# Patient Record
Sex: MALE | Race: WHITE | NOT HISPANIC OR LATINO | Employment: OTHER | URBAN - METROPOLITAN AREA
[De-identification: names, ages, dates, MRNs, and addresses within clinical notes are randomized per-mention and may not be internally consistent; named-entity substitution may affect disease eponyms.]

---

## 2023-06-04 ENCOUNTER — HOSPITAL ENCOUNTER (INPATIENT)
Facility: HOSPITAL | Age: 68
LOS: 4 days | Discharge: HOME | DRG: 208 | End: 2023-06-08
Attending: EMERGENCY MEDICINE | Admitting: INTERNAL MEDICINE
Payer: COMMERCIAL

## 2023-06-04 DIAGNOSIS — R06.02 SHORTNESS OF BREATH: ICD-10-CM

## 2023-06-04 DIAGNOSIS — R09.02 HYPOXIA: Primary | ICD-10-CM

## 2023-06-04 DIAGNOSIS — R07.9 CHEST PAIN: ICD-10-CM

## 2023-06-04 DIAGNOSIS — R06.00 DYSPNEA, UNSPECIFIED TYPE: ICD-10-CM

## 2023-06-04 DIAGNOSIS — J44.1 COPD EXACERBATION: ICD-10-CM

## 2023-06-04 DIAGNOSIS — R78.81 BACTEREMIA: ICD-10-CM

## 2023-06-04 PROBLEM — J44.9 COPD (CHRONIC OBSTRUCTIVE PULMONARY DISEASE): Status: ACTIVE | Noted: 2023-06-04

## 2023-06-04 PROBLEM — Z87.891 EX-SMOKER: Status: ACTIVE | Noted: 2023-06-04

## 2023-06-04 PROBLEM — C32.9 LARYNGEAL CANCER: Status: ACTIVE | Noted: 2023-06-04

## 2023-06-04 PROBLEM — J96.02 ACUTE HYPERCAPNIC RESPIRATORY FAILURE: Status: ACTIVE | Noted: 2023-06-04

## 2023-06-04 PROBLEM — K94.23 PEG TUBE MALFUNCTION: Status: ACTIVE | Noted: 2023-06-04

## 2023-06-04 PROBLEM — Z99.81 ON HOME OXYGEN THERAPY: Status: ACTIVE | Noted: 2023-06-04

## 2023-06-04 PROBLEM — J47.9 BRONCHIECTASIS: Status: ACTIVE | Noted: 2023-06-04

## 2023-06-04 LAB
ALBUMIN SERPL BCP-MCNC: 3.3 G/DL (ref 3.5–5.2)
ALLENS TEST: ABNORMAL
ALP SERPL-CCNC: 85 U/L (ref 55–135)
ALT SERPL W/O P-5'-P-CCNC: 46 U/L (ref 10–44)
ANION GAP SERPL CALC-SCNC: 11 MMOL/L (ref 8–16)
AST SERPL-CCNC: 25 U/L (ref 10–40)
BASOPHILS # BLD AUTO: 0.11 K/UL (ref 0–0.2)
BASOPHILS NFR BLD: 0.8 % (ref 0–1.9)
BILIRUB SERPL-MCNC: 0.4 MG/DL (ref 0.1–1)
BNP SERPL-MCNC: 52 PG/ML (ref 0–99)
BUN SERPL-MCNC: 24 MG/DL (ref 8–23)
CALCIUM SERPL-MCNC: 9.2 MG/DL (ref 8.7–10.5)
CHLORIDE SERPL-SCNC: 98 MMOL/L (ref 95–110)
CO2 SERPL-SCNC: 36 MMOL/L (ref 23–29)
CREAT SERPL-MCNC: 0.7 MG/DL (ref 0.5–1.4)
DELSYS: ABNORMAL
DIFFERENTIAL METHOD: ABNORMAL
EOSINOPHIL # BLD AUTO: 0.4 K/UL (ref 0–0.5)
EOSINOPHIL NFR BLD: 3 % (ref 0–8)
EP: 6
EP: 6
ERYTHROCYTE [DISTWIDTH] IN BLOOD BY AUTOMATED COUNT: 15.2 % (ref 11.5–14.5)
ERYTHROCYTE [SEDIMENTATION RATE] IN BLOOD BY WESTERGREN METHOD: 20 MM/H
ERYTHROCYTE [SEDIMENTATION RATE] IN BLOOD BY WESTERGREN METHOD: 26 MM/H
ERYTHROCYTE [SEDIMENTATION RATE] IN BLOOD BY WESTERGREN METHOD: 28 MM/H
EST. GFR  (NO RACE VARIABLE): >60 ML/MIN/1.73 M^2
FIO2: 35
FIO2: 40
FIO2: 40
FIO2: 50
FIO2: 50
GLUCOSE SERPL-MCNC: 132 MG/DL (ref 70–110)
GLUCOSE SERPL-MCNC: 137 MG/DL (ref 70–110)
GLUCOSE SERPL-MCNC: 153 MG/DL (ref 70–110)
GLUCOSE SERPL-MCNC: 162 MG/DL (ref 70–110)
GLUCOSE SERPL-MCNC: 221 MG/DL (ref 70–110)
GLUCOSE SERPL-MCNC: 261 MG/DL (ref 70–110)
HCO3 UR-SCNC: 30.7 MMOL/L (ref 24–28)
HCO3 UR-SCNC: 32.3 MMOL/L (ref 24–28)
HCO3 UR-SCNC: 33.6 MMOL/L (ref 24–28)
HCO3 UR-SCNC: 39.4 MMOL/L (ref 24–28)
HCO3 UR-SCNC: 39.8 MMOL/L (ref 24–28)
HCT VFR BLD AUTO: 45.9 % (ref 40–54)
HCT VFR BLD CALC: 35 %PCV (ref 36–54)
HCT VFR BLD CALC: 36 %PCV (ref 36–54)
HCT VFR BLD CALC: 38 %PCV (ref 36–54)
HCT VFR BLD CALC: 41 %PCV (ref 36–54)
HGB BLD-MCNC: 12.9 G/DL (ref 14–18)
IMM GRANULOCYTES # BLD AUTO: 0.07 K/UL (ref 0–0.04)
IMM GRANULOCYTES NFR BLD AUTO: 0.5 % (ref 0–0.5)
IP: 12
IP: 18
LACTATE SERPL-SCNC: 1.5 MMOL/L (ref 0.5–1.9)
LYMPHOCYTES # BLD AUTO: 0.5 K/UL (ref 1–4.8)
LYMPHOCYTES NFR BLD: 3.5 % (ref 18–48)
MAGNESIUM SERPL-MCNC: 2.4 MG/DL (ref 1.6–2.6)
MCH RBC QN AUTO: 26.8 PG (ref 27–31)
MCHC RBC AUTO-ENTMCNC: 28.1 G/DL (ref 32–36)
MCV RBC AUTO: 95 FL (ref 82–98)
MIN VOL: 1.5
MIN VOL: 10.8
MIN VOL: 7.64
MIN VOL: 8.6
MODE: ABNORMAL
MONOCYTES # BLD AUTO: 0.4 K/UL (ref 0.3–1)
MONOCYTES NFR BLD: 3.1 % (ref 4–15)
NEUTROPHILS # BLD AUTO: 12 K/UL (ref 1.8–7.7)
NEUTROPHILS NFR BLD: 89.1 % (ref 38–73)
NRBC BLD-RTO: 0 /100 WBC
PCO2 BLDA: 109.7 MMHG (ref 35–45)
PCO2 BLDA: 51.7 MMHG (ref 35–45)
PCO2 BLDA: 62.2 MMHG (ref 35–45)
PCO2 BLDA: 83.2 MMHG (ref 35–45)
PCO2 BLDA: 83.2 MMHG (ref 35–45)
PEEP: 5
PH SMN: 7.16 [PH] (ref 7.35–7.45)
PH SMN: 7.2 [PH] (ref 7.35–7.45)
PH SMN: 7.29 [PH] (ref 7.35–7.45)
PH SMN: 7.3 [PH] (ref 7.35–7.45)
PH SMN: 7.42 [PH] (ref 7.35–7.45)
PIP: 29
PIP: 35
PLATELET # BLD AUTO: 642 K/UL (ref 150–450)
PMV BLD AUTO: 10.5 FL (ref 9.2–12.9)
PO2 BLDA: 68 MMHG (ref 80–100)
PO2 BLDA: 84 MMHG (ref 80–100)
PO2 BLDA: 84 MMHG (ref 80–100)
PO2 BLDA: 95 MMHG (ref 80–100)
PO2 BLDA: 98 MMHG (ref 80–100)
POC BE: 11 MMOL/L
POC BE: 13 MMOL/L
POC BE: 4 MMOL/L
POC BE: 4 MMOL/L
POC BE: 9 MMOL/L
POC IONIZED CALCIUM: 1.15 MMOL/L (ref 1.06–1.42)
POC IONIZED CALCIUM: 1.17 MMOL/L (ref 1.06–1.42)
POC IONIZED CALCIUM: 1.17 MMOL/L (ref 1.06–1.42)
POC IONIZED CALCIUM: 1.26 MMOL/L (ref 1.06–1.42)
POC SATURATED O2: 90 % (ref 95–100)
POC SATURATED O2: 94 % (ref 95–100)
POC SATURATED O2: 95 % (ref 95–100)
POC SATURATED O2: 95 % (ref 95–100)
POC SATURATED O2: 96 % (ref 95–100)
POC TCO2: 33 MMOL/L (ref 23–27)
POC TCO2: 35 MMOL/L (ref 23–27)
POC TCO2: 35 MMOL/L (ref 23–27)
POC TCO2: 42 MMOL/L (ref 23–27)
POC TCO2: 43 MMOL/L (ref 23–27)
POTASSIUM BLD-SCNC: 4.4 MMOL/L (ref 3.5–5.1)
POTASSIUM BLD-SCNC: 4.5 MMOL/L (ref 3.5–5.1)
POTASSIUM BLD-SCNC: 4.7 MMOL/L (ref 3.5–5.1)
POTASSIUM BLD-SCNC: 5.3 MMOL/L (ref 3.5–5.1)
POTASSIUM SERPL-SCNC: 4.6 MMOL/L (ref 3.5–5.1)
PROT SERPL-MCNC: 8.2 G/DL (ref 6–8.4)
RBC # BLD AUTO: 4.81 M/UL (ref 4.6–6.2)
SAMPLE: ABNORMAL
SITE: ABNORMAL
SODIUM BLD-SCNC: 144 MMOL/L (ref 136–145)
SODIUM BLD-SCNC: 144 MMOL/L (ref 136–145)
SODIUM BLD-SCNC: 145 MMOL/L (ref 136–145)
SODIUM BLD-SCNC: 147 MMOL/L (ref 136–145)
SODIUM SERPL-SCNC: 145 MMOL/L (ref 136–145)
SP02: 100
SP02: 97
SP02: 98
SP02: 99
SPONT RATE: 28
SPONT RATE: 29
TROPONIN I SERPL HS-MCNC: 9.5 PG/ML (ref 0–14.9)
VT: 370
WBC # BLD AUTO: 13.49 K/UL (ref 3.9–12.7)

## 2023-06-04 PROCEDURE — 25000003 PHARM REV CODE 250: Performed by: INTERNAL MEDICINE

## 2023-06-04 PROCEDURE — 36620 INSERTION CATHETER ARTERY: CPT

## 2023-06-04 PROCEDURE — 25000242 PHARM REV CODE 250 ALT 637 W/ HCPCS: Performed by: EMERGENCY MEDICINE

## 2023-06-04 PROCEDURE — 99900026 HC AIRWAY MAINTENANCE (STAT)

## 2023-06-04 PROCEDURE — 87077 CULTURE AEROBIC IDENTIFY: CPT | Performed by: EMERGENCY MEDICINE

## 2023-06-04 PROCEDURE — 99291 CRITICAL CARE FIRST HOUR: CPT | Mod: ,,, | Performed by: INTERNAL MEDICINE

## 2023-06-04 PROCEDURE — 93005 ELECTROCARDIOGRAM TRACING: CPT | Performed by: INTERNAL MEDICINE

## 2023-06-04 PROCEDURE — 80053 COMPREHEN METABOLIC PANEL: CPT | Performed by: EMERGENCY MEDICINE

## 2023-06-04 PROCEDURE — 25000003 PHARM REV CODE 250: Performed by: EMERGENCY MEDICINE

## 2023-06-04 PROCEDURE — 25000242 PHARM REV CODE 250 ALT 637 W/ HCPCS: Performed by: INTERNAL MEDICINE

## 2023-06-04 PROCEDURE — 87186 SC STD MICRODIL/AGAR DIL: CPT | Performed by: EMERGENCY MEDICINE

## 2023-06-04 PROCEDURE — 51702 INSERT TEMP BLADDER CATH: CPT

## 2023-06-04 PROCEDURE — 99900031 HC PATIENT EDUCATION (STAT)

## 2023-06-04 PROCEDURE — 87070 CULTURE OTHR SPECIMN AEROBIC: CPT | Performed by: EMERGENCY MEDICINE

## 2023-06-04 PROCEDURE — 82330 ASSAY OF CALCIUM: CPT

## 2023-06-04 PROCEDURE — 83735 ASSAY OF MAGNESIUM: CPT | Performed by: EMERGENCY MEDICINE

## 2023-06-04 PROCEDURE — 63600175 PHARM REV CODE 636 W HCPCS: Performed by: EMERGENCY MEDICINE

## 2023-06-04 PROCEDURE — 87040 BLOOD CULTURE FOR BACTERIA: CPT | Mod: 59 | Performed by: EMERGENCY MEDICINE

## 2023-06-04 PROCEDURE — 94002 VENT MGMT INPAT INIT DAY: CPT

## 2023-06-04 PROCEDURE — 63600175 PHARM REV CODE 636 W HCPCS: Performed by: INTERNAL MEDICINE

## 2023-06-04 PROCEDURE — 82803 BLOOD GASES ANY COMBINATION: CPT

## 2023-06-04 PROCEDURE — 94660 CPAP INITIATION&MGMT: CPT

## 2023-06-04 PROCEDURE — 84484 ASSAY OF TROPONIN QUANT: CPT | Performed by: EMERGENCY MEDICINE

## 2023-06-04 PROCEDURE — 85014 HEMATOCRIT: CPT

## 2023-06-04 PROCEDURE — 84132 ASSAY OF SERUM POTASSIUM: CPT

## 2023-06-04 PROCEDURE — 83605 ASSAY OF LACTIC ACID: CPT | Performed by: EMERGENCY MEDICINE

## 2023-06-04 PROCEDURE — 99900035 HC TECH TIME PER 15 MIN (STAT)

## 2023-06-04 PROCEDURE — 37799 UNLISTED PX VASCULAR SURGERY: CPT

## 2023-06-04 PROCEDURE — 83880 ASSAY OF NATRIURETIC PEPTIDE: CPT | Performed by: EMERGENCY MEDICINE

## 2023-06-04 PROCEDURE — 25500020 PHARM REV CODE 255: Performed by: EMERGENCY MEDICINE

## 2023-06-04 PROCEDURE — 94761 N-INVAS EAR/PLS OXIMETRY MLT: CPT

## 2023-06-04 PROCEDURE — 99291 CRITICAL CARE FIRST HOUR: CPT

## 2023-06-04 PROCEDURE — 93010 EKG 12-LEAD: ICD-10-PCS | Mod: ,,, | Performed by: INTERNAL MEDICINE

## 2023-06-04 PROCEDURE — 94640 AIRWAY INHALATION TREATMENT: CPT

## 2023-06-04 PROCEDURE — 94799 UNLISTED PULMONARY SVC/PX: CPT

## 2023-06-04 PROCEDURE — 93010 ELECTROCARDIOGRAM REPORT: CPT | Mod: ,,, | Performed by: INTERNAL MEDICINE

## 2023-06-04 PROCEDURE — 87154 CUL TYP ID BLD PTHGN 6+ TRGT: CPT | Performed by: EMERGENCY MEDICINE

## 2023-06-04 PROCEDURE — 20000000 HC ICU ROOM

## 2023-06-04 PROCEDURE — 36600 WITHDRAWAL OF ARTERIAL BLOOD: CPT

## 2023-06-04 PROCEDURE — 84295 ASSAY OF SERUM SODIUM: CPT

## 2023-06-04 PROCEDURE — 87205 SMEAR GRAM STAIN: CPT | Performed by: EMERGENCY MEDICINE

## 2023-06-04 PROCEDURE — 82962 GLUCOSE BLOOD TEST: CPT

## 2023-06-04 PROCEDURE — 99291 PR CRITICAL CARE, E/M 30-74 MINUTES: ICD-10-PCS | Mod: ,,, | Performed by: INTERNAL MEDICINE

## 2023-06-04 PROCEDURE — 85025 COMPLETE CBC W/AUTO DIFF WBC: CPT | Performed by: EMERGENCY MEDICINE

## 2023-06-04 PROCEDURE — 63600175 PHARM REV CODE 636 W HCPCS

## 2023-06-04 PROCEDURE — 27000221 HC OXYGEN, UP TO 24 HOURS

## 2023-06-04 PROCEDURE — 87147 CULTURE TYPE IMMUNOLOGIC: CPT | Mod: 59 | Performed by: EMERGENCY MEDICINE

## 2023-06-04 RX ORDER — IPRATROPIUM BROMIDE AND ALBUTEROL SULFATE 2.5; .5 MG/3ML; MG/3ML
3 SOLUTION RESPIRATORY (INHALATION) ONCE
Status: COMPLETED | OUTPATIENT
Start: 2023-06-04 | End: 2023-06-04

## 2023-06-04 RX ORDER — FAMOTIDINE 20 MG/50ML
20 INJECTION, SOLUTION INTRAVENOUS 2 TIMES DAILY
Status: DISCONTINUED | OUTPATIENT
Start: 2023-06-04 | End: 2023-06-04

## 2023-06-04 RX ORDER — LIDOCAINE HYDROCHLORIDE 10 MG/ML
5 INJECTION, SOLUTION EPIDURAL; INFILTRATION; INTRACAUDAL; PERINEURAL
Status: COMPLETED | OUTPATIENT
Start: 2023-06-04 | End: 2023-06-04

## 2023-06-04 RX ORDER — ENOXAPARIN SODIUM 100 MG/ML
40 INJECTION SUBCUTANEOUS EVERY 24 HOURS
Status: DISCONTINUED | OUTPATIENT
Start: 2023-06-04 | End: 2023-06-08 | Stop reason: HOSPADM

## 2023-06-04 RX ORDER — FAMOTIDINE 10 MG/ML
20 INJECTION INTRAVENOUS 2 TIMES DAILY
Status: DISCONTINUED | OUTPATIENT
Start: 2023-06-04 | End: 2023-06-05

## 2023-06-04 RX ORDER — AMLODIPINE BESYLATE 5 MG/1
5 TABLET ORAL DAILY
COMMUNITY
Start: 2023-05-26

## 2023-06-04 RX ORDER — GLUCAGON 1 MG
1 KIT INJECTION
Status: DISCONTINUED | OUTPATIENT
Start: 2023-06-04 | End: 2023-06-08 | Stop reason: HOSPADM

## 2023-06-04 RX ORDER — ALBUTEROL SULFATE 0.83 MG/ML
2.5 SOLUTION RESPIRATORY (INHALATION) 2 TIMES DAILY
COMMUNITY
Start: 2023-05-19

## 2023-06-04 RX ORDER — HYDROMORPHONE HYDROCHLORIDE 1 MG/ML
1 INJECTION, SOLUTION INTRAMUSCULAR; INTRAVENOUS; SUBCUTANEOUS
Status: COMPLETED | OUTPATIENT
Start: 2023-06-04 | End: 2023-06-04

## 2023-06-04 RX ORDER — ROFLUMILAST 500 UG/1
500 TABLET ORAL DAILY
COMMUNITY
Start: 2023-05-17

## 2023-06-04 RX ORDER — IBUPROFEN 200 MG
16 TABLET ORAL
Status: DISCONTINUED | OUTPATIENT
Start: 2023-06-04 | End: 2023-06-08 | Stop reason: HOSPADM

## 2023-06-04 RX ORDER — BUDESONIDE, GLYCOPYRROLATE, AND FORMOTEROL FUMARATE 160; 9; 4.8 UG/1; UG/1; UG/1
2 AEROSOL, METERED RESPIRATORY (INHALATION) 2 TIMES DAILY
COMMUNITY
Start: 2023-05-21

## 2023-06-04 RX ORDER — FAMOTIDINE 20 MG/1
20 TABLET, FILM COATED ORAL 2 TIMES DAILY
COMMUNITY
Start: 2023-03-26

## 2023-06-04 RX ORDER — FENTANYL CITRATE-0.9 % NACL/PF 10 MCG/ML
0-200 PLASTIC BAG, INJECTION (ML) INTRAVENOUS CONTINUOUS
Status: DISCONTINUED | OUTPATIENT
Start: 2023-06-04 | End: 2023-06-05

## 2023-06-04 RX ORDER — ETOMIDATE 2 MG/ML
INJECTION INTRAVENOUS CODE/TRAUMA/SEDATION MEDICATION
Status: DISCONTINUED | OUTPATIENT
Start: 2023-06-04 | End: 2023-06-08 | Stop reason: HOSPADM

## 2023-06-04 RX ORDER — LEVOTHYROXINE SODIUM 125 UG/1
125 TABLET ORAL DAILY
COMMUNITY
Start: 2023-05-12

## 2023-06-04 RX ORDER — AMITRIPTYLINE HYDROCHLORIDE 50 MG/1
50 TABLET, FILM COATED ORAL NIGHTLY
COMMUNITY
Start: 2023-05-12

## 2023-06-04 RX ORDER — FLUTICASONE PROPIONATE 50 MCG
1 SPRAY, SUSPENSION (ML) NASAL DAILY
COMMUNITY
Start: 2023-04-13

## 2023-06-04 RX ORDER — SODIUM CHLORIDE 0.9 % (FLUSH) 0.9 %
10 SYRINGE (ML) INJECTION EVERY 12 HOURS PRN
Status: DISCONTINUED | OUTPATIENT
Start: 2023-06-04 | End: 2023-06-08 | Stop reason: HOSPADM

## 2023-06-04 RX ORDER — SUCCINYLCHOLINE CHLORIDE 20 MG/ML
120 INJECTION INTRAMUSCULAR; INTRAVENOUS
Status: COMPLETED | OUTPATIENT
Start: 2023-06-04 | End: 2023-06-04

## 2023-06-04 RX ORDER — TOBRAMYCIN INHALATION 300 MG/4ML
300 SOLUTION RESPIRATORY (INHALATION) 2 TIMES DAILY
COMMUNITY
Start: 2023-01-10

## 2023-06-04 RX ORDER — MIDAZOLAM HYDROCHLORIDE 1 MG/ML
INJECTION INTRAMUSCULAR; INTRAVENOUS
Status: COMPLETED
Start: 2023-06-04 | End: 2023-06-04

## 2023-06-04 RX ORDER — PROPOFOL 10 MG/ML
0-50 INJECTION, EMULSION INTRAVENOUS CONTINUOUS
Status: DISCONTINUED | OUTPATIENT
Start: 2023-06-04 | End: 2023-06-05

## 2023-06-04 RX ORDER — IPRATROPIUM BROMIDE AND ALBUTEROL SULFATE 2.5; .5 MG/3ML; MG/3ML
SOLUTION RESPIRATORY (INHALATION)
Status: DISPENSED
Start: 2023-06-04 | End: 2023-06-04

## 2023-06-04 RX ORDER — IPRATROPIUM BROMIDE AND ALBUTEROL SULFATE 2.5; .5 MG/3ML; MG/3ML
3 SOLUTION RESPIRATORY (INHALATION) EVERY 4 HOURS
Status: DISCONTINUED | OUTPATIENT
Start: 2023-06-04 | End: 2023-06-08 | Stop reason: HOSPADM

## 2023-06-04 RX ORDER — SODIUM CHLORIDE FOR INHALATION 3 %
4 VIAL, NEBULIZER (ML) INHALATION 2 TIMES DAILY
COMMUNITY
Start: 2022-11-08

## 2023-06-04 RX ORDER — FERROUS SULFATE TAB 325 MG (65 MG ELEMENTAL FE) 325 (65 FE) MG
325 TAB ORAL EVERY OTHER DAY
COMMUNITY
Start: 2023-05-17

## 2023-06-04 RX ORDER — NOREPINEPHRINE BITARTRATE/D5W 4MG/250ML
PLASTIC BAG, INJECTION (ML) INTRAVENOUS
Status: DISPENSED
Start: 2023-06-04 | End: 2023-06-04

## 2023-06-04 RX ORDER — IBUPROFEN 200 MG
24 TABLET ORAL
Status: DISCONTINUED | OUTPATIENT
Start: 2023-06-04 | End: 2023-06-08 | Stop reason: HOSPADM

## 2023-06-04 RX ORDER — CHLORHEXIDINE GLUCONATE ORAL RINSE 1.2 MG/ML
15 SOLUTION DENTAL 2 TIMES DAILY
Status: DISCONTINUED | OUTPATIENT
Start: 2023-06-04 | End: 2023-06-08 | Stop reason: HOSPADM

## 2023-06-04 RX ORDER — OXYCODONE AND ACETAMINOPHEN 10; 325 MG/1; MG/1
1 TABLET ORAL EVERY 8 HOURS PRN
COMMUNITY
Start: 2023-05-02

## 2023-06-04 RX ORDER — ALBUTEROL SULFATE 90 UG/1
1-2 AEROSOL, METERED RESPIRATORY (INHALATION) EVERY 6 HOURS PRN
COMMUNITY
Start: 2022-11-08

## 2023-06-04 RX ORDER — NOREPINEPHRINE BITARTRATE/D5W 4MG/250ML
0-3 PLASTIC BAG, INJECTION (ML) INTRAVENOUS CONTINUOUS
Status: DISCONTINUED | OUTPATIENT
Start: 2023-06-04 | End: 2023-06-05

## 2023-06-04 RX ORDER — PREDNISONE 20 MG/1
20 TABLET ORAL DAILY
Status: ON HOLD | COMMUNITY
Start: 2023-04-01 | End: 2023-06-08 | Stop reason: HOSPADM

## 2023-06-04 RX ORDER — NALOXONE HCL 0.4 MG/ML
0.02 VIAL (ML) INJECTION
Status: DISCONTINUED | OUTPATIENT
Start: 2023-06-04 | End: 2023-06-08 | Stop reason: HOSPADM

## 2023-06-04 RX ORDER — MIDAZOLAM HYDROCHLORIDE 1 MG/ML
5 INJECTION INTRAMUSCULAR; INTRAVENOUS
Status: COMPLETED | OUTPATIENT
Start: 2023-06-04 | End: 2023-06-04

## 2023-06-04 RX ADMIN — MIDAZOLAM HYDROCHLORIDE 5 MG: 1 INJECTION, SOLUTION INTRAMUSCULAR; INTRAVENOUS at 09:06

## 2023-06-04 RX ADMIN — PROPOFOL 5 MCG/KG/MIN: 10 INJECTION, EMULSION INTRAVENOUS at 09:06

## 2023-06-04 RX ADMIN — IPRATROPIUM BROMIDE AND ALBUTEROL SULFATE 3 ML: .5; 3 SOLUTION RESPIRATORY (INHALATION) at 09:06

## 2023-06-04 RX ADMIN — AZITHROMYCIN MONOHYDRATE 500 MG: 500 INJECTION, POWDER, LYOPHILIZED, FOR SOLUTION INTRAVENOUS at 07:06

## 2023-06-04 RX ADMIN — PROPOFOL 50 MCG/KG/MIN: 10 INJECTION, EMULSION INTRAVENOUS at 02:06

## 2023-06-04 RX ADMIN — NOREPINEPHRINE BITARTRATE 0.05 MCG/KG/MIN: 4 INJECTION, SOLUTION INTRAVENOUS at 09:06

## 2023-06-04 RX ADMIN — IPRATROPIUM BROMIDE AND ALBUTEROL SULFATE 3 ML: 2.5; .5 SOLUTION RESPIRATORY (INHALATION) at 07:06

## 2023-06-04 RX ADMIN — METHYLPREDNISOLONE SODIUM SUCCINATE 80 MG: 40 INJECTION, POWDER, FOR SOLUTION INTRAMUSCULAR; INTRAVENOUS at 09:06

## 2023-06-04 RX ADMIN — SODIUM CHLORIDE 1000 ML: 0.9 INJECTION, SOLUTION INTRAVENOUS at 07:06

## 2023-06-04 RX ADMIN — FAMOTIDINE 20 MG: 10 INJECTION, SOLUTION INTRAVENOUS at 09:06

## 2023-06-04 RX ADMIN — ENOXAPARIN SODIUM 40 MG: 100 INJECTION SUBCUTANEOUS at 05:06

## 2023-06-04 RX ADMIN — LIDOCAINE HYDROCHLORIDE 50 MG: 10 SOLUTION INTRAVENOUS at 09:06

## 2023-06-04 RX ADMIN — METHYLPREDNISOLONE SODIUM SUCCINATE 80 MG: 40 INJECTION, POWDER, FOR SOLUTION INTRAMUSCULAR; INTRAVENOUS at 02:06

## 2023-06-04 RX ADMIN — ETOMIDATE 10 MG: 2 INJECTION, SOLUTION INTRAVENOUS at 08:06

## 2023-06-04 RX ADMIN — Medication 25 MCG/HR: at 11:06

## 2023-06-04 RX ADMIN — IOHEXOL 100 ML: 350 INJECTION, SOLUTION INTRAVENOUS at 04:06

## 2023-06-04 RX ADMIN — PROPOFOL 50 MCG/KG/MIN: 10 INJECTION, EMULSION INTRAVENOUS at 08:06

## 2023-06-04 RX ADMIN — METHYLPREDNISOLONE SODIUM SUCCINATE 80 MG: 40 INJECTION, POWDER, FOR SOLUTION INTRAMUSCULAR; INTRAVENOUS at 05:06

## 2023-06-04 RX ADMIN — MIDAZOLAM HYDROCHLORIDE 5 MG: 1 INJECTION INTRAMUSCULAR; INTRAVENOUS at 09:06

## 2023-06-04 RX ADMIN — HYDROMORPHONE HYDROCHLORIDE 1 MG: 0.5 INJECTION, SOLUTION INTRAMUSCULAR; INTRAVENOUS; SUBCUTANEOUS at 05:06

## 2023-06-04 RX ADMIN — IPRATROPIUM BROMIDE AND ALBUTEROL SULFATE 3 ML: 2.5; .5 SOLUTION RESPIRATORY (INHALATION) at 11:06

## 2023-06-04 RX ADMIN — Medication 120 MG: at 08:06

## 2023-06-04 RX ADMIN — SODIUM CHLORIDE 1000 ML: 0.9 INJECTION, SOLUTION INTRAVENOUS at 05:06

## 2023-06-04 RX ADMIN — CHLORHEXIDINE GLUCONATE 15 ML: 1.2 RINSE ORAL at 09:06

## 2023-06-04 NOTE — PLAN OF CARE
[facility]  Initial Discharge Assessment       Primary Care Provider: [facility]    Admission Diagnosis: Shortness of breath [R06.02]  Acute respiratory failure [J96.00]    Admission Date: 6/4/2023  Expected Discharge Date:     Transition of Care Barriers: None    Payor: HEALTHY BLUE MEDICARE ADVANTAGE / Plan: HEALTHY BLUE DUAL ADVANTAGE / Product Type: Medicare Advantage /     Extended Emergency Contact Information  Primary Emergency Contact: Linh Moy  Address: [id number] [place] [address]  Home Phone: [phone]  Mobile Phone: [phone]  Relation: Spouse   needed? No    Discharge Plan A: Home with family  Discharge Plan B: Home with family      [facility] [id number] - [facility] [address]  [address] & [address] [facility]  [address] [address]  Phone: [phone] Fax: [phone]      Initial Assessment (most recent)       Adult Discharge Assessment - 06/04/23 1240          Discharge Assessment    Assessment Type Discharge Planning Assessment     Confirmed/corrected address, phone number and insurance Yes     Confirmed Demographics Correct on Facesheet     Source of Information family;health record     Reason For Admission SOB     People in Home significant other     Facility Arrived From: home     Do you expect to return to your current living situation? Yes     Do you have help at home or someone to help you manage your care at home? Yes     Who are your caregiver(s) and their phone number(s)? Linh Moy (Spouse)   [phone] (Mobile)     Prior to hospitilization cognitive status: Unable to Assess     Current cognitive status: Unable to Assess     Equipment Currently Used at Home BIPAP;oxygen;feeding device;nutrition supplies     Readmission within 30 days? No     Patient currently being followed by outpatient case management? No     Do you take prescription medications? Yes     Do  you have prescription coverage? Yes     Coverage HEALTHY BLUE MEDICARE ADVANTAGE - HEALTHY BLUE DUAL ADVANTAGE     Do you have any problems affording any of your prescribed medications? No     Is the patient taking medications as prescribed? yes     Who is going to help you get home at discharge? Linh Moy (Spouse)   [phone] (Mobile)     How do you get to doctors appointments? family or friend will provide     Are you on dialysis? No     Do you take coumadin? No     Discharge Plan A Home with family     Discharge Plan B Home with family     DME Needed Upon Discharge  none     Discharge Plan discussed with: Spouse/sig other     Name(s) and Number(s) Linh Moy (Spouse)   [phone] (Mobile)     Transition of Care Barriers None

## 2023-06-04 NOTE — SUBJECTIVE & OBJECTIVE
No past medical history on file.    No past surgical history on file.    Review of patient's allergies indicates:  No Known Allergies    No current facility-administered medications on file prior to encounter.     No current outpatient medications on file prior to encounter.     Family History    None       Tobacco Use    Smoking status: Former     Types: Cigarettes    Smokeless tobacco: Not on file   Substance and Sexual Activity    Alcohol use: Not Currently    Drug use: Not Currently    Sexual activity: Not Currently     Review of Systems   Constitutional:  Negative for activity change, appetite change and diaphoresis.   HENT:  Negative for congestion, facial swelling and sinus pressure.    Respiratory:  Positive for shortness of breath. Negative for wheezing.    Cardiovascular:  Negative for chest pain and palpitations.   Gastrointestinal:  Negative for abdominal distention and abdominal pain.   Genitourinary:  Negative for dysuria.   Skin:  Negative for color change and pallor.   Neurological:  Negative for dizziness, facial asymmetry, speech difficulty and headaches.   Psychiatric/Behavioral:  Negative for agitation and confusion.    Objective:     Vital Signs (Most Recent):  Temp: 99.6 °F (37.6 °C) (06/04/23 0228)  Pulse: 103 (06/04/23 0400)  Resp: (!) 28 (06/04/23 0400)  BP: (!) 96/52 (06/04/23 0400)  SpO2: 97 % (06/04/23 0432) Vital Signs (24h Range):  Temp:  [99.6 °F (37.6 °C)] 99.6 °F (37.6 °C)  Pulse:  [103-129] 103  Resp:  [22-31] 28  SpO2:  [68 %-100 %] 97 %  BP: ()/(52-90) 96/52     Weight: 59 kg (130 lb)  There is no height or weight on file to calculate BMI.     Physical Exam  Constitutional:       General: He is not in acute distress.     Appearance: He is well-developed.   HENT:      Head: Normocephalic.      Nose: Nose normal.   Eyes:      Pupils: Pupils are equal, round, and reactive to light.   Cardiovascular:      Rate and Rhythm: Normal rate and regular rhythm.   Pulmonary:      Effort:  Pulmonary effort is normal. No respiratory distress.      Comments: Decreased BS  Abdominal:      General: Abdomen is flat. There is no distension.      Tenderness: There is no abdominal tenderness.   Musculoskeletal:         General: Normal range of motion.      Cervical back: Neck supple.   Skin:     Findings: No rash.   Neurological:      Mental Status: He is alert and oriented to person, place, and time.   Psychiatric:         Mood and Affect: Mood normal.            CRANIAL NERVES     CN III, IV, VI   Pupils are equal, round, and reactive to light.     Significant Labs: All pertinent labs within the past 24 hours have been reviewed.  CBC:   Recent Labs   Lab 06/04/23 0226 06/04/23 0250   WBC 13.49*  --    HGB 12.9*  --    HCT 45.9 41   *  --      CMP:   Recent Labs   Lab 06/04/23 0226      K 4.6   CL 98   CO2 36*   *   BUN 24*   CREATININE 0.7   CALCIUM 9.2   PROT 8.2   ALBUMIN 3.3*   BILITOT 0.4   ALKPHOS 85   AST 25   ALT 46*   ANIONGAP 11     Cardiac Markers:   Recent Labs   Lab 06/04/23 0226   BNP 52       Significant Imaging: I have reviewed all pertinent imaging results/findings within the past 24 hours.

## 2023-06-04 NOTE — H&P
[facility] - Emergency Dept  Hospital Medicine  History & Physical    Patient Name: Reji Romano  MRN: [id number]  Patient Class: IP- Inpatient  Admission Date: 6/4/2023  Attending Physician: Constantine Alvarez MD   Primary Care Provider: [facility]         Patient information was obtained from patient and ER records.     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  He was diagnosed with tonsillar cancer 4 years ago and completed the treatment and following up with the Oncology and currently in remission.  He is ex-smoker about 4 years and stopped after he got diagnosis of cancer.  This evening he started having shortness of breath and wheezing and brought to the emergency room.  ABG was done in the emergency room with hypercapnia and started BiPAP.  Later CT chest was done no pulmonary embolism and no infiltrate and chronic changes.  There is no  record in this hospital and patient always follow-up with [facility].  On examination patient is on   BiPAP and history mostly from wife.  Patient needed PEG tube feeding.  Lung exam with limited air entry.  ABG with a respiratory acidosis and will continue BiPAP.      No past medical history on file.    No past surgical history on file.    Review of patient's allergies indicates:  No Known Allergies    No current facility-administered medications on file prior to encounter.     No current outpatient medications on file prior to encounter.     Family History    None       Tobacco Use    Smoking status: Former     Types: Cigarettes    Smokeless tobacco: Not on file   Substance and Sexual Activity    Alcohol use:  Not Currently    Drug use: Not Currently    Sexual activity: Not Currently     Review of Systems   Constitutional:  Negative for activity change, appetite change and diaphoresis.   HENT:  Negative for congestion, facial swelling and sinus pressure.    Respiratory:  Positive for shortness of breath. Negative for wheezing.    Cardiovascular:  Negative for chest pain and palpitations.   Gastrointestinal:  Negative for abdominal distention and abdominal pain.   Genitourinary:  Negative for dysuria.   Skin:  Negative for color change and pallor.   Neurological:  Negative for dizziness, facial asymmetry, speech difficulty and headaches.   Psychiatric/Behavioral:  Negative for agitation and confusion.    Objective:     Vital Signs (Most Recent):  Temp: 99.6 °F (37.6 °C) (06/04/23 0228)  Pulse: 103 (06/04/23 0400)  Resp: (!) 28 (06/04/23 0400)  BP: (!) 96/52 (06/04/23 0400)  SpO2: 97 % (06/04/23 0432) Vital Signs (24h Range):  Temp:  [99.6 °F (37.6 °C)] 99.6 °F (37.6 °C)  Pulse:  [103-129] 103  Resp:  [22-31] 28  SpO2:  [68 %-100 %] 97 %  BP: ()/(52-90) 96/52     Weight: 59 kg (130 lb)  There is no height or weight on file to calculate BMI.     Physical Exam  Constitutional:       General: He is not in acute distress.     Appearance: He is well-developed.   HENT:      Head: Normocephalic.      Nose: Nose normal.   Eyes:      Pupils: Pupils are equal, round, and reactive to light.   Cardiovascular:      Rate and Rhythm: Normal rate and regular rhythm.   Pulmonary:      Effort: Pulmonary effort is normal. No respiratory distress.      Comments: Decreased BS  Abdominal:      General: Abdomen is flat. There is no distension.      Tenderness: There is no abdominal tenderness.   Musculoskeletal:         General: Normal range of motion.      Cervical back: Neck supple.   Skin:     Findings: No rash.   Neurological:      Mental Status: He is alert and oriented to person, place, and time.   Psychiatric:         Mood and  Affect: Mood normal.            CRANIAL NERVES     CN III, IV, VI   Pupils are equal, round, and reactive to light.     Significant Labs: All pertinent labs within the past 24 hours have been reviewed.  CBC:   Recent Labs   Lab 06/04/23 0226 06/04/23 0250   WBC 13.49*  --    HGB 12.9*  --    HCT 45.9 41   *  --      CMP:   Recent Labs   Lab 06/04/23 0226      K 4.6   CL 98   CO2 36*   *   BUN 24*   CREATININE 0.7   CALCIUM 9.2   PROT 8.2   ALBUMIN 3.3*   BILITOT 0.4   ALKPHOS 85   AST 25   ALT 46*   ANIONGAP 11     Cardiac Markers:   Recent Labs   Lab 06/04/23 0226   BNP 52       Significant Imaging: I have reviewed all pertinent imaging results/findings within the past 24 hours.    CTA Chest Non-Coronary (PE Studies)  Status: Final result     [employer] Results Release    [employer] Status: Pending  Results Release     PACS Images for [employer] [place] Viewer     Show images for CTA Chest Non-Coronary (PE Studies)  CTA Chest Non-Coronary (PE Studies)  Order: [id number]   Status: Final result      Visible to patient: No (inaccessible in Patient Portal)      Next appt: None      0 Result Notes  Details    Reading Physician Reading Date Result Priority   Azucena Hernández MD  [phone] 6/4/2023      Narrative & Impression  EXAM DESCRIPTION:  CTA CHEST NON CORONARY (PE STUDIES) 6/4/2023 4:22 AM CDT  [employer]: CT CHEST ANGIOGRAPHY WITH IV CONTRAST     CLINICAL HISTORY:  68 years Male, Pulmonary embolism (PE) suspected, unknown D-dimer     COMPARISON:  None     TECHNIQUE: Contiguous axial images through the chest during IV contrast administration using pulmonary artery embolism protocol. Multiplanar postprocessing 3D MIP reformatted images were obtained.     The protocol utilizes one or more of the following dose reduction techniques:  automated exposure control, adjustment of mA and/or kV according to patient size, and/or use of iterative reconstruction technique.     FINDINGS: Quality of examination is  degraded secondary to patient respiratory motion artifact.     PULMONARY ARTERIES: There is no evidence of pulmonary embolism.     LUNGS: Emphysematous changes are noted. No mass or consolidation is identified in the lungs. Nonspecific peribronchial wall thickening is appreciated along with suggestion of multiple small scattered areas of distal mucous plugging in bilateral lung bases.     PLEURA: There is no pleural effusion or pneumothorax.     CARDIOVASCULAR: There is no pericardial effusion. Scattered coronary artery calcifications are seen.     NODES: No significantly enlarged mediastinal or hilar lymph nodes are identified.     BONES: No evidence of acute osseous abnormality. Degenerative changes of the thoracic spine are visualized.     IMPRESSION:  1. NO EVIDENCE OF PULMONARY EMBOLISM WITHIN THE LIMITATIONS OF RESPIRATORY MOTION ARTIFACT.  2. NO EVIDENCE OF ACUTE INFILTRATE.  3. PLEASE REFER TO BODY OF DETAILED REPORT ABOVE FOR ADDITIONAL FINDINGS.           Assessment/Plan:     Active Hospital Problems    Diagnosis    Laryngeal cancer s/p surgery and chemo    COPD exacerbation    PEG tube     Acute hypercapnic respiratory failure    On home oxygen therapy    Ex-smoker    Bronchiectasis       PLAN   Continue BIPAP   Progressive care admit  Repeat ABG in 2 hrs   Azithromycin    duoneb q4  Solu medrol 80 mg q8  Consult nutrition for PEG tube   Consult pulmonary if needed   Blood culture taken   Med reconciliation is not done yet. pls do when ready       VTE Risk Mitigation (From admission, onward)         Ordered     IP VTE HIGH RISK PATIENT  Once         06/04/23 0525     Place sequential compression device  Until discontinued         06/04/23 0525                           Constantine Alvarez MD  Department of Hospital Medicine  Former[facility] - Emergency Dept

## 2023-06-04 NOTE — CARE UPDATE
06/04/23 0700   Patient Assessment/Suction   Level of Consciousness (AVPU) alert   Respiratory Effort Unlabored   Expansion/Accessory Muscles/Retractions abdominal muscle use   All Lung Fields Breath Sounds coarse;crackles   Rhythm/Pattern, Respiratory assisted mechanically   PRE-TX-O2   Device (Oxygen Therapy) BIPAP   $ Is the patient on Low Flow Oxygen? Yes   Oxygen Concentration (%) 50   SpO2 99 %   Pulse Oximetry Type Continuous   Pulse 93   Resp (!) 24   Aerosol Therapy   $ Aerosol Therapy Charges Aerosol Treatment   Daily Review of Necessity (SVN) completed   Respiratory Treatment Status (SVN) given   Treatment Route (SVN) in-line   Patient Position (SVN) HOB elevated   Post Treatment Assessment (SVN) breath sounds unchanged;vital signs unchanged   Signs of Intolerance (SVN) none   Preset CPAP/BiPAP Settings   Mode Of Delivery BiPAP S/T   $ CPAP/BiPAP Daily Charge BiPAP/CPAP Daily   $ Initial CPAP/BiPAP Setup? No   $ Is patient using? Yes   Size of Mask Small/Medium   Sized Appropriately? Yes   Equipment Type V60   Airway Device Type medium full face mask   Ipap 18  (increased from 12 for low vt 150s)   EPAP (cm H2O) 6   Pressure Support (cm H2O) 12   Set Rate (Breaths/Min) 26   ITime (sec) 0.85  (changed from 1 to help with vt)   Rise Time (sec) 3   Patient CPAP/BiPAP Settings   RR Total (Breaths/Min) 28   Tidal Volume (mL) 310   VE Minute Ventilation (L/min) 7.8 L/min   Peak Inspiratory Pressure (cm H2O) 19   TiTOT (%) 33   Total Leak (L/Min) 29   Patient Trigger - ST Mode Only (%) 26   CPAP/BiPAP Alarms   High Pressure (cm H2O) 40   Low Pressure (cm H2O) 10   Minute Ventilation (L/Min) 3   High RR (breaths/min) 40   Low RR (breaths/min) 10   Apnea (Sec) 20   Education   $ Education Bronchodilator;15 min;BiPAP   Respiratory Evaluation   $ Care Plan Tech Time 15 min   $ Eval/Re-eval Charges Re-evaluation

## 2023-06-04 NOTE — PHARMACY MED REC
"        Admission Medication History     The home medication history was taken by Niko Dan.    You may go to "Admission" then "Reconcile Home Medications" tabs to review and/or act upon these items.     The home medication list has been updated by the Pharmacy department.   Please read ALL comments highlighted in yellow.   Please address this information as you see fit.    Feel free to contact us if you have any questions or require assistance.        Medications listed below were obtained from: Patient/family, Analytic software- [employer], and Medical records  No current facility-administered medications on file prior to encounter.     Current Outpatient Medications on File Prior to Encounter   Medication Sig Dispense Refill    albuterol (PROVENTIL) 2.5 mg /3 mL (0.083 %) nebulizer solution Take 2.5 mg by nebulization 2 (two) times a day.      albuterol (PROVENTIL/VENTOLIN HFA) 90 mcg/actuation inhaler Inhale 1-2 puffs into the lungs every 6 (six) hours as needed.      amitriptyline (ELAVIL) 50 MG tablet Take 50 mg by mouth every evening.      amLODIPine (NORVASC) 5 MG tablet Take 5 mg by mouth once daily.      BREZTRI AEROSPHERE 160-9-4.8 mcg/actuation HFAA Inhale 2 puffs into the lungs 2 (two) times daily.      famotidine (PEPCID) 20 MG tablet Take 20 mg by mouth 2 (two) times daily.      FEROSUL 325 mg (65 mg iron) Tab tablet Take 325 mg by mouth every other day.      fluticasone propionate (FLONASE) 50 mcg/actuation nasal spray 1 spray by Each Nostril route once daily.      levothyroxine (SYNTHROID) 125 MCG tablet Take 125 mcg by mouth once daily.      oxyCODONE-acetaminophen (PERCOCET)  mg per tablet Take 1 tablet by mouth every 8 (eight) hours as needed for Pain.      roflumilast (DALIRESP) 500 mcg Tab Take 500 mcg by mouth once daily.      sodium chloride 3% 3 % nebulizer solution Take 4 mLs by nebulization 2 (two) times a day.      tobramycin (BETHKIS) 300 mg/4 mL Nebu Inhale 300 mg " into the lungs 2 (two) times a day. 1 month on and 1 month off      predniSONE (DELTASONE) 20 MG tablet 20 mg once daily.         Potential issues to be addressed PRIOR TO DISCHARGE  Patient reported not taking the following medications: (Prednisone 20 mg- Therapy complete). These medications remain on the home medication list. Please address accordingly.     Niko Dan  [id number]          .

## 2023-06-04 NOTE — CONSULTS
"[facility] - Emergency Dept  Adult Nutrition   Consult Note (Nutrition Support Management)    SUMMARY     Recommendations  1. Recommend Pulmocare @ 55 ml/hr. (1980 kcal, 83 g protein, 1036 ml H20) Start feeding at 20 ml and increase by 10 ml q 6 hrs as tolerated till goal rate reached. Recommend 50 ml FWF q 4 hrs.   2. RD to monitor TF rate/tolerance, weight and labs.    Goals: Tolerate TF at goal rate to meet EEN/EPN. Avoid weight loss.  Nutrition Goal Status: new    Dietitian Rounds Brief  PT admitted for SOB. Pt is currently NPO and has a PEG tube for nutrition support. Pt had been on Glucerna 1.5 in the past however with pt history of COPD I recommend pulmocare.    Diet order:   Current Diet Order: NPO                 Evaluation of Received Nutrient/Fluid Intake  Energy Calories Required: not meeting needs  Protein Required: not meeting needs  Fluid Required: not meeting needs     % Intake of Estimated Energy Needs: 0 - 25 %  % Meal Intake: 0 - 25 %    No intake or output data in the 24 hours ending 06/04/23 0803     Anthropometrics  Temp: 99.6 °F (37.6 °C)  Height Method: Stated  Height: 5' 5" (165.1 cm)  Height (inches): 65 in  Weight Method: Estimated  Weight: 59 kg (130 lb)  Weight (lb): 130 lb  Ideal Body Weight (IBW), Male: 136 lb  BMI Grade: 18.5-24.9 - normal       Estimated/Assessed Needs  Weight Used For Calorie Calculations: 59 kg (130 lb 1.1 oz)  Energy Calorie Requirements (kcal): 8423-7322 kcal (25-30 kcal/ kg bw)     Protein Requirements: 89-118g (1.5-2.0g/ kg bw)  Weight Used For Protein Calculations: 59 kg (130 lb 1.1 oz)     Estimated Fluid Requirement Method: RDA Method  RDA Method (mL): 1475       Reason for Assessment  Reason For Assessment: consult  Relevant Medical History: Pt admitted with SOB. 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  Interdisciplinary Rounds: did not attend    Nutrition/Diet History  Typical Food/Fluid Intake: NPO  Food Allergies: NKFA  Factors Affecting Nutritional Intake: None identified at this time  Nutrition Support Formula Prior to Admit: Glucerna 1.5  Nutrtion Support Frequency Prior to Admit: Glucerna 1.5 bolus feeds 5 cans per day with 60 ml FWF with each bolus.    Nutrition Risk Screen                Weight History:  Wt Readings from Last 10 Encounters:   06/04/23 59 kg (130 lb)        Lab/Procedures/Meds: Pertinent Labs/Meds Reviewed    Medications:Pertinent Medications Reviewed  Scheduled Meds:   albuterol-ipratropium  3 mL Nebulization Q4H    azithromycin  500 mg Intravenous Q24H    methylPREDNISolone sodium succinate  80 mg Intravenous Q8H    sodium chloride 0.9%  1,000 mL Intravenous Once     Continuous Infusions:  PRN Meds:.dextrose 50%, dextrose 50%, glucagon (human recombinant), glucose, glucose, naloxone, sodium chloride 0.9%    Labs: Pertinent Labs Reviewed  Clinical Chemistry:  Recent Labs   Lab 06/04/23 0226      K 4.6   CL 98   CO2 36*   *   BUN 24*   CREATININE 0.7   CALCIUM 9.2   PROT 8.2   ALBUMIN 3.3*   BILITOT 0.4   ALKPHOS 85   AST 25   ALT 46*   ANIONGAP 11   MG 2.4     CBC:   Recent Labs   Lab 06/04/23 0226 06/04/23  0250   WBC 13.49*  --    RBC 4.81  --    HGB 12.9*  --    HCT 45.9 41   *  --    MCV 95  --    MCH 26.8*  --    MCHC 28.1*  --      Lipid Panel:  No results for input(s): CHOL, HDL, LDLCALC, TRIG, CHOLHDL in the last 168 hours.  Cardiac Profile:  Recent Labs   Lab 06/04/23 0226   BNP 52     Inflammatory Labs:  No results for input(s): CRP in the last 168 hours.  Diabetes:  No results for input(s): HGBA1C, POCTGLUCOSE in the last 168 hours.  Thyroid & Parathyroid:  No results for input(s): TSH, FREET4, T5HISJU, D8PMEPA, THYROIDAB in the last 168 hours.    Monitor and Evaluation  Food and Nutrient  Intake: enteral nutrition intake  Food and Nutrient Adminstration: enteral and parenteral nutrition administration     Nutrition Risk  Level of Risk/Frequency of Follow-up: high     Nutrition Follow-Up  RD Follow-up?: Yes      Stacy Argueta RD 06/04/2023 8:03 AM

## 2023-06-04 NOTE — PLAN OF CARE
Problem: Feeding Intolerance (Enteral Nutrition)  Goal: Feeding Tolerance  Intervention: Prevent and Manage Feeding Intolerance  Flowsheets (Taken 6/4/2023 0802)  Nutrition Support Management: tube feeding initiated

## 2023-06-04 NOTE — HPI
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  He was diagnosed with tonsillar cancer 4 years ago and completed the treatment and following up with the Oncology and currently in remission.  He is ex-smoker about 4 years and stopped after he got diagnosis of cancer.  This evening he started having shortness of breath and wheezing and brought to the emergency room.  ABG was done in the emergency room with hypercapnia and started BiPAP.  Later CT chest was done no pulmonary embolism and no infiltrate and chronic changes.  There is no  record in this hospital and patient always follow-up with [facility].  On examination patient is on   BiPAP and history mostly from wife.  Patient needed PEG tube feeding.  Lung exam with limited air entry.  ABG with a respiratory acidosis and will continue BiPAP.

## 2023-06-04 NOTE — CONSULTS
"  06/04/2023      Admit Date: 6/4/2023  Reji Romano  New Patient Consult    Chief Complaint   Patient presents with    Shortness of Breath       History of Present Illness:  Pt is a 67 yo male with COPD, chronic bronchiectasis, chronic resp failure on home O2, laryngeal ca s/p treatment and reconstruction (6yr ago), dysphagia s/p PEG who presented to the ED due to confusion and respiratory failure. He required bipap in the ED then failed bipap and was intubated. His wife provides hx. She he is patient was more lethargic 2 days and then episode of urinary incontinence. Last night he was excessively confused and disoriented so she called EMS.  She states he does have chronic shortness breath and chronic mucopurulent cough. He uses percussive vest, nebulizer, home O2 2L, daliresp, azithromycin, bactrim and Esequiel nebs off and on. He follows at [facility] for pulmonary care. RT suctioned copious tan colored material out of his airway. Pt has chronic pain of neck and legs, takes oxycodone. Wife states his dose was just doubled about 1 month ago. He gets tube feeds via peg and nothing by mouth.      PFSH:  No past medical history on file.  No past surgical history on file.  Social History     Tobacco Use    Smoking status: Former     Types: Cigarettes   Substance Use Topics    Alcohol use: Not Currently    Drug use: Not Currently     No family history on file.  Review of patient's allergies indicates:   Allergen Reactions    Propoxyphene Anaphylaxis       Performance Status:Performance Status:The patient's activity level is was not done.    Review of Systems:  due to neurologic status/impairments a Review of Systems could not be obtained       Exam:Comprehensive exam done. /72   Pulse 85   Temp 99.6 °F (37.6 °C) (Oral)   Resp (!) 28   Ht 5' 5" (1.651 m)   Wt 59 kg (130 lb)   SpO2 100%   BMI 21.63 kg/m²   Exam included Vitals as listed, and patient's appearance and affect and alertness and mood, oral exam for yeast " and hygiene and pharynx lesions and Mallapatti (M) score, neck with inspection for jvd and masses and thyroid abnormalities and lymph nodes (supraclavicular and infraclavicular nodes also examined and noted if abn), chest exam included symmetry and effort and fremitus and percussion and auscultation, cardiac exam included rhythm and gallops and murmur and rubs and jvd and edema, abdominal exam for mass and hepatosplenomegaly and tenderness and hernias and bowel sounds, Musculoskeletal exam with muscle tone and posture and mobility/gait and  strenght, and skin for rashes and cyanosis and pallor and turgor, extremity for clubbing.  Findings were normal except as listed below:  Intubated, sedated  Pupils constricted and equal  Healed skin flap submental area  Bilateral diminished breath sounds  Abd soft, PEG in place, BS+  Scars over legs, no edema  Tremors of extremities - intermittent      Radiographs reviewed: view by direct vision   CTA chest 6/4/23- no PE; mild diffuse bronchiectasis, emphysema, lung fields clear      Labs     Sputum cx 3/29/230   ture, Respiratory with Smear  Moderate Growth Normal Oropharyngeal Anat    Culture, Respiratory with Smear  Moderate Growth Candida albicans Abnormal     Gram Stain  3+ Borderline (>25 WBC,>25 SQ EPI PER LPF)    Gram Stain  Normal Oropharyngeal Anat      sputum cx 1/31/23-   Component 4 mo ago   Culture, Respiratory with Smear  Heavy Growth Pseudomonas aeruginosa Abnormal     Culture, Respiratory with Smear  Heavy Growth Streptococcus agalactiae Abnormal     Culture, Respiratory with Smear  Light Growth Normal Oropharyngeal Anat    Gram Stain  3+ Borderline (>25 WBC,>25 SQ EPI PER LPF)    Gram Stain  Normal Oropharyngeal Anat      Recent Labs   Lab 06/04/23 0226 06/04/23  0250 06/04/23  1416   WBC 13.49*  --   --    HGB 12.9*  --   --    HCT 45.9   < > 35*   *  --   --     < > = values in this interval not displayed.     Recent Labs   Lab 06/04/23 0226  06/04/23  0610     --    K 4.6  --    CL 98  --    CO2 36*  --    BUN 24*  --    CREATININE 0.7  --    *  --    CALCIUM 9.2  --    MG 2.4  --    AST 25  --    ALT 46*  --    ALKPHOS 85  --    BILITOT 0.4  --    PROT 8.2  --    ALBUMIN 3.3*  --    LACTATE  --  1.5   BNP 52  --      Recent Labs   Lab 06/04/23  1416   PH 7.421   PCO2 51.7*   PO2 84   HCO3 33.6*     Microbiology Results (last 7 days)       Procedure Component Value Units Date/Time    Blood Culture #1 **CANNOT BE ORDERED STAT** [[id number]] Collected: 06/04/23 0410    Order Status: Completed Specimen: Blood from Peripheral, Forearm, Left Updated: 06/04/23 1317     Blood Culture, Routine No Growth to date    Blood Culture #2 **CANNOT BE ORDERED STAT** [[id number]] Collected: 06/04/23 0611    Order Status: Completed Specimen: Blood from Peripheral, Forearm, Left Updated: 06/04/23 1317     Blood Culture, Routine No Growth to date    Culture, Respiratory with Gram Stain [[id number]] Collected: 06/04/23 0923    Order Status: Sent Specimen: Respiratory from Endotracheal Aspirate Updated: 06/04/23 0943            Impression:  Active Hospital Problems    Diagnosis  POA    Laryngeal cancer s/p surgery and chemo [C32.9]  Unknown    COPD exacerbation [J44.1]  Unknown    PEG tube  [K94.23]  Unknown    Acute hypercapnic respiratory failure [J96.02]  Unknown    On home oxygen therapy [Z99.81]  Not Applicable    Ex-smoker [Z87.891]  Not Applicable    Bronchiectasis [J47.9]  Unknown      Resolved Hospital Problems   No resolved problems to display.               Plan:   Acute on chronic hypercapneic resp failure  COPD w/ acute exacerbation  Bronchiectasis w/ recurrent infections  Hx laryngeal cancer in remission  Chronic pain- narcotic pain meds increased 1 month prior     - continue vent support  - ABGs reviewed and sedation adjusted- gas exchange improved  - SAT/SBT when improved  - sedation w/ propofol, fentanyl  - Culture sputum  - continue steroids   -  continue azithromycin  - continue inhaled bronchodilators  - tube feeds via PEG  - prophylactic dose lovenox  - pepcid      The following were evaluated and adjusted as needed: mechanical ventilator settings and weaning status, intravenous fluids and nutritional status, sedation and neurologic status, antibiotics, support tubes and access lines and invasive monitoring, acid base balance and oxygenation needs, and CODE STATUS/OUTLOOK DISCUSSED WITH AVAILABLE NEXT OF  KIN       Critical Care  - THE PATIENT HAS A HIGH PROBABILITY OF IMMINENT OR LIFE THREATENING DETERIORATION.  Over 50%time of encounter was in direct care at bedside.  Time was 30 to 74 minutes required for patient care.  Time needed for all of the above totaled 40 minutes.    Claudette Thompson MD  Pulmonary & Critical Care Medicine

## 2023-06-04 NOTE — ED PROVIDER NOTES
Encounter Date: 6/4/2023       History     Chief Complaint   Patient presents with    Shortness of Breath     [age] male with past medical history of lung cancer presents secondary to shortness of breath.  Patient was brought in by EMS after developing shortness of breath while at home.  He denies any chest pain, nausea, vomiting or fevers.  Patient is otherwise stable and has no other complaints.    Review of patient's allergies indicates:  No Known Allergies  No past medical history on file.  No past surgical history on file.  No family history on file.  Social History     Tobacco Use    Smoking status: Former     Types: Cigarettes   Substance Use Topics    Alcohol use: Not Currently    Drug use: Not Currently     Review of Systems   Unable to perform ROS: Severe respiratory distress   Respiratory:  Positive for shortness of breath.      Physical Exam     Initial Vitals   BP Pulse Resp Temp SpO2   06/04/23 0222 06/04/23 0222 06/04/23 0222 06/04/23 0228 06/04/23 0222   (!) 133/90 (!) 129 (!) 22 99.6 °F (37.6 °C) 100 %      MAP       --                Physical Exam    Nursing note and vitals reviewed.  Constitutional: He appears well-developed and well-nourished. He is not diaphoretic. He appears distressed.   HENT:   Head: Normocephalic and atraumatic.   Mouth/Throat: Oropharynx is clear and moist.   Eyes: Conjunctivae and EOM are normal. Pupils are equal, round, and reactive to light.   Neck: Neck supple. No thyromegaly present. No JVD present.   Normal range of motion.  Cardiovascular:  Normal rate, regular rhythm and normal heart sounds.     Exam reveals no gallop and no friction rub.       No murmur heard.  Pulmonary/Chest: Breath sounds normal. Accessory muscle usage present. Tachypnea noted. No respiratory distress. He has no wheezes. He exhibits no tenderness.   Abdominal: Abdomen is soft. Bowel sounds are normal. He exhibits no distension. There is no abdominal tenderness. There is no rebound and  no guarding.   Musculoskeletal:         General: No tenderness or edema. Normal range of motion.      Cervical back: Normal range of motion and neck supple.     Neurological: He is alert and oriented to person, place, and time. He has normal strength. No cranial nerve deficit or sensory deficit.   Skin: Skin is warm and dry. Capillary refill takes less than 2 seconds. No rash noted. No erythema.   Psychiatric: He has a normal mood and affect.       ED Course   Critical Care    Date/Time: 6/4/2023 5:57 AM  Performed by: Deangelo Orozco MD  Authorized by: Deangelo Orozco MD   Direct patient critical care time: 10 minutes  Additional history critical care time: 5 minutes  Ordering / reviewing critical care time: 10 minutes  Documentation critical care time: 10 minutes  Consulting other physicians critical care time: 5 minutes  Total critical care time (exclusive of procedural time) : 40 minutes  Critical care was necessary to treat or prevent imminent or life-threatening deterioration of the following conditions: respiratory failure.  Critical care was time spent personally by me on the following activities: development of treatment plan with patient or surrogate, discussions with consultants, evaluation of patient's response to treatment, examination of patient, ordering and performing treatments and interventions, ordering and review of laboratory studies, ordering and review of radiographic studies, re-evaluation of patient's condition and review of old charts.      Labs Reviewed   CBC W/ AUTO DIFFERENTIAL - Abnormal; Notable for the following components:       Result Value    WBC 13.49 (*)     Hemoglobin 12.9 (*)     MCH 26.8 (*)     MCHC 28.1 (*)     RDW 15.2 (*)     Platelets 642 (*)     Gran # (ANC) 12.0 (*)     Immature Grans (Abs) 0.07 (*)     Lymph # 0.5 (*)     Gran % 89.1 (*)     Lymph % 3.5 (*)     Mono % 3.1 (*)     All other components within normal limits   COMPREHENSIVE METABOLIC PANEL - Abnormal;  Notable for the following components:    CO2 36 (*)     Glucose 137 (*)     BUN 24 (*)     Albumin 3.3 (*)     ALT 46 (*)     All other components within normal limits   POCT GLUCOSE - Abnormal; Notable for the following components:    POC Glucose 132 (*)     All other components within normal limits   ISTAT PROCEDURE - Abnormal; Notable for the following components:    POC PH 7.288 (*)     POC PCO2 83.2 (*)     POC PO2 68 (*)     POC HCO3 39.8 (*)     POC SATURATED O2 90 (*)     POC Glucose 153 (*)     POC TCO2 42 (*)     All other components within normal limits   CULTURE, BLOOD   CULTURE, BLOOD   TROPONIN I HIGH SENSITIVITY   B-TYPE NATRIURETIC PEPTIDE   MAGNESIUM   LACTIC ACID, PLASMA        ECG Results              EKG 12-lead (In process)  Result time 06/04/23 05:19:47      In process by Interface, Lab In [facility] (06/04/23 05:19:47)                   Narrative:    Test Reason : R06.02,    Vent. Rate : 124 BPM     Atrial Rate : 124 BPM     P-R Int : 164 ms          QRS Dur : 068 ms      QT Int : 298 ms       P-R-T Axes : -28 078 -49 degrees     QTc Int : 428 ms    Sinus tachycardia  Nonspecific T wave abnormality  Abnormal ECG  No previous ECGs available    Referred By: [email or web address]   SELF           Confirmed By:                                   Imaging Results              CTA Chest Non-Coronary (PE Studies) (Final result)  Result time 06/04/23 04:53:13      Final result by Azucena Hernández MD (06/04/23 04:53:13)                   Narrative:    EXAM DESCRIPTION:  CTA CHEST NON CORONARY (PE STUDIES) 6/4/2023 4:22 AM CDT  [facility]: CT CHEST ANGIOGRAPHY WITH IV CONTRAST    CLINICAL HISTORY:  68 years Male, Pulmonary embolism (PE) suspected, unknown D-dimer    COMPARISON:  None    TECHNIQUE: Contiguous axial images through the chest during IV contrast administration using pulmonary artery embolism protocol. Multiplanar postprocessing 3D MIP reformatted images were obtained.    The protocol utilizes one or more of the  following dose reduction techniques:  automated exposure control, adjustment of mA and/or kV according to patient size, and/or use of iterative reconstruction technique.    FINDINGS: Quality of examination is degraded secondary to patient respiratory motion artifact.    PULMONARY ARTERIES: There is no evidence of pulmonary embolism.    LUNGS: Emphysematous changes are noted. No mass or consolidation is identified in the lungs. Nonspecific peribronchial wall thickening is appreciated along with suggestion of multiple small scattered areas of distal mucous plugging in bilateral lung bases.    PLEURA: There is no pleural effusion or pneumothorax.    CARDIOVASCULAR: There is no pericardial effusion. Scattered coronary artery calcifications are seen.    NODES: No significantly enlarged mediastinal or hilar lymph nodes are identified.    BONES: No evidence of acute osseous abnormality. Degenerative changes of the thoracic spine are visualized.    IMPRESSION:  1. NO EVIDENCE OF PULMONARY EMBOLISM WITHIN THE LIMITATIONS OF RESPIRATORY MOTION ARTIFACT.  2. NO EVIDENCE OF ACUTE INFILTRATE.  3. PLEASE REFER TO BODY OF DETAILED REPORT ABOVE FOR ADDITIONAL FINDINGS.    Electronically signed by:  Azucena Hernández MD  6/4/2023 4:53 AM CDT Workstation: [id number]                                     X-Ray Chest AP Portable (Final result)  Result time 06/04/23 04:54:36      Final result by Azucena Hernández MD (06/04/23 04:54:36)                   Narrative:    EXAM DESCRIPTION:  XR CHEST AP PORTABLE 6/4/2023 2:20 AM CDT  [facility]: XR CHEST 1 VIEW    CLINICAL HISTORY:  68 years Male, CHF    COMPARISON:  None    TECHNIQUE: Single AP radiographic view of the chest    FINDINGS:  No evidence of focal consolidation. Chronic interstitial lung changes are appreciated. No evidence of significant pleural effusion or pneumothorax. Cardiomediastinal silhouette is within normal limits. Osseous structures appear demineralized.    IMPRESSION: No  radiographic evidence of acute infiltrate    Electronically signed by:  Azucena Hernández MD  6/4/2023 4:54 AM CDT Workstation: [id number]                                     Medications   sodium chloride 0.9% bolus 1,000 mL 1,000 mL (1,000 mLs Intravenous New Bag 6/4/23 0556)   sodium chloride 0.9% flush 10 mL (has no administration in time range)   naloxone 0.4 mg/mL injection 0.02 mg (has no administration in time range)   glucose chewable tablet 16 g (has no administration in time range)   glucose chewable tablet 24 g (has no administration in time range)   dextrose 50% injection 12.5 g (has no administration in time range)   dextrose 50% injection 25 g (has no administration in time range)   glucagon (human recombinant) injection 1 mg (has no administration in time range)   sodium chloride 0.9% bolus 1,000 mL 1,000 mL (has no administration in time range)   albuterol-ipratropium 2.5 mg-0.5 mg/3 mL nebulizer solution 3 mL (has no administration in time range)   methylPREDNISolone sodium succinate injection 80 mg (80 mg Intravenous Given 6/4/23 0557)   azithromycin 500 mg in dextrose 5 % 250 mL IVPB (ready to mix system) (has no administration in time range)   iohexoL (OMNIPAQUE 350) injection 100 mL (100 mLs Intravenous Given 6/4/23 0424)   HYDROmorphone injection 1 mg (1 mg Intravenous Given 6/4/23 0556)     Medical Decision Making:   Initial Assessment:   [age] initial assessment in moderate distress secondary to shortness of breath.  Patient is alert and oriented x3, neurologically and neurovascular intact with no focal deficits.  He is tachypneic and hypoxic but otherwise nontoxic appearing vitals stable at this time.  Differential Diagnosis:   COPD versus CHF exacerbation, pneumonia, PE  Independently Interpreted Test(s):   I have ordered and independently interpreted X-rays - see prior notes.  I have ordered and independently interpreted EKG Reading(s) - see prior notes  Clinical Tests:   Lab  Tests: Ordered and Reviewed  The following lab test(s) were unremarkable: CBC, CMP, Lactate, Troponin, BNP and Urinalysis  Radiological Study: Ordered and Reviewed  Medical Tests: Ordered and Reviewed  ED Management:  Patient has been reassessed noted to have no acute changes condition.  Patient with mild leukocytosis and continued BiPAP use will be admitted to hospitalist secondary to COPD exacerbation.  He is remained stable while in the ED and Mr. Romano is aware of the plan and in agreement with admission.    Laboratory results and radiology imaging results reviewed.  Based on the patient's history, physical, and workup here in the emergency department I believe the patient requires admission for a diagnosis of hypoxia and COPD.  I discussed the patient's case with the on-call hospitalist who has agreed to evaluate the patient for admission.  In addition, I discussed the results with the patient and they have verbalized understanding of the results, plan, and need for admission.    ________________________  LISA Orozco MD   Emergency Medicine                          Clinical Impression:   Final diagnoses:  [R06.02] Shortness of breath  [R09.02] Hypoxia (Primary)  [R06.00] Dyspnea, unspecified type  [J44.1] COPD exacerbation        ED Disposition Condition    Admit                 Deangelo Orozco MD  06/04/23 0602

## 2023-06-05 PROBLEM — R78.81 BACTEREMIA: Status: ACTIVE | Noted: 2023-06-05

## 2023-06-05 PROBLEM — R57.9 SHOCK, UNSPECIFIED: Status: ACTIVE | Noted: 2023-06-05

## 2023-06-05 LAB
ACINETOBACTER CALCOACETICUS/BAUMANNII COMPLEX: NOT DETECTED
ALLENS TEST: ABNORMAL
ANION GAP SERPL CALC-SCNC: 6 MMOL/L (ref 8–16)
BACTEROIDES FRAGILIS: NOT DETECTED
BASOPHILS # BLD AUTO: 0.01 K/UL (ref 0–0.2)
BASOPHILS NFR BLD: 0.1 % (ref 0–1.9)
BUN SERPL-MCNC: 19 MG/DL (ref 8–23)
CALCIUM SERPL-MCNC: 8.1 MG/DL (ref 8.7–10.5)
CANDIDA ALBICANS: NOT DETECTED
CANDIDA AURIS: NOT DETECTED
CANDIDA GLABRATA: NOT DETECTED
CANDIDA KRUSEI: NOT DETECTED
CANDIDA PARAPSILOSIS: NOT DETECTED
CANDIDA TROPICALIS: NOT DETECTED
CHLORIDE SERPL-SCNC: 106 MMOL/L (ref 95–110)
CO2 SERPL-SCNC: 32 MMOL/L (ref 23–29)
CREAT SERPL-MCNC: 0.6 MG/DL (ref 0.5–1.4)
CRYPTOCOCCUS NEOFORMANS/GATTII: NOT DETECTED
CTX-M GENE: ABNORMAL
DELSYS: ABNORMAL
DIFFERENTIAL METHOD: ABNORMAL
ENTEROBACTER CLOACAE COMPLEX: NOT DETECTED
ENTEROBACTERALES: NOT DETECTED
ENTEROCOCCUS FAECALIS: NOT DETECTED
ENTEROCOCCUS FAECIUM: NOT DETECTED
EOSINOPHIL # BLD AUTO: 0 K/UL (ref 0–0.5)
EOSINOPHIL NFR BLD: 0 % (ref 0–8)
ERYTHROCYTE [DISTWIDTH] IN BLOOD BY AUTOMATED COUNT: 15 % (ref 11.5–14.5)
ERYTHROCYTE [SEDIMENTATION RATE] IN BLOOD BY WESTERGREN METHOD: 20 MM/H
ERYTHROCYTE [SEDIMENTATION RATE] IN BLOOD BY WESTERGREN METHOD: 28 MM/H
ESCHERICHIA COLI: NOT DETECTED
EST. GFR  (NO RACE VARIABLE): >60 ML/MIN/1.73 M^2
FIO2: 30
FIO2: 30
FIO2: 35
FLOW: 5
GLUCOSE SERPL-MCNC: 130 MG/DL (ref 70–110)
GLUCOSE SERPL-MCNC: 173 MG/DL (ref 70–110)
GLUCOSE SERPL-MCNC: 180 MG/DL (ref 70–110)
GLUCOSE SERPL-MCNC: 188 MG/DL (ref 70–110)
HAEMOPHILUS INFLUENZAE: NOT DETECTED
HCO3 UR-SCNC: 34.4 MMOL/L (ref 24–28)
HCO3 UR-SCNC: 34.8 MMOL/L (ref 24–28)
HCO3 UR-SCNC: 34.8 MMOL/L (ref 24–28)
HCO3 UR-SCNC: 35.7 MMOL/L (ref 24–28)
HCT VFR BLD AUTO: 33.1 % (ref 40–54)
HCT VFR BLD CALC: 30 %PCV (ref 36–54)
HCT VFR BLD CALC: 31 %PCV (ref 36–54)
HCT VFR BLD CALC: 32 %PCV (ref 36–54)
HGB BLD-MCNC: 9.8 G/DL (ref 14–18)
IMM GRANULOCYTES # BLD AUTO: 0.05 K/UL (ref 0–0.04)
IMM GRANULOCYTES NFR BLD AUTO: 0.4 % (ref 0–0.5)
IMP GENE: ABNORMAL
KLEBSIELLA AEROGENES: NOT DETECTED
KLEBSIELLA OXYTOCA: NOT DETECTED
KLEBSIELLA PNEUMONIAE GROUP: NOT DETECTED
KPC: ABNORMAL
LISTERIA MONOCYTOGENES: NOT DETECTED
LYMPHOCYTES # BLD AUTO: 0.4 K/UL (ref 1–4.8)
LYMPHOCYTES NFR BLD: 3.2 % (ref 18–48)
MCH RBC QN AUTO: 27.2 PG (ref 27–31)
MCHC RBC AUTO-ENTMCNC: 29.6 G/DL (ref 32–36)
MCR-1: ABNORMAL
MCV RBC AUTO: 92 FL (ref 82–98)
MEC A/C AND MREJ (MRSA): ABNORMAL
MEC A/C: ABNORMAL
MODE: ABNORMAL
MONOCYTES # BLD AUTO: 0.1 K/UL (ref 0.3–1)
MONOCYTES NFR BLD: 1.1 % (ref 4–15)
NDM GENE: ABNORMAL
NEISSERIA MENINGITIDIS: NOT DETECTED
NEUTROPHILS # BLD AUTO: 11.4 K/UL (ref 1.8–7.7)
NEUTROPHILS NFR BLD: 95.2 % (ref 38–73)
NRBC BLD-RTO: 0 /100 WBC
OXA-48-LIKE: ABNORMAL
PCO2 BLDA: 44.3 MMHG (ref 35–45)
PCO2 BLDA: 51.6 MMHG (ref 35–45)
PCO2 BLDA: 52.8 MMHG (ref 35–45)
PCO2 BLDA: 54.9 MMHG (ref 35–45)
PEEP: 5
PH SMN: 7.42 [PH] (ref 7.35–7.45)
PH SMN: 7.43 [PH] (ref 7.35–7.45)
PH SMN: 7.44 [PH] (ref 7.35–7.45)
PH SMN: 7.5 [PH] (ref 7.35–7.45)
PLATELET # BLD AUTO: 526 K/UL (ref 150–450)
PMV BLD AUTO: 10.4 FL (ref 9.2–12.9)
PO2 BLDA: 72 MMHG (ref 80–100)
PO2 BLDA: 88 MMHG (ref 80–100)
PO2 BLDA: 89 MMHG (ref 80–100)
PO2 BLDA: 95 MMHG (ref 80–100)
POC BE: 10 MMOL/L
POC BE: 11 MMOL/L
POC IONIZED CALCIUM: 1.15 MMOL/L (ref 1.06–1.42)
POC IONIZED CALCIUM: 1.17 MMOL/L (ref 1.06–1.42)
POC IONIZED CALCIUM: 1.18 MMOL/L (ref 1.06–1.42)
POC SATURATED O2: 94 % (ref 95–100)
POC SATURATED O2: 97 % (ref 95–100)
POC TCO2: 36 MMOL/L (ref 23–27)
POC TCO2: 37 MMOL/L (ref 23–27)
POTASSIUM BLD-SCNC: 3.7 MMOL/L (ref 3.5–5.1)
POTASSIUM BLD-SCNC: 3.8 MMOL/L (ref 3.5–5.1)
POTASSIUM BLD-SCNC: 3.9 MMOL/L (ref 3.5–5.1)
POTASSIUM SERPL-SCNC: 3.4 MMOL/L (ref 3.5–5.1)
PROTEUS SPECIES: NOT DETECTED
PS: 10
PSEUDOMONAS AERUGINOSA: NOT DETECTED
RBC # BLD AUTO: 3.6 M/UL (ref 4.6–6.2)
SALMONELLA SP: NOT DETECTED
SAMPLE: ABNORMAL
SERRATIA MARCESCENS: NOT DETECTED
SITE: ABNORMAL
SODIUM BLD-SCNC: 142 MMOL/L (ref 136–145)
SODIUM BLD-SCNC: 143 MMOL/L (ref 136–145)
SODIUM BLD-SCNC: 144 MMOL/L (ref 136–145)
SODIUM SERPL-SCNC: 144 MMOL/L (ref 136–145)
SPONT RATE: 16
STAPHYLOCOCCUS AUREUS: NOT DETECTED
STAPHYLOCOCCUS EPIDERMIDIS: NOT DETECTED
STAPHYLOCOCCUS LUGDUNESIS: NOT DETECTED
STAPHYLOCOCCUS SPECIES: NOT DETECTED
STENOTROPHOMONAS MALTOPHILIA: NOT DETECTED
STREPTOCOCCUS AGALACTIAE: DETECTED
STREPTOCOCCUS PNEUMONIAE: NOT DETECTED
STREPTOCOCCUS PYOGENES: NOT DETECTED
STREPTOCOCCUS SPECIES: ABNORMAL
VAN A/B: ABNORMAL
VIM GENE: ABNORMAL
VT: 370
VT: 370
WBC # BLD AUTO: 12.02 K/UL (ref 3.9–12.7)

## 2023-06-05 PROCEDURE — 25000003 PHARM REV CODE 250: Performed by: INTERNAL MEDICINE

## 2023-06-05 PROCEDURE — 99900031 HC PATIENT EDUCATION (STAT)

## 2023-06-05 PROCEDURE — 20000000 HC ICU ROOM

## 2023-06-05 PROCEDURE — 25000242 PHARM REV CODE 250 ALT 637 W/ HCPCS: Performed by: INTERNAL MEDICINE

## 2023-06-05 PROCEDURE — 94640 AIRWAY INHALATION TREATMENT: CPT

## 2023-06-05 PROCEDURE — 82330 ASSAY OF CALCIUM: CPT

## 2023-06-05 PROCEDURE — 37799 UNLISTED PX VASCULAR SURGERY: CPT

## 2023-06-05 PROCEDURE — 63600175 PHARM REV CODE 636 W HCPCS: Performed by: INTERNAL MEDICINE

## 2023-06-05 PROCEDURE — 80048 BASIC METABOLIC PNL TOTAL CA: CPT | Performed by: INTERNAL MEDICINE

## 2023-06-05 PROCEDURE — 84295 ASSAY OF SERUM SODIUM: CPT

## 2023-06-05 PROCEDURE — 99291 PR CRITICAL CARE, E/M 30-74 MINUTES: ICD-10-PCS | Mod: ,,, | Performed by: INTERNAL MEDICINE

## 2023-06-05 PROCEDURE — 94003 VENT MGMT INPAT SUBQ DAY: CPT

## 2023-06-05 PROCEDURE — 94761 N-INVAS EAR/PLS OXIMETRY MLT: CPT

## 2023-06-05 PROCEDURE — 36620 INSERTION CATHETER ARTERY: CPT

## 2023-06-05 PROCEDURE — 99291 CRITICAL CARE FIRST HOUR: CPT | Mod: ,,, | Performed by: INTERNAL MEDICINE

## 2023-06-05 PROCEDURE — 99900035 HC TECH TIME PER 15 MIN (STAT)

## 2023-06-05 PROCEDURE — 82803 BLOOD GASES ANY COMBINATION: CPT

## 2023-06-05 PROCEDURE — 99900026 HC AIRWAY MAINTENANCE (STAT)

## 2023-06-05 PROCEDURE — 85025 COMPLETE CBC W/AUTO DIFF WBC: CPT | Performed by: INTERNAL MEDICINE

## 2023-06-05 PROCEDURE — 63600175 PHARM REV CODE 636 W HCPCS: Performed by: EMERGENCY MEDICINE

## 2023-06-05 PROCEDURE — C9113 INJ PANTOPRAZOLE SODIUM, VIA: HCPCS | Performed by: INTERNAL MEDICINE

## 2023-06-05 PROCEDURE — 94150 VITAL CAPACITY TEST: CPT

## 2023-06-05 PROCEDURE — 84132 ASSAY OF SERUM POTASSIUM: CPT

## 2023-06-05 PROCEDURE — 85014 HEMATOCRIT: CPT

## 2023-06-05 PROCEDURE — 25000003 PHARM REV CODE 250: Performed by: EMERGENCY MEDICINE

## 2023-06-05 PROCEDURE — 94010 BREATHING CAPACITY TEST: CPT

## 2023-06-05 PROCEDURE — 27000221 HC OXYGEN, UP TO 24 HOURS

## 2023-06-05 RX ORDER — CALCIUM GLUCONATE 20 MG/ML
3 INJECTION, SOLUTION INTRAVENOUS
Status: DISCONTINUED | OUTPATIENT
Start: 2023-06-05 | End: 2023-06-08 | Stop reason: HOSPADM

## 2023-06-05 RX ORDER — POTASSIUM CHLORIDE 7.45 MG/ML
40 INJECTION INTRAVENOUS
Status: DISCONTINUED | OUTPATIENT
Start: 2023-06-05 | End: 2023-06-08 | Stop reason: HOSPADM

## 2023-06-05 RX ORDER — MAGNESIUM SULFATE HEPTAHYDRATE 40 MG/ML
2 INJECTION, SOLUTION INTRAVENOUS
Status: DISCONTINUED | OUTPATIENT
Start: 2023-06-05 | End: 2023-06-08 | Stop reason: HOSPADM

## 2023-06-05 RX ORDER — CALCIUM GLUCONATE 20 MG/ML
2 INJECTION, SOLUTION INTRAVENOUS
Status: DISCONTINUED | OUTPATIENT
Start: 2023-06-05 | End: 2023-06-08 | Stop reason: HOSPADM

## 2023-06-05 RX ORDER — CALCIUM GLUCONATE 20 MG/ML
1 INJECTION, SOLUTION INTRAVENOUS
Status: DISCONTINUED | OUTPATIENT
Start: 2023-06-05 | End: 2023-06-08 | Stop reason: HOSPADM

## 2023-06-05 RX ORDER — MAGNESIUM SULFATE HEPTAHYDRATE 40 MG/ML
4 INJECTION, SOLUTION INTRAVENOUS
Status: DISCONTINUED | OUTPATIENT
Start: 2023-06-05 | End: 2023-06-08 | Stop reason: HOSPADM

## 2023-06-05 RX ORDER — PANTOPRAZOLE SODIUM 40 MG/10ML
40 INJECTION, POWDER, LYOPHILIZED, FOR SOLUTION INTRAVENOUS DAILY
Status: DISCONTINUED | OUTPATIENT
Start: 2023-06-05 | End: 2023-06-07

## 2023-06-05 RX ADMIN — METHYLPREDNISOLONE SODIUM SUCCINATE 80 MG: 40 INJECTION, POWDER, FOR SOLUTION INTRAMUSCULAR; INTRAVENOUS at 05:06

## 2023-06-05 RX ADMIN — PROPOFOL 50 MCG/KG/MIN: 10 INJECTION, EMULSION INTRAVENOUS at 01:06

## 2023-06-05 RX ADMIN — CEFTRIAXONE SODIUM 2 G: 2 INJECTION, POWDER, FOR SOLUTION INTRAMUSCULAR; INTRAVENOUS at 05:06

## 2023-06-05 RX ADMIN — POTASSIUM CHLORIDE 40 MEQ: 7.46 INJECTION, SOLUTION INTRAVENOUS at 12:06

## 2023-06-05 RX ADMIN — NOREPINEPHRINE BITARTRATE 0.05 MCG/KG/MIN: 4 INJECTION, SOLUTION INTRAVENOUS at 07:06

## 2023-06-05 RX ADMIN — METHYLPREDNISOLONE SODIUM SUCCINATE 80 MG: 40 INJECTION, POWDER, FOR SOLUTION INTRAMUSCULAR; INTRAVENOUS at 10:06

## 2023-06-05 RX ADMIN — METHYLPREDNISOLONE SODIUM SUCCINATE 80 MG: 40 INJECTION, POWDER, FOR SOLUTION INTRAMUSCULAR; INTRAVENOUS at 03:06

## 2023-06-05 RX ADMIN — AZITHROMYCIN MONOHYDRATE 500 MG: 500 INJECTION, POWDER, LYOPHILIZED, FOR SOLUTION INTRAVENOUS at 05:06

## 2023-06-05 RX ADMIN — IPRATROPIUM BROMIDE AND ALBUTEROL SULFATE 3 ML: 2.5; .5 SOLUTION RESPIRATORY (INHALATION) at 03:06

## 2023-06-05 RX ADMIN — IPRATROPIUM BROMIDE AND ALBUTEROL SULFATE 3 ML: 2.5; .5 SOLUTION RESPIRATORY (INHALATION) at 07:06

## 2023-06-05 RX ADMIN — PROPOFOL 50 MCG/KG/MIN: 10 INJECTION, EMULSION INTRAVENOUS at 06:06

## 2023-06-05 RX ADMIN — POTASSIUM BICARBONATE 20 MEQ: 782 TABLET, EFFERVESCENT ORAL at 06:06

## 2023-06-05 RX ADMIN — CEFEPIME 1 G: 1 INJECTION, POWDER, FOR SOLUTION INTRAMUSCULAR; INTRAVENOUS at 12:06

## 2023-06-05 RX ADMIN — IPRATROPIUM BROMIDE AND ALBUTEROL SULFATE 3 ML: 2.5; .5 SOLUTION RESPIRATORY (INHALATION) at 12:06

## 2023-06-05 RX ADMIN — CHLORHEXIDINE GLUCONATE 15 ML: 1.2 RINSE ORAL at 12:06

## 2023-06-05 RX ADMIN — ENOXAPARIN SODIUM 40 MG: 100 INJECTION SUBCUTANEOUS at 04:06

## 2023-06-05 RX ADMIN — PANTOPRAZOLE SODIUM 40 MG: 40 INJECTION, POWDER, FOR SOLUTION INTRAVENOUS at 12:06

## 2023-06-05 RX ADMIN — IPRATROPIUM BROMIDE AND ALBUTEROL SULFATE 3 ML: 2.5; .5 SOLUTION RESPIRATORY (INHALATION) at 11:06

## 2023-06-05 RX ADMIN — CEFEPIME 1 G: 1 INJECTION, POWDER, FOR SOLUTION INTRAMUSCULAR; INTRAVENOUS at 08:06

## 2023-06-05 NOTE — PROGRESS NOTES
Pulmonary/Critical Care   progress note      06/05/2023      Admit Date: 6/4/2023  Reji Romano      Chief Complaint   Patient presents with    Shortness of Breath       History of Present Illness:  Pt is a 67 yo male with COPD, chronic bronchiectasis, chronic resp failure on home O2, laryngeal ca s/p treatment and reconstruction (6yr ago), dysphagia s/p PEG who presented to the ED due to confusion and respiratory failure. He required bipap in the ED then failed bipap and was intubated. His wife provides hx. She he is patient was more lethargic 2 days and then episode of urinary incontinence. Last night he was excessively confused and disoriented so she called EMS.  She states he does have chronic shortness breath and chronic mucopurulent cough. He uses percussive vest, nebulizer, home O2 2L, daliresp, azithromycin, bactrim and Jc nebs off and on. He follows at [facility] for pulmonary care. RT suctioned copious tan colored material out of his airway. Pt has chronic pain of neck and legs, takes oxycodone. Wife states his dose was just doubled about 1 month ago. He gets tube feeds via peg and nothing by mouth.    6/5 the patient's chest x-ray remains clear.  He is growing a Pseudomonas out of his sputum.  His wife does not know what is normal organism is.  She gives me his pulmonologist's name at [facility], Dr. Diaz, [phone]. I have left repeated messages for him.  His ventilatory requirements are small.  A CPAP trial was not done this morning.  Will proceed with 1 at this time with hopes to extubate.  The wife states that he is only on 2 L nasal cannula and moves very little because was told he could not have more oxygen because it was dangerous for him.  She states he desaturates when he walks even a short distance.  She states he is compliant with his vest.  The patient has Daliresp and azithromycin 3 times a week.  He has had JC in the past making me think that this Pseudomonas is simply a regular colonizer  "for him    No change in the patient's Past Medical History, Past Surgical History, Social History or Family History since admission.    Review of Systems:   Unobtainable      Exam:Comprehensive exam done. /66   Pulse 87   Temp 98.3 °F (36.8 °C)   Resp (!) 21   Ht 5' 5" (1.651 m)   Wt 57.6 kg (126 lb 15.8 oz)   SpO2 95%   BMI 21.13 kg/m²       PHYSICAL EXAM  GENERAL: Older patient in no distress, intubated on the ventilator  HEENT: Pupils equal and reactive. Extraocular movements intact. Nose intact. Pharynx intubated with ET tube.  NECK: Supple.   HEART: Regular rate and rhythm. No murmur or gallop auscultated.  LUNGS: Clear to auscultation and percussion. Lung excursion symmetrical. No change in fremitus. No adventitial noises.  ABDOMEN:  Peg tube in place.  Bowel sounds present. Non-tender, no masses palpated.  : Normal anatomy.  Lazaro with yellow urine.  EXTREMITIES: Normal muscle tone and joint movement, no cyanosis or clubbing.   LYMPHATICS: No adenopathy palpated, no edema.  SKIN: Dry, intact, no lesions.   NEURO: Cranial nerves II-XII intact. Motor strength 5/5 bilaterally, upper and lower extremities.  PSYCH: Appropriate affect.    Radiographs reviewed: view by direct vision   CTA chest 6/4/23- no PE; mild diffuse bronchiectasis, emphysema, lung fields clear  Chest x-ray 6/5 clear      Labs     Sputum cx 3/29/230   ture, Respiratory with Smear  Moderate Growth Normal Oropharyngeal Anat    Culture, Respiratory with Smear  Moderate Growth Candida albicans Abnormal     Gram Stain  3+ Borderline (>25 WBC,>25 SQ EPI PER LPF)    Gram Stain  Normal Oropharyngeal Anat      sputum cx 1/31/23-   Component 4 mo ago   Culture, Respiratory with Smear  Heavy Growth Pseudomonas aeruginosa Abnormal     Culture, Respiratory with Smear  Heavy Growth Streptococcus agalactiae Abnormal     Culture, Respiratory with Smear  Light Growth Normal Oropharyngeal Anat    Gram Stain  3+ Borderline (>25 WBC,>25 SQ EPI " PER LPF)    Gram Stain  Normal Oropharyngeal Anat      Recent Labs   Lab 06/05/23  0356 06/05/23  0841   WBC 12.02  --    HGB 9.8*  --    HCT 33.1* 30*   *  --      Recent Labs   Lab 06/05/23 0356      K 3.4*      CO2 32*   BUN 19   CREATININE 0.6   *   CALCIUM 8.1*     Recent Labs   Lab 06/05/23  0841   PH 7.427   PCO2 52.8*   PO2 72*   HCO3 34.8*     Microbiology Results (last 7 days)       Procedure Component Value Units Date/Time    Culture, Respiratory with Gram Stain [[id number]]  (Abnormal) Collected: 06/04/23 0923    Order Status: Completed Specimen: Respiratory from Endotracheal Aspirate Updated: 06/05/23 0800     Respiratory Culture PRESUMPTIVE PSEUDOMONAS SPECIES  Identification and susceptibility pending      Blood Culture #2 **CANNOT BE ORDERED STAT** [[id number]]  (Abnormal) Collected: 06/04/23 0611    Order Status: Completed Specimen: Blood from Peripheral, Forearm, Left Updated: 06/05/23 0718     Blood Culture, Routine Gram stain aer bottle: Gram positive cocci      Results called to and read back by:Ermelinda Anand, LEE-[facility];      06/05/2023  03:28 CJD      STREPTOCOCCUS AGALACTIAE (GROUP B)  Beta-hemolytic streptococci are routinely susceptible to   penicillins,cephalosporins and carbapenems.      Blood Culture #1 **CANNOT BE ORDERED STAT** [[id number]] Collected: 06/04/23 0410    Order Status: Completed Specimen: Blood from Peripheral, Forearm, Left Updated: 06/05/23 0632     Blood Culture, Routine No Growth to date      No Growth to date    Rapid Organism ID by PCR (from Blood culture) [[id number]]  (Abnormal) Collected: 06/04/23 0611    Order Status: Completed Updated: 06/05/23 0421     Enterococcus faecalis Not Detected     Enterococcus faecium Not Detected     Listeria monocytogenes Not Detected     Staphylococcus spp. Not Detected     Staphylococcus aureus Not Detected     Staphylococcus epidermidis Not Detected     Staphylococcus lugdunensis Not Detected      Streptococcus species See species for ID     Streptococcus agalactiae Detected     Streptococcus pneumoniae Not Detected     Streptococcus pyogenes Not Detected     Acinetobacter calcoaceticus/baumannii complex Not Detected     Bacteroides fragilis Not Detected     Enterobacterales Not Detected     Enterobacter cloacae complex Not Detected     Escherichia coli Not Detected     Klebsiella aerogenes Not Detected     Klebsiella oxytoca Not Detected     Klebsiella pneumoniae group Not Detected     Proteus Not Detected     Salmonella sp Not Detected     Serratia marcescens Not Detected     Haemophilus influenzae Not Detected     Neisseria meningtidis Not Detected     Pseudomonas aeruginosa Not Detected     Stenotrophomonas maltophilia Not Detected     Candida albicans Not Detected     Candida auris Not Detected     Candida glabrata Not Detected     Candida krusei Not Detected     Candida parapsilosis Not Detected     Candida tropicalis Not Detected     Cryptococcus neoformans/gattii Not Detected     CTX-M (ESBL ) Test not applicable     IMP (Carbapenem resistant) Test not applicable     KPC resistance gene (Carbapenem resistant) Test not applicable     mcr-1  Test not applicable     mec A/C  Test not applicable     mec A/C and MREJ (MRSA) gene Test not applicable     NDM (Carbapenem resistant) Test not applicable     OXA-48-like (Carbapenem resistant) Test not applicable     van A/B (VRE gene) Test not applicable     VIM (Carbapenem resistant) Test not applicable            Impression:  Active Hospital Problems    Diagnosis  POA    Laryngeal cancer s/p surgery and chemo [C32.9]  Unknown    COPD exacerbation [J44.1]  Unknown    PEG tube  [K94.23]  Unknown    Acute hypercapnic respiratory failure [J96.02]  Unknown    On home oxygen therapy [Z99.81]  Not Applicable    Ex-smoker [Z87.891]  Not Applicable    Bronchiectasis [J47.9]  Unknown      Resolved Hospital Problems   No resolved problems to display.            Plan:   Acute on chronic hypercapneic respiratory failure with mechanical ventilation  - continue ventilatory support  - on propofol and fentanyl to sedate patient  - on Levophed secondary to propofol and fentanyl  - chest x-ray remains clear  - sedation vacation and CPAP trial with plans to extubate  COPD w/ acute exacerbation  Bronchiectasis w/ recurrent infections  - sputum growing Pseudomonas, likely a colonizer  - change Rocephin to cefepime  Bacteremia  - strep agalactiae  - cefepime will certainly cover the strep  Hx laryngeal cancer in remission  Chronic pain  - narcotic pain meds increased 1 month prior   Anemia  - worsening  Thrombocytosis  Hypokalemia  - replace electrolytes  Mild hypoalbuminemia  - enteral feeding ongoing      Critical care time spent reviewing the chart, examining the patient, reviewing the labs, reviewing the radiological findings, discussing care with nursing, physicians, and respiratory and creating the note and  has been greater than 35 minutes

## 2023-06-05 NOTE — PLAN OF CARE
Continue to advance TF to goal rate 55ml/hr. Tolerating TF at 30ml/hr.     Problem: Feeding Intolerance (Enteral Nutrition)  Goal: Feeding Tolerance  Outcome: Ongoing, Progressing

## 2023-06-05 NOTE — NURSING
When changing pt after BM, observed 2 dime sized stage 1 tears.  Mepilex applied and area cleansed with soap and water.  Photo attempted but ad was unable to connect to the Internet.  2 nurses tried.  Logged in Ch[employer] and wc consulted.

## 2023-06-05 NOTE — HOSPITAL COURSE
06/05  Pt got extubated  Started on iv abx for bacteremia  Started on TF s    06/06  No new issues  Restarted on some home medications    06/07  Transferred out of ICU  Can go home once Home iv abx has been arranged  Pts wife not interested in HH

## 2023-06-05 NOTE — CARE UPDATE
06/04/23 1954   Patient Assessment/Suction   Level of Consciousness (AVPU) responds to pain   Respiratory Effort Unlabored   Expansion/Accessory Muscles/Retractions expansion symmetric   All Lung Fields Breath Sounds coarse   Rhythm/Pattern, Respiratory assisted mechanically   Cough Frequency with stimulation   Cough Type assisted   Suction Method tracheal   $ Suction Charges Inline Suction Procedure Stat Charge   Secretions Amount small   Secretions Color white;yellow   Secretions Characteristics thick   PRE-TX-O2   Device (Oxygen Therapy) ventilator   $ Is the patient on Low Flow Oxygen? Yes   Oxygen Concentration (%) 35   SpO2 100 %   Pulse Oximetry Type Continuous   $ Pulse Oximetry - Multiple Charge Pulse Oximetry - Multiple   Pulse 83   Resp (!) 28   Aerosol Therapy   $ Aerosol Therapy Charges Aerosol Treatment   Daily Review of Necessity (SVN) completed   Respiratory Treatment Status (SVN) given   Treatment Route (SVN) in-line   Patient Position (SVN) HOB elevated   Post Treatment Assessment (SVN) breath sounds unchanged   Signs of Intolerance (SVN) none   Breath Sounds Post-Respiratory Treatment   Throughout All Fields Post-Treatment All Fields   Throughout All Fields Post-Treatment no change   Post-treatment Heart Rate (beats/min) 83   Post-treatment Resp Rate (breaths/min) 28        Airway - Non-Surgical 06/04/23 0851 Endotracheal Tube   Placement Date/Time: 06/04/23 0851   Method of Intubation: Glidescope  Inserted by: MD  Staff/Resident Name(s): MADELEINE Hoff crt  Airway Device: Endotracheal Tube  Mask Ventilation: Easy  Blade: Glidescope #3  Airway Device Size: 7.5  Style: Cu...   Secured at 24 cm   Measured At Lips   Secured Location Center   Secured by Commercial tube mckinnon   Site Condition Cool;Dry   Status Intact;Secured;Patent   Site Assessment Clean;Dry;No bleeding   Cuff Volume 22 mL   Vent Select   Charged w/in last 24h YES   Preset Conventional Ventilator Settings   Vent ID [id number]   Vent  Type    Ventilation Type VC   Vent Mode A/C   Humidity HME   Set Rate 28 BPM   Vt Set 370 mL   PEEP/CPAP 5 cmH20   Peak Flow 50 L/min   Peak End Inspiratory Pressure 22 cmH20   I-Trigger Type  V-TRIG   Trigger Sensitivity Flow/I-Trigger 3 L/min   Patient Ventilator Parameters   Resp Rate Total 28 br/min   Peak Airway Pressure 28 cmH20   Mean Airway Pressure 13 cmH20   Plateau Pressure 0 cmH20   Exhaled Vt 374 mL   Total Ve 10.5 L/m   I:E Ratio Measured 1:1.70   Auto PEEP 0 cmH20   Conventional Ventilator Alarms   Alarms On Y   Resp Rate High Alarm 40 br/min   Press High Alarm 50 cmH2O   Apnea Rate 10   Apnea Volume (mL) 0 mL   Apnea Oxygen Concentration  100   Apnea Flow Rate (L/min) 54   T Apnea 20 sec(s)   Ready to Wean/Extubation Screen   FIO2<=50 (chart decimal) 0.35   MV<16L (chart vol.) 10.5   PEEP <=8 (chart #) 5   Ready to Wean Parameters   F/VT Ratio<105 (RSBI) (!) 74.87   Vital Capacity   Vital Capacity (mL) 0   Education   $ Education Ventilator Oxygen

## 2023-06-05 NOTE — SUBJECTIVE & OBJECTIVE
Interval History:     Review of Systems   Constitutional:  Positive for fatigue. Negative for activity change and appetite change.   HENT:  Negative for congestion and dental problem.    Eyes:  Negative for discharge and itching.   Respiratory:  Negative for shortness of breath.    Cardiovascular:  Negative for chest pain.   Gastrointestinal:  Negative for abdominal distention and abdominal pain.   Endocrine: Negative for cold intolerance.   Genitourinary:  Negative for difficulty urinating and dysuria.   Musculoskeletal:  Positive for arthralgias and back pain.   Skin:  Negative for color change.   Neurological:  Negative for dizziness and facial asymmetry.   Hematological:  Negative for adenopathy.   Psychiatric/Behavioral:  Negative for agitation and behavioral problems.    Objective:     Vital Signs (Most Recent):  Temp: 98.4 °F (36.9 °C) (06/05/23 1701)  Pulse: 101 (06/05/23 1701)  Resp: 16 (06/05/23 1525)  BP: 127/78 (06/05/23 1701)  SpO2: 98 % (06/05/23 1701) Vital Signs (24h Range):  Temp:  [98.2 °F (36.8 °C)-98.5 °F (36.9 °C)] 98.4 °F (36.9 °C)  Pulse:  [] 101  Resp:  [16-28] 16  SpO2:  [92 %-100 %] 98 %  BP: (109-133)/(60-78) 127/78  Arterial Line BP: (112-149)/(54-69) 121/55     Weight: 57.6 kg (126 lb 15.8 oz)  Body mass index is 21.13 kg/m².    Intake/Output Summary (Last 24 hours) at 6/5/2023 1739  Last data filed at 6/5/2023 1702  Gross per 24 hour   Intake 1918.82 ml   Output 2050 ml   Net -131.18 ml         Physical Exam  Vitals and nursing note reviewed.   Constitutional:       Appearance: He is well-developed.      Comments: Baseline    HENT:      Head: Atraumatic.      Right Ear: External ear normal.      Left Ear: External ear normal.      Nose: Nose normal.      Mouth/Throat:      Mouth: Mucous membranes are moist.   Cardiovascular:      Rate and Rhythm: Normal rate.   Pulmonary:      Effort: Pulmonary effort is normal.   Abdominal:      Palpations: Abdomen is soft.   Musculoskeletal:          General: Normal range of motion.      Cervical back: Full passive range of motion without pain and normal range of motion.   Skin:     General: Skin is dry.   Neurological:      Mental Status: Mental status is at baseline.   Psychiatric:         Behavior: Behavior normal.           Significant Labs: All pertinent labs within the past 24 hours have been reviewed.  CBC:   Recent Labs   Lab 06/04/23 0226 06/04/23  0250 06/05/23  0356 06/05/23  0841 06/05/23  1145   WBC 13.49*  --  12.02  --   --    HGB 12.9*  --  9.8*  --   --    HCT 45.9   < > 33.1* 30* 32*   *  --  526*  --   --     < > = values in this interval not displayed.     CMP:   Recent Labs   Lab 06/04/23 0226 06/05/23  0356    144   K 4.6 3.4*   CL 98 106   CO2 36* 32*   * 188*   BUN 24* 19   CREATININE 0.7 0.6   CALCIUM 9.2 8.1*   PROT 8.2  --    ALBUMIN 3.3*  --    BILITOT 0.4  --    ALKPHOS 85  --    AST 25  --    ALT 46*  --    ANIONGAP 11 6*       Significant Imaging: I have reviewed all pertinent imaging results/findings within the past 24 hours.

## 2023-06-05 NOTE — PROGRESS NOTES
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  He was diagnosed with tonsillar cancer 4 years ago and completed the treatment and following up with the Oncology and currently in remission.  He is ex-smoker about 4 years and stopped after he got diagnosis of cancer.  This evening he started having shortness of breath and wheezing and brought to the emergency room.  ABG was done in the emergency room with hypercapnia and started BiPAP.  Later CT chest was done no pulmonary embolism and no infiltrate and chronic changes.  There is no  record in this hospital and patient always follow-up with [facility].  On examination patient is on   BiPAP and history mostly from wife.  Patient needed PEG tube feeding.  Lung exam with limited air entry.  ABG with a respiratory acidosis and will continue BiPAP.      Overview/Hospital Course:  06/05  Pt got extubated  Started on iv abx for bacteremia  Started on TF s      Interval History:     Review of Systems   Constitutional:  Positive for fatigue. Negative for activity change and appetite change.   HENT:  Negative for congestion and dental problem.    Eyes:  Negative for discharge and itching.   Respiratory:  Negative for shortness of breath.    Cardiovascular:  Negative for chest pain.   Gastrointestinal:  Negative for abdominal distention and abdominal pain.   Endocrine: Negative for cold intolerance.   Genitourinary:  Negative for difficulty urinating  and dysuria.   Musculoskeletal:  Positive for arthralgias and back pain.   Skin:  Negative for color change.   Neurological:  Negative for dizziness and facial asymmetry.   Hematological:  Negative for adenopathy.   Psychiatric/Behavioral:  Negative for agitation and behavioral problems.    Objective:     Vital Signs (Most Recent):  Temp: 98.4 °F (36.9 °C) (06/05/23 1701)  Pulse: 101 (06/05/23 1701)  Resp: 16 (06/05/23 1525)  BP: 127/78 (06/05/23 1701)  SpO2: 98 % (06/05/23 1701) Vital Signs (24h Range):  Temp:  [98.2 °F (36.8 °C)-98.5 °F (36.9 °C)] 98.4 °F (36.9 °C)  Pulse:  [] 101  Resp:  [16-28] 16  SpO2:  [92 %-100 %] 98 %  BP: (109-133)/(60-78) 127/78  Arterial Line BP: (112-149)/(54-69) 121/55     Weight: 57.6 kg (126 lb 15.8 oz)  Body mass index is 21.13 kg/m².    Intake/Output Summary (Last 24 hours) at 6/5/2023 1739  Last data filed at 6/5/2023 1702  Gross per 24 hour   Intake 1918.82 ml   Output 2050 ml   Net -131.18 ml         Physical Exam  Vitals and nursing note reviewed.   Constitutional:       Appearance: He is well-developed.      Comments: Baseline    HENT:      Head: Atraumatic.      Right Ear: External ear normal.      Left Ear: External ear normal.      Nose: Nose normal.      Mouth/Throat:      Mouth: Mucous membranes are moist.   Cardiovascular:      Rate and Rhythm: Normal rate.   Pulmonary:      Effort: Pulmonary effort is normal.   Abdominal:      Palpations: Abdomen is soft.   Musculoskeletal:         General: Normal range of motion.      Cervical back: Full passive range of motion without pain and normal range of motion.   Skin:     General: Skin is dry.   Neurological:      Mental Status: Mental status is at baseline.   Psychiatric:         Behavior: Behavior normal.           Significant Labs: All pertinent labs within the past 24 hours have been reviewed.  CBC:   Recent Labs   Lab 06/04/23  0226 06/04/23  0250 06/05/23  0356 06/05/23  0841 06/05/23  1145   WBC 13.49*  --  12.02   --   --    HGB 12.9*  --  9.8*  --   --    HCT 45.9   < > 33.1* 30* 32*   *  --  526*  --   --     < > = values in this interval not displayed.     CMP:   Recent Labs   Lab 06/04/23  0226 06/05/23  0356    144   K 4.6 3.4*   CL 98 106   CO2 36* 32*   * 188*   BUN 24* 19   CREATININE 0.7 0.6   CALCIUM 9.2 8.1*   PROT 8.2  --    ALBUMIN 3.3*  --    BILITOT 0.4  --    ALKPHOS 85  --    AST 25  --    ALT 46*  --    ANIONGAP 11 6*       Significant Imaging: I have reviewed all pertinent imaging results/findings within the past 24 hours.      Assessment/Plan:      * Acute hypercapnic respiratory failure  Acute on chronic hypercapnic respiratory failure  Was intubated and now extubated     COPD exacerbation  Maintain present Rx      Shock, unspecified  Multifactorial: from bacteremia and propofol  Was on pressors for short period of time       Bacteremia  Started on iv cefepime for strep agalactiae in blood cultures      Bronchiectasis  Aware       Ex-smoker  Aware       On home oxygen therapy  Aware       PEG tube   Started on TFs      Laryngeal cancer s/p surgery and chemo  Aware         VTE Risk Mitigation (From admission, onward)         Ordered     enoxaparin injection 40 mg  Every 24 hours         06/04/23 1504     IP VTE HIGH RISK PATIENT  Once         06/04/23 0525     Place sequential compression device  Until discontinued         06/04/23 0525                Discharge Planning   JENNA: 6/9/2023     Code Status: Full Code   Is the patient medically ready for discharge?:     Reason for patient still in hospital (select all that apply): Treatment  Discharge Plan A: Home with family                  Pradeep Myles MD  Department of Hospital Medicine   Formerly [facility]

## 2023-06-05 NOTE — CONSULTS
Peg site red and oozing feeding.  Notified Dr. Myles.  Cleaned and applied thin layer of triad        Bilateral buttock stage 2 on right and left buttock, 0.3x0.3x0.1cm on right buttock and 0.3x0.2x0.1cm on left pink wound bed peeling, notified dr myles orders received.

## 2023-06-05 NOTE — PLAN OF CARE
06/05/23 0736   Patient Assessment/Suction   Level of Consciousness (AVPU) responds to voice   Respiratory Effort Normal;Unlabored   Expansion/Accessory Muscles/Retractions no use of accessory muscles;no retractions   All Lung Fields Breath Sounds coarse   Rhythm/Pattern, Respiratory assisted mechanically;unlabored;pattern regular;depth regular   Cough Frequency with stimulation   Cough Type assisted   Suction Method tracheal   $ Suction Charges Inline Suction Procedure Stat Charge   Secretions Amount moderate   Secretions Color yellow   Secretions Characteristics thick   PRE-TX-O2   Device (Oxygen Therapy) ventilator   $ Is the patient on Low Flow Oxygen? Yes   Oxygen Concentration (%) 30   SpO2 (!) 92 %   Pulse Oximetry Type Continuous   $ Pulse Oximetry - Multiple Charge Pulse Oximetry - Multiple   Pulse 91   Resp (!) 24   Aerosol Therapy   $ Aerosol Therapy Charges Aerosol Treatment   Daily Review of Necessity (SVN) completed   Respiratory Treatment Status (SVN) given   Treatment Route (SVN) in-line   Patient Position (SVN) semi-Giraldo's   Post Treatment Assessment (SVN) breath sounds unchanged   Signs of Intolerance (SVN) none   Breath Sounds Post-Respiratory Treatment   Post-treatment Heart Rate (beats/min) 93   Post-treatment Resp Rate (breaths/min) 24        Airway - Non-Surgical 06/04/23 0851 Endotracheal Tube   Placement Date/Time: 06/04/23 0851   Method of Intubation: Glidescope  Inserted by: MD  Staff/Resident Name(s): MADELEINE Hoff crt  Airway Device: Endotracheal Tube  Mask Ventilation: Easy  Blade: Glidescope #3  Airway Device Size: 7.5  Style: Cu...   Secured at 24 cm   Measured At Lips   Secured Location Center   Secured by Commercial tube mckinnon   Bite Block none   Site Condition Cool;Dry   Status Intact;Secured;Patent   Site Assessment Clean;Dry   Vent Select   Conventional Vent Y   $ Ventilator Subsequent 1   Charged w/in last 24h YES   Preset Conventional Ventilator Settings   Vent ID [id number]    Vent Type    Ventilation Type VC   Vent Mode A/C   Humidity HME   Set Rate 20 BPM   Vt Set 370 mL   PEEP/CPAP 5 cmH20   Peak Flow 50 L/min   Peak End Inspiratory Pressure 3.9 cmH20   I-Trigger Type  V-TRIG   Trigger Sensitivity Flow/I-Trigger 3 L/min   Patient Ventilator Parameters   Resp Rate Total 21 br/min   Peak Airway Pressure 23 cmH20   Mean Airway Pressure 7.7 cmH20   Plateau Pressure 0 cmH20   Exhaled Vt 381 mL   Total Ve 8.06 L/m   I:E Ratio Measured 1:2.80   Auto PEEP 0 cmH20   Conventional Ventilator Alarms   Alarms On Y   Resp Rate High Alarm 40 br/min   Press High Alarm 50 cmH2O   Apnea Rate 10   Apnea Volume (mL) 0 mL   Apnea Oxygen Concentration  100   Apnea Flow Rate (L/min) 54   T Apnea 20 sec(s)   Ready to Wean/Extubation Screen   FIO2<=50 (chart decimal) 0.3   MV<16L (chart vol.) 8.06   PEEP <=8 (chart #) 5   Education   $ Education Bronchodilator;15 min

## 2023-06-06 LAB
ANION GAP SERPL CALC-SCNC: 6 MMOL/L (ref 8–16)
BACTERIA BLD CULT: ABNORMAL
BACTERIA SPEC AEROBE CULT: ABNORMAL
BASOPHILS # BLD AUTO: 0.01 K/UL (ref 0–0.2)
BASOPHILS NFR BLD: 0.1 % (ref 0–1.9)
BUN SERPL-MCNC: 17 MG/DL (ref 8–23)
CALCIUM SERPL-MCNC: 8.6 MG/DL (ref 8.7–10.5)
CHLORIDE SERPL-SCNC: 105 MMOL/L (ref 95–110)
CO2 SERPL-SCNC: 32 MMOL/L (ref 23–29)
CREAT SERPL-MCNC: 0.6 MG/DL (ref 0.5–1.4)
DIFFERENTIAL METHOD: ABNORMAL
EOSINOPHIL # BLD AUTO: 0 K/UL (ref 0–0.5)
EOSINOPHIL NFR BLD: 0 % (ref 0–8)
ERYTHROCYTE [DISTWIDTH] IN BLOOD BY AUTOMATED COUNT: 15.1 % (ref 11.5–14.5)
EST. GFR  (NO RACE VARIABLE): >60 ML/MIN/1.73 M^2
GLUCOSE SERPL-MCNC: 138 MG/DL (ref 70–110)
GRAM STN SPEC: ABNORMAL
HCT VFR BLD AUTO: 35.2 % (ref 40–54)
HGB BLD-MCNC: 10.2 G/DL (ref 14–18)
IMM GRANULOCYTES # BLD AUTO: 0.11 K/UL (ref 0–0.04)
IMM GRANULOCYTES NFR BLD AUTO: 0.8 % (ref 0–0.5)
LYMPHOCYTES # BLD AUTO: 0.3 K/UL (ref 1–4.8)
LYMPHOCYTES NFR BLD: 2 % (ref 18–48)
MCH RBC QN AUTO: 26.8 PG (ref 27–31)
MCHC RBC AUTO-ENTMCNC: 29 G/DL (ref 32–36)
MCV RBC AUTO: 92 FL (ref 82–98)
MONOCYTES # BLD AUTO: 0.1 K/UL (ref 0.3–1)
MONOCYTES NFR BLD: 0.7 % (ref 4–15)
NEUTROPHILS # BLD AUTO: 13.8 K/UL (ref 1.8–7.7)
NEUTROPHILS NFR BLD: 96.4 % (ref 38–73)
NRBC BLD-RTO: 0 /100 WBC
PLATELET # BLD AUTO: 517 K/UL (ref 150–450)
PMV BLD AUTO: 10.6 FL (ref 9.2–12.9)
POTASSIUM SERPL-SCNC: 4.6 MMOL/L (ref 3.5–5.1)
RBC # BLD AUTO: 3.81 M/UL (ref 4.6–6.2)
SODIUM SERPL-SCNC: 143 MMOL/L (ref 136–145)
WBC # BLD AUTO: 14.25 K/UL (ref 3.9–12.7)

## 2023-06-06 PROCEDURE — 94760 N-INVAS EAR/PLS OXIMETRY 1: CPT

## 2023-06-06 PROCEDURE — 94761 N-INVAS EAR/PLS OXIMETRY MLT: CPT

## 2023-06-06 PROCEDURE — C9113 INJ PANTOPRAZOLE SODIUM, VIA: HCPCS | Performed by: INTERNAL MEDICINE

## 2023-06-06 PROCEDURE — 99900035 HC TECH TIME PER 15 MIN (STAT)

## 2023-06-06 PROCEDURE — 63600175 PHARM REV CODE 636 W HCPCS: Performed by: INTERNAL MEDICINE

## 2023-06-06 PROCEDURE — 94640 AIRWAY INHALATION TREATMENT: CPT

## 2023-06-06 PROCEDURE — 25000003 PHARM REV CODE 250: Performed by: INTERNAL MEDICINE

## 2023-06-06 PROCEDURE — 99900031 HC PATIENT EDUCATION (STAT)

## 2023-06-06 PROCEDURE — 99233 SBSQ HOSP IP/OBS HIGH 50: CPT | Mod: ,,, | Performed by: INTERNAL MEDICINE

## 2023-06-06 PROCEDURE — 80048 BASIC METABOLIC PNL TOTAL CA: CPT | Performed by: INTERNAL MEDICINE

## 2023-06-06 PROCEDURE — 27000221 HC OXYGEN, UP TO 24 HOURS

## 2023-06-06 PROCEDURE — 97161 PT EVAL LOW COMPLEX 20 MIN: CPT

## 2023-06-06 PROCEDURE — 25000242 PHARM REV CODE 250 ALT 637 W/ HCPCS: Performed by: INTERNAL MEDICINE

## 2023-06-06 PROCEDURE — 97530 THERAPEUTIC ACTIVITIES: CPT

## 2023-06-06 PROCEDURE — 94799 UNLISTED PULMONARY SVC/PX: CPT

## 2023-06-06 PROCEDURE — 99233 PR SUBSEQUENT HOSPITAL CARE,LEVL III: ICD-10-PCS | Mod: ,,, | Performed by: INTERNAL MEDICINE

## 2023-06-06 PROCEDURE — 20000000 HC ICU ROOM

## 2023-06-06 PROCEDURE — 85025 COMPLETE CBC W/AUTO DIFF WBC: CPT | Performed by: INTERNAL MEDICINE

## 2023-06-06 RX ORDER — PREDNISONE 20 MG/1
40 TABLET ORAL DAILY
Status: DISCONTINUED | OUTPATIENT
Start: 2023-06-07 | End: 2023-06-08 | Stop reason: HOSPADM

## 2023-06-06 RX ORDER — LORAZEPAM 2 MG/ML
0.5 INJECTION INTRAMUSCULAR EVERY 6 HOURS PRN
Status: DISCONTINUED | OUTPATIENT
Start: 2023-06-06 | End: 2023-06-08 | Stop reason: HOSPADM

## 2023-06-06 RX ORDER — AMLODIPINE BESYLATE 5 MG/1
5 TABLET ORAL DAILY
Status: DISCONTINUED | OUTPATIENT
Start: 2023-06-06 | End: 2023-06-08 | Stop reason: HOSPADM

## 2023-06-06 RX ORDER — FLUTICASONE PROPIONATE 50 MCG
2 SPRAY, SUSPENSION (ML) NASAL DAILY
Status: DISCONTINUED | OUTPATIENT
Start: 2023-06-07 | End: 2023-06-06

## 2023-06-06 RX ORDER — AMITRIPTYLINE HYDROCHLORIDE 25 MG/1
50 TABLET, FILM COATED ORAL NIGHTLY
Status: DISCONTINUED | OUTPATIENT
Start: 2023-06-06 | End: 2023-06-08 | Stop reason: HOSPADM

## 2023-06-06 RX ORDER — OXYCODONE AND ACETAMINOPHEN 10; 325 MG/1; MG/1
1 TABLET ORAL EVERY 4 HOURS PRN
Status: DISCONTINUED | OUTPATIENT
Start: 2023-06-06 | End: 2023-06-08 | Stop reason: HOSPADM

## 2023-06-06 RX ORDER — FLUTICASONE PROPIONATE 50 MCG
2 SPRAY, SUSPENSION (ML) NASAL DAILY
Status: DISCONTINUED | OUTPATIENT
Start: 2023-06-06 | End: 2023-06-08 | Stop reason: HOSPADM

## 2023-06-06 RX ADMIN — CHLORHEXIDINE GLUCONATE 15 ML: 1.2 RINSE ORAL at 08:06

## 2023-06-06 RX ADMIN — FLUTICASONE PROPIONATE 100 MCG: 50 SPRAY, METERED NASAL at 06:06

## 2023-06-06 RX ADMIN — IPRATROPIUM BROMIDE AND ALBUTEROL SULFATE 3 ML: 2.5; .5 SOLUTION RESPIRATORY (INHALATION) at 12:06

## 2023-06-06 RX ADMIN — AMLODIPINE BESYLATE 5 MG: 5 TABLET ORAL at 01:06

## 2023-06-06 RX ADMIN — IPRATROPIUM BROMIDE AND ALBUTEROL SULFATE 3 ML: 2.5; .5 SOLUTION RESPIRATORY (INHALATION) at 04:06

## 2023-06-06 RX ADMIN — CEFEPIME 1 G: 1 INJECTION, POWDER, FOR SOLUTION INTRAMUSCULAR; INTRAVENOUS at 11:06

## 2023-06-06 RX ADMIN — IPRATROPIUM BROMIDE AND ALBUTEROL SULFATE 3 ML: 2.5; .5 SOLUTION RESPIRATORY (INHALATION) at 08:06

## 2023-06-06 RX ADMIN — CEFEPIME 1 G: 1 INJECTION, POWDER, FOR SOLUTION INTRAMUSCULAR; INTRAVENOUS at 03:06

## 2023-06-06 RX ADMIN — CHLORHEXIDINE GLUCONATE 15 ML: 1.2 RINSE ORAL at 09:06

## 2023-06-06 RX ADMIN — PANTOPRAZOLE SODIUM 40 MG: 40 INJECTION, POWDER, FOR SOLUTION INTRAVENOUS at 09:06

## 2023-06-06 RX ADMIN — AMITRIPTYLINE HYDROCHLORIDE 50 MG: 25 TABLET, FILM COATED ORAL at 08:06

## 2023-06-06 RX ADMIN — LEVOTHYROXINE SODIUM 125 MCG: 0.1 TABLET ORAL at 01:06

## 2023-06-06 RX ADMIN — LORAZEPAM 0.5 MG: 2 INJECTION INTRAMUSCULAR; INTRAVENOUS at 12:06

## 2023-06-06 RX ADMIN — METHYLPREDNISOLONE SODIUM SUCCINATE 80 MG: 40 INJECTION, POWDER, FOR SOLUTION INTRAMUSCULAR; INTRAVENOUS at 05:06

## 2023-06-06 RX ADMIN — OXYCODONE HYDROCHLORIDE AND ACETAMINOPHEN 1 TABLET: 10; 325 TABLET ORAL at 05:06

## 2023-06-06 RX ADMIN — CEFEPIME 1 G: 1 INJECTION, POWDER, FOR SOLUTION INTRAMUSCULAR; INTRAVENOUS at 07:06

## 2023-06-06 RX ADMIN — ENOXAPARIN SODIUM 40 MG: 100 INJECTION SUBCUTANEOUS at 05:06

## 2023-06-06 NOTE — PLAN OF CARE
Problem: Adult Inpatient Plan of Care  Goal: Plan of Care Review  Outcome: Ongoing, Progressing  Goal: Patient-Specific Goal (Individualized)  Outcome: Ongoing, Progressing  Goal: Optimal Comfort and Wellbeing  Outcome: Ongoing, Progressing  Goal: Readiness for Transition of Care  Outcome: Ongoing, Progressing     Problem: Restraint, Nonbehavioral (Nonviolent)  Goal: Absence of Harm or Injury  Outcome: Ongoing, Progressing     Problem: Skin Injury Risk Increased  Goal: Skin Health and Integrity  Outcome: Ongoing, Progressing     Problem: Inability to Wean (Mechanical Ventilation, Invasive)  Goal: Mechanical Ventilation Liberation  Outcome: Ongoing, Progressing

## 2023-06-06 NOTE — CARE UPDATE
06/06/23 0842   Patient Assessment/Suction   Level of Consciousness (AVPU) alert   Respiratory Effort Unlabored   Expansion/Accessory Muscles/Retractions expansion symmetric   Rhythm/Pattern, Respiratory pattern regular   PRE-TX-O2   Device (Oxygen Therapy) nasal cannula   $ Is the patient on Low Flow Oxygen? Yes   Flow (L/min) 3   SpO2 99 %   Pulse Oximetry Type Continuous   $ Pulse Oximetry - Multiple Charge Pulse Oximetry - Multiple   Pulse 106   Resp (!) 30   Aerosol Therapy   $ Aerosol Therapy Charges Aerosol Treatment   Daily Review of Necessity (SVN) completed   Respiratory Treatment Status (SVN) given   Treatment Route (SVN) mask   Patient Position (SVN) semi-Giraldo's   Post Treatment Assessment (SVN) breath sounds unchanged   Signs of Intolerance (SVN) none   Breath Sounds Post-Respiratory Treatment   Throughout All Fields Post-Treatment All Fields   Throughout All Fields Post-Treatment aeration increased   Post-treatment Heart Rate (beats/min) 106   Post-treatment Resp Rate (breaths/min) 28   Education   $ Education Bronchodilator;15 min   Respiratory Evaluation   $ Care Plan Tech Time 15 min   $ Eval/Re-eval Charges Re-evaluation

## 2023-06-06 NOTE — PROGRESS NOTES
Pulmonary/Critical Care   progress note      06/06/2023      Admit Date: 6/4/2023  Reji Romano      Chief Complaint   Patient presents with    Shortness of Breath       History of Present Illness:  Pt is a 67 yo male with COPD, chronic bronchiectasis, chronic resp failure on home O2, laryngeal ca s/p treatment and reconstruction (6yr ago), dysphagia s/p PEG who presented to the ED due to confusion and respiratory failure. He required bipap in the ED then failed bipap and was intubated. His wife provides hx. She he is patient was more lethargic 2 days and then episode of urinary incontinence. Last night he was excessively confused and disoriented so she called EMS.  She states he does have chronic shortness breath and chronic mucopurulent cough. He uses percussive vest, nebulizer, home O2 2L, daliresp, azithromycin, bactrim and Jc nebs off and on. He follows at [facility] for pulmonary care. RT suctioned copious tan colored material out of his airway. Pt has chronic pain of neck and legs, takes oxycodone. Wife states his dose was just doubled about 1 month ago. He gets tube feeds via peg and nothing by mouth.    6/5 the patient's chest x-ray remains clear.  He is growing a Pseudomonas out of his sputum.  His wife does not know what is normal organism is.  She gives me his pulmonologist's name at [facility], Dr. Diaz, [phone]. I have left repeated messages for him.  His ventilatory requirements are small.  A CPAP trial was not done this morning.  Will proceed with 1 at this time with hopes to extubate.  The wife states that he is only on 2 L nasal cannula and moves very little because was told he could not have more oxygen because it was dangerous for him.  She states he desaturates when he walks even a short distance.  She states he is compliant with his vest.  The patient has Daliresp and azithromycin 3 times a week.  He has had JC in the past making me think that this Pseudomonas is simply a regular colonizer  "for him    6/6 the patient was extubated yesterday without difficulty.  He continues to expectorate purulent mucus.  He is on 3 L nasal cannula with sats of 99%.  We are awaiting transfer out of ICU.  I am still trying to reach his pulmonologist.    No change in the patient's Past Medical History, Past Surgical History, Social History or Family History since admission.    Review of Systems:   Unobtainable      Exam:Comprehensive exam done. /75   Pulse 108   Temp 97.8 °F (36.6 °C) (Oral)   Resp (!) 40   Ht 5' 5" (1.651 m)   Wt 57.6 kg (126 lb 15.8 oz)   SpO2 100%   BMI 21.13 kg/m²       PHYSICAL EXAM  GENERAL: Older patient in no distress, intubated on the ventilator  HEENT: Pupils equal and reactive. Extraocular movements intact. Nose intact. Pharynx moist, difficult to understand his speech  NECK: Supple.   HEART: Regular rate and rhythm. No murmur or gallop auscultated.  LUNGS:  Coarse breath sounds bilaterally.  Lung excursion symmetrical. No change in fremitus. No adventitial noises.  ABDOMEN:  Peg tube in place.  Bowel sounds present. Non-tender, no masses palpated.  : Normal anatomy.  Lazaro with yellow urine.  EXTREMITIES: Normal muscle tone and joint movement, no cyanosis or clubbing.   LYMPHATICS: No adenopathy palpated, no edema.  SKIN: Dry, intact, no lesions.   NEURO: Cranial nerves II-XII intact. Motor strength 5/5 bilaterally, upper and lower extremities.  PSYCH: Appropriate affect.    Radiographs reviewed: view by direct vision   CTA chest 6/4/23- no PE; mild diffuse bronchiectasis, emphysema, lung fields clear  Chest x-ray 6/5 clear      Labs     Sputum cx 3/29/230   ture, Respiratory with Smear  Moderate Growth Normal Oropharyngeal Anat    Culture, Respiratory with Smear  Moderate Growth Candida albicans Abnormal     Gram Stain  3+ Borderline (>25 WBC,>25 SQ EPI PER LPF)    Gram Stain  Normal Oropharyngeal Anat      sputum cx 1/31/23-   Component 4 mo ago   Culture, Respiratory with " Smear  Heavy Growth Pseudomonas aeruginosa Abnormal     Culture, Respiratory with Smear  Heavy Growth Streptococcus agalactiae Abnormal     Culture, Respiratory with Smear  Light Growth Normal Oropharyngeal Anat    Gram Stain  3+ Borderline (>25 WBC,>25 SQ EPI PER LPF)    Gram Stain  Normal Oropharyngeal Anat      Recent Labs   Lab 06/06/23  0305   WBC 14.25*   HGB 10.2*   HCT 35.2*   *     Recent Labs   Lab 06/06/23  0305      K 4.6      CO2 32*   BUN 17   CREATININE 0.6   *   CALCIUM 8.6*     Recent Labs   Lab 06/05/23  1253   PH 7.421   PCO2 54.9*   PO2 95   HCO3 35.7*     Microbiology Results (last 7 days)       Procedure Component Value Units Date/Time    Culture, Respiratory with Gram Stain [[id number]]  (Abnormal)  (Susceptibility) Collected: 06/04/23 0923    Order Status: Completed Specimen: Respiratory from Endotracheal Aspirate Updated: 06/06/23 0634     Respiratory Culture PSEUDOMONAS AERUGINOSA  Moderate      Blood Culture #1 **CANNOT BE ORDERED STAT** [[id number]] Collected: 06/04/23 0410    Order Status: Completed Specimen: Blood from Peripheral, Forearm, Left Updated: 06/06/23 0632     Blood Culture, Routine No Growth to date      No Growth to date      No Growth to date    Blood Culture #2 **CANNOT BE ORDERED STAT** [[id number]]  (Abnormal) Collected: 06/04/23 0611    Order Status: Completed Specimen: Blood from Peripheral, Forearm, Left Updated: 06/05/23 0718     Blood Culture, Routine Gram stain aer bottle: Gram positive cocci      Results called to and read back by:Ermelinda Anand RN-[id number];      06/05/2023  03:28 CJD      STREPTOCOCCUS AGALACTIAE (GROUP B)  Beta-hemolytic streptococci are routinely susceptible to   penicillins,cephalosporins and carbapenems.      Rapid Organism ID by PCR (from Blood culture) [[id number]]  (Abnormal) Collected: 06/04/23 0611    Order Status: Completed Updated: 06/05/23 0421     Enterococcus faecalis Not Detected     Enterococcus faecium Not  Detected     Listeria monocytogenes Not Detected     Staphylococcus spp. Not Detected     Staphylococcus aureus Not Detected     Staphylococcus epidermidis Not Detected     Staphylococcus lugdunensis Not Detected     Streptococcus species See species for ID     Streptococcus agalactiae Detected     Streptococcus pneumoniae Not Detected     Streptococcus pyogenes Not Detected     Acinetobacter calcoaceticus/baumannii complex Not Detected     Bacteroides fragilis Not Detected     Enterobacterales Not Detected     Enterobacter cloacae complex Not Detected     Escherichia coli Not Detected     Klebsiella aerogenes Not Detected     Klebsiella oxytoca Not Detected     Klebsiella pneumoniae group Not Detected     Proteus Not Detected     Salmonella sp Not Detected     Serratia marcescens Not Detected     Haemophilus influenzae Not Detected     Neisseria meningtidis Not Detected     Pseudomonas aeruginosa Not Detected     Stenotrophomonas maltophilia Not Detected     Candida albicans Not Detected     Candida auris Not Detected     Candida glabrata Not Detected     Candida krusei Not Detected     Candida parapsilosis Not Detected     Candida tropicalis Not Detected     Cryptococcus neoformans/gattii Not Detected     CTX-M (ESBL ) Test not applicable     IMP (Carbapenem resistant) Test not applicable     KPC resistance gene (Carbapenem resistant) Test not applicable     mcr-1  Test not applicable     mec A/C  Test not applicable     mec A/C and MREJ (MRSA) gene Test not applicable     NDM (Carbapenem resistant) Test not applicable     OXA-48-like (Carbapenem resistant) Test not applicable     van A/B (VRE gene) Test not applicable     VIM (Carbapenem resistant) Test not applicable            Impression:  Active Hospital Problems    Diagnosis  POA    *Acute hypercapnic respiratory failure [J96.02]  Unknown    Shock, unspecified [R57.9]  Unknown    Bacteremia [R78.81]  Unknown    Laryngeal cancer s/p surgery and chemo  [C32.9]  Unknown    COPD exacerbation [J44.1]  Unknown    PEG tube  [K94.23]  Unknown    On home oxygen therapy [Z99.81]  Not Applicable    Ex-smoker [Z87.891]  Not Applicable    Bronchiectasis [J47.9]  Unknown      Resolved Hospital Problems   No resolved problems to display.           Plan:   Acute on chronic hypercapneic respiratory failure with mechanical ventilation  - extubated yesterday  - chest x-ray remains clear  - discontinue arterial line and central line  COPD w/ acute exacerbation  - wean steroids  Bronchiectasis w/ recurrent infections  - sputum growing Pseudomonas, likely a colonizer  - change Rocephin to cefepime  - patient's pulmonologist states he is supposed to still be getting JC, month on,month off, Zithromax 3 times a week, Daliresp, and an unknown inhaler which he will look up and text to me.  - trying to reach wife to ask her to bring in his vest.  If this is unsuccessful will do CPT to the bases via Respiratory and IPV  Bacteremia  - strep agalactiae  - cefepime will certainly cover the strep  Hx laryngeal cancer in remission  Chronic pain  - narcotic pain meds increased 1 month prior   Anemia  - worsening  Thrombocytosis  Hypokalemia  - replace electrolytes  Mild hypoalbuminemia  - enteral feeding ongoing      Critical care time spent reviewing the chart, examining the patient, reviewing the labs, reviewing the radiological findings, discussing care with nursing, physicians, and respiratory and creating the note and  has been greater than 35 minutes

## 2023-06-06 NOTE — PLAN OF CARE
Problem: Physical Therapy  Goal: Physical Therapy Goal  Description: Goals to be met by: 23     Patient will increase functional independence with mobility by performin. Supine to sit with [place]  2. Sit to supine with [place]  3. Sit to stand transfer with [place]  4. Bed to chair transfer with Supervision using Rolling Walker  5. Gait  x 250 feet with Supervision using Rolling Walker.     Outcome: Ongoing, Progressing

## 2023-06-06 NOTE — PT/OT/SLP EVAL
Physical Therapy Evaluation    Patient Name:  Reji Romano   MRN:  [id number]    Recommendations:     Discharge Recommendations: home health PT, home   Discharge Equipment Recommendations: walker, rolling   Barriers to discharge: None    Assessment:     Reji Romano is a 68 y.o. male admitted with a medical diagnosis of Acute hypercapnic respiratory failure.  He presents with the following impairments/functional limitations: weakness, impaired endurance, impaired functional mobility, gait instability, impaired balance, decreased lower extremity function, pain, decreased ROM, impaired cardiopulmonary response to activity .  Patient agreeable to PT evaluation this afternoon.  Patient presented supine in bed and was able to transfer to sitting with sBA and stood with CGA.  Patient able to stand several minutes with RW and cGA to allow nursing to change bed sheets.  Patient then returned to supine with SBA.    Rehab Prognosis: Good; patient would benefit from acute skilled PT services to address these deficits and reach maximum level of function.    Recent Surgery: * No surgery found *      Plan:     During this hospitalization, patient to be seen 6 x/week to address the identified rehab impairments via gait training, therapeutic activities, therapeutic exercises and progress toward the following goals:    Plan of Care Expires:  07/04/23    Subjective     Chief Complaint: fatigue and SOB  Patient/Family Comments/goals: nurse  Pain/Comfort:  Pain Rating 1: 5/10  Location - Side 1: Bilateral  Location - Orientation 1: posterior  Location 1: cervical spine  Pain Addressed 1: Reposition, Cessation of Activity  Pain Rating Post-Intervention 1: 5/10    Patients cultural, spiritual, Buddhism conflicts given the current situation:      Living Environment:  Currently lives with spouse in a 1 story home with ramp to enter.  Prior to admission, patients level of function was independent.  Equipment used at home: none.  DME  owned (not currently used): none.  Upon discharge, patient will have assistance from family.    Objective:     Communicated with nurse prior to session.  Patient found supine with bed alarm, blood pressure cuff, arias catheter, peripheral IV, pulse ox (continuous), telemetry, PEG Tube  upon PT entry to room.    General Precautions: Standard, fall  Orthopedic Precautions:N/A   Braces:    Respiratory Status: Nasal cannula, flow 3 L/min    Exams:  RLE ROM: WFL  RLE Strength: Deficits: 4+/5 overall  LLE ROM: WFL  LLE Strength: Deficits: 4+/5 overall    Functional Mobility:  Bed Mobility:     Supine to Sit: stand by assistance  Sit to Supine: stand by assistance  Transfers:     Sit to Stand:  contact guard assistance with rolling walker  Balance: stand several minutes RW CGA      AM-PAC 6 CLICK MOBILITY  Total Score:18       Treatment & Education:  Transfer training supine to/from sit SBA, sit to stand RW CGA  Standing several minutes RW CGA    Patient left supine with all lines intact, call button in reach, bed alarm on, nurse notified, and spouse present.    GOALS:   Multidisciplinary Problems       Physical Therapy Goals          Problem: Physical Therapy    Goal Priority Disciplines Outcome Goal Variances Interventions   Physical Therapy Goal     PT, PT/OT Ongoing, Progressing     Description: Goals to be met by: 23     Patient will increase functional independence with mobility by performin. Supine to sit with [place]  2. Sit to supine with [place]  3. Sit to stand transfer with [place]  4. Bed to chair transfer with Supervision using Rolling Walker  5. Gait  x 250 feet with Supervision using Rolling Walker.                          History:     No past medical history on file.    No past surgical history on file.    Time Tracking:     PT Received On: 23  PT Start Time: 1425     PT Stop Time: 1450  PT Total Time (min): 25 min     Billable Minutes: Evaluation 15 and Therapeutic  Activity 10      06/06/2023

## 2023-06-06 NOTE — CARE UPDATE
06/05/23 1944   Patient Assessment/Suction   Level of Consciousness (AVPU) alert   Respiratory Effort Normal   Expansion/Accessory Muscles/Retractions no use of accessory muscles   All Lung Fields Breath Sounds diminished   Rhythm/Pattern, Respiratory unlabored   Cough Frequency frequent   Cough Type dry;nonproductive   PRE-TX-O2   Device (Oxygen Therapy) high flow nasal cannula   $ Is the patient on Low Flow Oxygen? Yes   Flow (L/min) 6   SpO2 99 %   Pulse Oximetry Type Continuous   $ Pulse Oximetry - Multiple Charge Pulse Oximetry - Multiple   Pulse 103   Resp 19   Aerosol Therapy   $ Aerosol Therapy Charges Aerosol Treatment   Daily Review of Necessity (SVN) completed   Respiratory Treatment Status (SVN) given   Treatment Route (SVN) mask   Patient Position (SVN) semi-Giraldo's   Post Treatment Assessment (SVN) breath sounds unchanged;vital signs unchanged   Signs of Intolerance (SVN) none   Education   $ Education Bronchodilator;15 min   Respiratory Evaluation   $ Care Plan Tech Time 15 min

## 2023-06-06 NOTE — PROGRESS NOTES
[facility]  Adult Nutrition   Progress Note (Nutrition Support Management)    SUMMARY      Recommendations:   1. Continue  Pulmocare @ 55 ml/hr. (1980 kcal, 83 g protein, 1036 ml H20) + 50 ml FWF q 4 hrs.   2. RD to continue to monitor for tolerance and make adjustments as needed.     Goals:   Goals: Tolerate TF at goal rate to meet EEN/EPN. Avoid weight loss. Progressing    Dietitian Rounds Brief  Patient extubated yesterday. Wound care note states pt had leak around tube. Today, TF infusing at goal rate; tolerated per nursing notes.  Last BM 6/05/23 x 2. RD to continue to follow.    Diet order: NPO    TF: Pulmocare  Rate: 55  Calories: 1980  Protein: 83  Fluid: 1036 mL  Water flushes: 300 mL    % Intake of Estimated Energy Needs: 75 - 100 %  % Meal Intake: NPO    Estimated/Assessed Needs  Weight Used For Calorie Calculations: 59 kg (130 lb 1.1 oz)  Energy Calorie Requirements (kcal): 1017-5208 kcal (25-30 kcal/ kg bw)     Protein Requirements: 89-118g (1.5-2.0g/ kg bw)  Weight Used For Protein Calculations: 59 kg (130 lb 1.1 oz)     Estimated Fluid Requirement Method: RDA Method  RDA Method (mL): 1475       Weight History:  Wt Readings from Last 5 Encounters:   06/04/23 57.6 kg (126 lb 15.8 oz)        Reason for Assessment  Reason For Assessment: consult  Relevant Medical History: Pt admitted with SOB. 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  Interdisciplinary Rounds: did not attend    Medications:Pertinent Medications Reviewed  Scheduled Meds:   albuterol-ipratropium  3 mL Nebulization Q4H    ceFEPime (MAXIPIME) IVPB  1 g Intravenous Q8H    chlorhexidine  15 mL Mouth/Throat BID    enoxparin  40 mg Subcutaneous Q24H (prophylaxis, 1700)    pantoprazole  40 mg Intravenous Daily    [START ON 6/7/2023]  predniSONE  40 mg Oral Daily     Continuous Infusions:  PRN Meds:.calcium gluconate IVPB, calcium gluconate IVPB, calcium gluconate IVPB, dextrose 50%, dextrose 50%, etomidate, glucagon (human recombinant), glucose, glucose, lorazepam, magnesium sulfate IVPB, magnesium sulfate IVPB, naloxone, potassium chloride **AND** potassium chloride **AND** potassium chloride, sodium chloride 0.9%, sodium phosphate IVPB, sodium phosphate IVPB, sodium phosphate IVPB    Labs: Pertinent Labs Reviewed  Clinical Chemistry:  Recent Labs   Lab 06/04/23 0226 06/06/23 0305    143   K 4.6 4.6   CL 98 105   CO2 36* 32*   * 138*   BUN 24* 17   CREATININE 0.7 0.6   CALCIUM 9.2 8.6*   PROT 8.2  --    ALBUMIN 3.3*  --    BILITOT 0.4  --    ALKPHOS 85  --    AST 25  --    ALT 46*  --    ANIONGAP 11 6*   MG 2.4  --     < > = values in this interval not displayed.     CBC:   Recent Labs   Lab 06/06/23 0305   WBC 14.25*   RBC 3.81*   HGB 10.2*   HCT 35.2*   *   MCV 92   MCH 26.8*   MCHC 29.0*     Cardiac Profile:  Recent Labs   Lab 06/04/23 0226   BNP 52     Monitor and Evaluation  Food and Nutrient Intake: enteral nutrition intake  Food and Nutrient Adminstration: enteral and parenteral nutrition administration     Nutrition Risk  Level of Risk/Frequency of Follow-up: high     Nutrition Follow-Up  RD Follow-up?: Yes    Lee Ann Lowe RD 06/06/2023 11:59 AM

## 2023-06-07 LAB
ANION GAP SERPL CALC-SCNC: 3 MMOL/L (ref 8–16)
BASOPHILS # BLD AUTO: 0 K/UL (ref 0–0.2)
BASOPHILS NFR BLD: 0 % (ref 0–1.9)
BUN SERPL-MCNC: 21 MG/DL (ref 8–23)
CALCIUM SERPL-MCNC: 8.4 MG/DL (ref 8.7–10.5)
CHLORIDE SERPL-SCNC: 105 MMOL/L (ref 95–110)
CO2 SERPL-SCNC: 33 MMOL/L (ref 23–29)
CREAT SERPL-MCNC: 0.5 MG/DL (ref 0.5–1.4)
DIFFERENTIAL METHOD: ABNORMAL
EOSINOPHIL # BLD AUTO: 0 K/UL (ref 0–0.5)
EOSINOPHIL NFR BLD: 0.1 % (ref 0–8)
ERYTHROCYTE [DISTWIDTH] IN BLOOD BY AUTOMATED COUNT: 15.1 % (ref 11.5–14.5)
EST. GFR  (NO RACE VARIABLE): >60 ML/MIN/1.73 M^2
GLUCOSE SERPL-MCNC: 98 MG/DL (ref 70–110)
HCT VFR BLD AUTO: 36.5 % (ref 40–54)
HGB BLD-MCNC: 10.7 G/DL (ref 14–18)
IMM GRANULOCYTES # BLD AUTO: 0.03 K/UL (ref 0–0.04)
IMM GRANULOCYTES NFR BLD AUTO: 0.4 % (ref 0–0.5)
LYMPHOCYTES # BLD AUTO: 0.6 K/UL (ref 1–4.8)
LYMPHOCYTES NFR BLD: 7.4 % (ref 18–48)
MCH RBC QN AUTO: 26.8 PG (ref 27–31)
MCHC RBC AUTO-ENTMCNC: 29.3 G/DL (ref 32–36)
MCV RBC AUTO: 91 FL (ref 82–98)
MONOCYTES # BLD AUTO: 0.6 K/UL (ref 0.3–1)
MONOCYTES NFR BLD: 7.8 % (ref 4–15)
NEUTROPHILS # BLD AUTO: 6.7 K/UL (ref 1.8–7.7)
NEUTROPHILS NFR BLD: 84.3 % (ref 38–73)
NRBC BLD-RTO: 0 /100 WBC
PLATELET # BLD AUTO: 486 K/UL (ref 150–450)
PMV BLD AUTO: 10.6 FL (ref 9.2–12.9)
POTASSIUM SERPL-SCNC: 3.7 MMOL/L (ref 3.5–5.1)
RBC # BLD AUTO: 4 M/UL (ref 4.6–6.2)
SODIUM SERPL-SCNC: 141 MMOL/L (ref 136–145)
WBC # BLD AUTO: 7.95 K/UL (ref 3.9–12.7)

## 2023-06-07 PROCEDURE — 97530 THERAPEUTIC ACTIVITIES: CPT

## 2023-06-07 PROCEDURE — 63600175 PHARM REV CODE 636 W HCPCS: Performed by: INTERNAL MEDICINE

## 2023-06-07 PROCEDURE — 94761 N-INVAS EAR/PLS OXIMETRY MLT: CPT

## 2023-06-07 PROCEDURE — 99233 PR SUBSEQUENT HOSPITAL CARE,LEVL III: ICD-10-PCS | Mod: ,,, | Performed by: INTERNAL MEDICINE

## 2023-06-07 PROCEDURE — 25000242 PHARM REV CODE 250 ALT 637 W/ HCPCS: Performed by: INTERNAL MEDICINE

## 2023-06-07 PROCEDURE — 94640 AIRWAY INHALATION TREATMENT: CPT

## 2023-06-07 PROCEDURE — 12000002 HC ACUTE/MED SURGE SEMI-PRIVATE ROOM

## 2023-06-07 PROCEDURE — 25000003 PHARM REV CODE 250: Performed by: INTERNAL MEDICINE

## 2023-06-07 PROCEDURE — 99233 SBSQ HOSP IP/OBS HIGH 50: CPT | Mod: ,,, | Performed by: INTERNAL MEDICINE

## 2023-06-07 PROCEDURE — 27000221 HC OXYGEN, UP TO 24 HOURS

## 2023-06-07 PROCEDURE — 80048 BASIC METABOLIC PNL TOTAL CA: CPT | Performed by: INTERNAL MEDICINE

## 2023-06-07 PROCEDURE — 99900026 HC AIRWAY MAINTENANCE (STAT)

## 2023-06-07 PROCEDURE — 99900031 HC PATIENT EDUCATION (STAT)

## 2023-06-07 PROCEDURE — 36415 COLL VENOUS BLD VENIPUNCTURE: CPT | Performed by: INTERNAL MEDICINE

## 2023-06-07 PROCEDURE — C9113 INJ PANTOPRAZOLE SODIUM, VIA: HCPCS | Performed by: INTERNAL MEDICINE

## 2023-06-07 PROCEDURE — 97166 OT EVAL MOD COMPLEX 45 MIN: CPT

## 2023-06-07 PROCEDURE — 99900035 HC TECH TIME PER 15 MIN (STAT)

## 2023-06-07 PROCEDURE — 97116 GAIT TRAINING THERAPY: CPT

## 2023-06-07 PROCEDURE — 85025 COMPLETE CBC W/AUTO DIFF WBC: CPT | Performed by: INTERNAL MEDICINE

## 2023-06-07 PROCEDURE — 94799 UNLISTED PULMONARY SVC/PX: CPT

## 2023-06-07 RX ORDER — FAMOTIDINE 20 MG/1
20 TABLET, FILM COATED ORAL 2 TIMES DAILY
Status: DISCONTINUED | OUTPATIENT
Start: 2023-06-07 | End: 2023-06-08 | Stop reason: HOSPADM

## 2023-06-07 RX ADMIN — LEVOTHYROXINE SODIUM 125 MCG: 0.1 TABLET ORAL at 05:06

## 2023-06-07 RX ADMIN — ENOXAPARIN SODIUM 40 MG: 100 INJECTION SUBCUTANEOUS at 04:06

## 2023-06-07 RX ADMIN — CHLORHEXIDINE GLUCONATE 15 ML: 1.2 RINSE ORAL at 09:06

## 2023-06-07 RX ADMIN — AMITRIPTYLINE HYDROCHLORIDE 50 MG: 25 TABLET, FILM COATED ORAL at 09:06

## 2023-06-07 RX ADMIN — FLUTICASONE PROPIONATE 100 MCG: 50 SPRAY, METERED NASAL at 08:06

## 2023-06-07 RX ADMIN — CHLORHEXIDINE GLUCONATE 15 ML: 1.2 RINSE ORAL at 08:06

## 2023-06-07 RX ADMIN — FAMOTIDINE 20 MG: 20 TABLET ORAL at 09:06

## 2023-06-07 RX ADMIN — PANTOPRAZOLE SODIUM 40 MG: 40 INJECTION, POWDER, FOR SOLUTION INTRAVENOUS at 08:06

## 2023-06-07 RX ADMIN — OXYCODONE HYDROCHLORIDE AND ACETAMINOPHEN 1 TABLET: 10; 325 TABLET ORAL at 08:06

## 2023-06-07 RX ADMIN — IPRATROPIUM BROMIDE AND ALBUTEROL SULFATE 3 ML: 2.5; .5 SOLUTION RESPIRATORY (INHALATION) at 04:06

## 2023-06-07 RX ADMIN — CEFEPIME 1 G: 1 INJECTION, POWDER, FOR SOLUTION INTRAMUSCULAR; INTRAVENOUS at 04:06

## 2023-06-07 RX ADMIN — IPRATROPIUM BROMIDE AND ALBUTEROL SULFATE 3 ML: 2.5; .5 SOLUTION RESPIRATORY (INHALATION) at 12:06

## 2023-06-07 RX ADMIN — OXYCODONE HYDROCHLORIDE AND ACETAMINOPHEN 1 TABLET: 10; 325 TABLET ORAL at 02:06

## 2023-06-07 RX ADMIN — PREDNISONE 40 MG: 20 TABLET ORAL at 08:06

## 2023-06-07 RX ADMIN — OXYCODONE HYDROCHLORIDE AND ACETAMINOPHEN 1 TABLET: 10; 325 TABLET ORAL at 07:06

## 2023-06-07 RX ADMIN — IPRATROPIUM BROMIDE AND ALBUTEROL SULFATE 3 ML: 2.5; .5 SOLUTION RESPIRATORY (INHALATION) at 08:06

## 2023-06-07 RX ADMIN — CEFEPIME 1 G: 1 INJECTION, POWDER, FOR SOLUTION INTRAMUSCULAR; INTRAVENOUS at 07:06

## 2023-06-07 RX ADMIN — AMLODIPINE BESYLATE 5 MG: 5 TABLET ORAL at 08:06

## 2023-06-07 RX ADMIN — CEFEPIME 1 G: 1 INJECTION, POWDER, FOR SOLUTION INTRAMUSCULAR; INTRAVENOUS at 12:06

## 2023-06-07 NOTE — PROGRESS NOTES
Pulmonary/Critical Care   progress note      06/07/2023      Admit Date: 6/4/2023  Reji Romano      Chief Complaint   Patient presents with    Shortness of Breath       History of Present Illness:  Pt is a 69 yo male with COPD, chronic bronchiectasis, chronic resp failure on home O2, laryngeal ca s/p treatment and reconstruction (6yr ago), dysphagia s/p PEG who presented to the ED due to confusion and respiratory failure. He required bipap in the ED then failed bipap and was intubated. His wife provides hx. She he is patient was more lethargic 2 days and then episode of urinary incontinence. Last night he was excessively confused and disoriented so she called EMS.  She states he does have chronic shortness breath and chronic mucopurulent cough. He uses percussive vest, nebulizer, home O2 2L, daliresp, azithromycin, bactrim and Jc nebs off and on. He follows at [facility] for pulmonary care. RT suctioned copious tan colored material out of his airway. Pt has chronic pain of neck and legs, takes oxycodone. Wife states his dose was just doubled about 1 month ago. He gets tube feeds via peg and nothing by mouth.    6/5 the patient's chest x-ray remains clear.  He is growing a Pseudomonas out of his sputum.  His wife does not know what is normal organism is.  She gives me his pulmonologist's name at [facility], Dr. Diaz, [phone]. I have left repeated messages for him.  His ventilatory requirements are small.  A CPAP trial was not done this morning.  Will proceed with 1 at this time with hopes to extubate.  The wife states that he is only on 2 L nasal cannula and moves very little because was told he could not have more oxygen because it was dangerous for him.  She states he desaturates when he walks even a short distance.  She states he is compliant with his vest.  The patient has Daliresp and azithromycin 3 times a week.  He has had JC in the past making me think that this Pseudomonas is simply a regular colonizer  "for him    6/6 the patient was extubated yesterday without difficulty.  He continues to expectorate purulent mucus.  He is on 3 L nasal cannula with sats of 99%.  We are awaiting transfer out of ICU.  I am still trying to reach his pulmonologist.     6/7 the patient looks much better.  He is up in the chair.  He is expectorating his sputum.  There is significantly less as he is receiving antibiotics.  He needs 7 days of IV antibiotics for his bacteremia.  But this can probably be Rocephin.  Discussed increasing his oxygen if needed while exercising.  I think we have communicated that this would be okay.  Patient states he is tolerating his enteral nutrition and having bowel movements.  Awaiting her med surg bed.    No change in the patient's Past Medical History, Past Surgical History, Social History or Family History since admission.    Review of Systems:   Unobtainable      Exam:Comprehensive exam done. /83   Pulse 100   Temp 96.6 °F (35.9 °C) (Axillary)   Resp (!) 25   Ht 5' 5" (1.651 m)   Wt 57.6 kg (126 lb 15.8 oz)   SpO2 98%   BMI 21.13 kg/m²       PHYSICAL EXAM  GENERAL: Older patient in no distress, sitting in the chair.  HEENT: Pupils equal and reactive. Extraocular movements intact. Nose intact. Pharynx moist, difficult to understand his speech  NECK: Supple.   HEART: Regular rate and rhythm. No murmur or gallop auscultated.  LUNGS:  There are crackles in both bases.  Lung excursion symmetrical. No change in fremitus.   ABDOMEN:  Peg tube in place.  Bowel sounds present. Non-tender, no masses palpated.  : Normal anatomy.  Lazaro with yellow urine.  EXTREMITIES: Normal muscle tone and joint movement, no cyanosis or clubbing.   LYMPHATICS: No adenopathy palpated, no edema.  SKIN: Dry, intact, no lesions.   NEURO: Cranial nerves II-XII intact. Motor strength 5/5 bilaterally, upper and lower extremities.  PSYCH: Appropriate affect.    Radiographs reviewed: view by direct vision   CTA chest " 6/4/23- no PE; mild diffuse bronchiectasis, emphysema, lung fields clear  Chest x-ray 6/5 clear      Labs     Sputum cx 3/29/230   ture, Respiratory with Smear  Moderate Growth Normal Oropharyngeal Anat    Culture, Respiratory with Smear  Moderate Growth Candida albicans Abnormal     Gram Stain  3+ Borderline (>25 WBC,>25 SQ EPI PER LPF)    Gram Stain  Normal Oropharyngeal Anat      sputum cx 1/31/23-   Component 4 mo ago   Culture, Respiratory with Smear  Heavy Growth Pseudomonas aeruginosa Abnormal     Culture, Respiratory with Smear  Heavy Growth Streptococcus agalactiae Abnormal     Culture, Respiratory with Smear  Light Growth Normal Oropharyngeal Anat    Gram Stain  3+ Borderline (>25 WBC,>25 SQ EPI PER LPF)    Gram Stain  Normal Oropharyngeal Anat      Recent Labs   Lab 06/07/23  0419   WBC 7.95   HGB 10.7*   HCT 36.5*   *     Recent Labs   Lab 06/07/23 0419      K 3.7      CO2 33*   BUN 21   CREATININE 0.5   GLU 98   CALCIUM 8.4*     No results for input(s): PH, PCO2, PO2, HCO3 in the last 24 hours.    Microbiology Results (last 7 days)       Procedure Component Value Units Date/Time    Blood Culture #1 **CANNOT BE ORDERED STAT** [[id number]] Collected: 06/04/23 0410    Order Status: Completed Specimen: Blood from Peripheral, Forearm, Left Updated: 06/07/23 0632     Blood Culture, Routine No Growth to date      No Growth to date      No Growth to date      No Growth to date    Culture, Respiratory with Gram Stain [[id number]]  (Abnormal)  (Susceptibility) Collected: 06/04/23 0923    Order Status: Completed Specimen: Respiratory from Endotracheal Aspirate Updated: 06/06/23 1331     Respiratory Culture PSEUDOMONAS AERUGINOSA  Moderate       Gram Stain (Respiratory) Many WBC's     Gram Stain (Respiratory) <10 epithelial cells per low power field.     Gram Stain (Respiratory) Many Gram negative rods     Gram Stain (Respiratory) Few Gram positive cocci    Blood Culture #2 **CANNOT BE ORDERED  STAT** [[id number]]  (Abnormal) Collected: 06/04/23 0611    Order Status: Completed Specimen: Blood from Peripheral, Forearm, Left Updated: 06/06/23 1145     Blood Culture, Routine Gram stain aer bottle: Gram positive cocci      Results called to and read back by:Ermelinda Anand RN-[id number];      06/05/2023  03:28 CJD      STREPTOCOCCUS AGALACTIAE (GROUP B)  Beta-hemolytic streptococci are routinely susceptible to   penicillins,cephalosporins and carbapenems.      Rapid Organism ID by PCR (from Blood culture) [[id number]]  (Abnormal) Collected: 06/04/23 0611    Order Status: Completed Updated: 06/05/23 0421     Enterococcus faecalis Not Detected     Enterococcus faecium Not Detected     Listeria monocytogenes Not Detected     Staphylococcus spp. Not Detected     Staphylococcus aureus Not Detected     Staphylococcus epidermidis Not Detected     Staphylococcus lugdunensis Not Detected     Streptococcus species See species for ID     Streptococcus agalactiae Detected     Streptococcus pneumoniae Not Detected     Streptococcus pyogenes Not Detected     Acinetobacter calcoaceticus/baumannii complex Not Detected     Bacteroides fragilis Not Detected     Enterobacterales Not Detected     Enterobacter cloacae complex Not Detected     Escherichia coli Not Detected     Klebsiella aerogenes Not Detected     Klebsiella oxytoca Not Detected     Klebsiella pneumoniae group Not Detected     Proteus Not Detected     Salmonella sp Not Detected     Serratia marcescens Not Detected     Haemophilus influenzae Not Detected     Neisseria meningtidis Not Detected     Pseudomonas aeruginosa Not Detected     Stenotrophomonas maltophilia Not Detected     Candida albicans Not Detected     Candida auris Not Detected     Candida glabrata Not Detected     Candida krusei Not Detected     Candida parapsilosis Not Detected     Candida tropicalis Not Detected     Cryptococcus neoformans/gattii Not Detected     CTX-M (ESBL ) Test not applicable      IMP (Carbapenem resistant) Test not applicable     KPC resistance gene (Carbapenem resistant) Test not applicable     mcr-1  Test not applicable     mec A/C  Test not applicable     mec A/C and MREJ (MRSA) gene Test not applicable     NDM (Carbapenem resistant) Test not applicable     OXA-48-like (Carbapenem resistant) Test not applicable     van A/B (VRE gene) Test not applicable     VIM (Carbapenem resistant) Test not applicable            Impression:  Active Hospital Problems    Diagnosis  POA    *Acute hypercapnic respiratory failure [J96.02]  Unknown    Shock, unspecified [R57.9]  Unknown    Bacteremia [R78.81]  Unknown    Laryngeal cancer s/p surgery and chemo [C32.9]  Unknown    COPD exacerbation [J44.1]  Unknown    PEG tube  [K94.23]  Unknown    On home oxygen therapy [Z99.81]  Not Applicable    Ex-smoker [Z87.891]  Not Applicable    Bronchiectasis [J47.9]  Unknown      Resolved Hospital Problems   No resolved problems to display.           Plan:   Acute on chronic hypercapneic respiratory failure with mechanical ventilation  - extubated  - chest x-ray remains clear  - need Respiratory to determine how much oxygen he needs to ambulate  COPD w/ acute exacerbation  - improved  - wean steroids  Bronchiectasis w/ recurrent infections  - sputum growing Pseudomonas, likely a colonizer and strep agalactiae  - change Rocephin to cefepime  - patient's pulmonologist states he is supposed to still be getting JC, month on,month off, Zithromax 3 times a week, Daliresp, and an unknown inhaler which he will look up and text to me.  -continue vest therapy b.i.d.  Bacteremia  - strep agalactiae  - cefepime will certainly cover the strep and he can go home on Rocephin to complete a week of therapy  - will continue cefepime while he is admitted to decrease his Pseudomonas burden  Hx laryngeal cancer in remission  Chronic pain  - narcotic pain meds increased 1 month prior   Anemia  -  improving  Thrombocytosis  Hypokalemia  - replace electrolytes  Mild hypoalbuminemia  - enteral feeding ongoing      Okay to move to the floor.  Discussed with nursing and hospitalist and patient and wife

## 2023-06-07 NOTE — SUBJECTIVE & OBJECTIVE
Interval History:     Review of Systems   Unable to perform ROS: Other   Objective:     Vital Signs (Most Recent):  Temp: 96.6 °F (35.9 °C) (06/07/23 0701)  Pulse: (!) 112 (06/07/23 1200)  Resp: (!) 28 (06/07/23 1437)  BP: 123/68 (06/07/23 1200)  SpO2: 98 % (06/07/23 1200) Vital Signs (24h Range):  Temp:  [96.6 °F (35.9 °C)-98.3 °F (36.8 °C)] 96.6 °F (35.9 °C)  Pulse:  [] 112  Resp:  [18-38] 28  SpO2:  [97 %-100 %] 98 %  BP: ()/(58-83) 123/68     Weight: 57.6 kg (126 lb 15.8 oz)  Body mass index is 21.13 kg/m².    Intake/Output Summary (Last 24 hours) at 6/7/2023 1508  Last data filed at 6/7/2023 1256  Gross per 24 hour   Intake 1725 ml   Output 2050 ml   Net -325 ml         Physical Exam  Vitals and nursing note reviewed.   Constitutional:       Appearance: He is well-developed.   HENT:      Head: Normocephalic.      Right Ear: External ear normal.      Left Ear: External ear normal.      Nose: Nose normal.      Mouth/Throat:      Mouth: Mucous membranes are moist.   Cardiovascular:      Rate and Rhythm: Normal rate.   Pulmonary:      Effort: Pulmonary effort is normal.   Musculoskeletal:         General: Normal range of motion.      Cervical back: Full passive range of motion without pain and normal range of motion.   Skin:     General: Skin is dry.   Neurological:      General: No focal deficit present.      Mental Status: He is alert.   Psychiatric:         Behavior: Behavior normal.           Significant Labs: All pertinent labs within the past 24 hours have been reviewed.  CBC:   Recent Labs   Lab 06/06/23 0305 06/07/23 0419   WBC 14.25* 7.95   HGB 10.2* 10.7*   HCT 35.2* 36.5*   * 486*     CMP:   Recent Labs   Lab 06/06/23 0305 06/07/23 0419    141   K 4.6 3.7    105   CO2 32* 33*   * 98   BUN 17 21   CREATININE 0.6 0.5   CALCIUM 8.6* 8.4*   ANIONGAP 6* 3*       Significant Imaging: I have reviewed all pertinent imaging results/findings within the past 24 hours.

## 2023-06-07 NOTE — PROGRESS NOTES
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  He was diagnosed with tonsillar cancer 4 years ago and completed the treatment and following up with the Oncology and currently in remission.  He is ex-smoker about 4 years and stopped after he got diagnosis of cancer.  This evening he started having shortness of breath and wheezing and brought to the emergency room.  ABG was done in the emergency room with hypercapnia and started BiPAP.  Later CT chest was done no pulmonary embolism and no infiltrate and chronic changes.  There is no  record in this hospital and patient always follow-up with [facility].  On examination patient is on   BiPAP and history mostly from wife.  Patient needed PEG tube feeding.  Lung exam with limited air entry.  ABG with a respiratory acidosis and will continue BiPAP.      Overview/Hospital Course:  06/05  Pt got extubated  Started on iv abx for bacteremia  Started on TF s    06/06  No new issues  Restarted on some home medications      Interval History:     Review of Systems   Unable to perform ROS: Other   Objective:     Vital Signs (Most Recent):  Temp: 97.9 °F (36.6 °C) (06/06/23 1600)  Pulse: 100 (06/06/23 1800)  Resp: (!) 22 (06/06/23 1800)  BP: 126/68 (06/06/23 1800)  SpO2: 100 % (06/06/23 1800) Vital Signs (24h Range):  Temp:  [97.8 °F (36.6 °C)-98.8 °F (37.1 °C)] 97.9 °F (36.6 °C)  Pulse:  [] 100  Resp:  [18-48] 22  SpO2:  [97 %-100 %] 100 %  BP:  (121-151)/(59-85) 126/68  Arterial Line BP: (129-146)/(61-71) 133/63     Weight: 57.6 kg (126 lb 15.8 oz)  Body mass index is 21.13 kg/m².    Intake/Output Summary (Last 24 hours) at 6/6/2023 1928  Last data filed at 6/6/2023 1812  Gross per 24 hour   Intake 1675 ml   Output 3500 ml   Net -1825 ml         Physical Exam  Vitals and nursing note reviewed.   Constitutional:       Appearance: Normal appearance. He is well-developed.   HENT:      Head: Atraumatic.      Right Ear: External ear normal.      Left Ear: External ear normal.      Nose: Nose normal.      Mouth/Throat:      Mouth: Mucous membranes are moist.   Cardiovascular:      Rate and Rhythm: Normal rate.   Pulmonary:      Effort: Pulmonary effort is normal.   Abdominal:      Palpations: Abdomen is soft.   Musculoskeletal:         General: Normal range of motion.      Cervical back: Full passive range of motion without pain and normal range of motion.   Skin:     General: Skin is dry.   Neurological:      Mental Status: Mental status is at baseline.   Psychiatric:         Behavior: Behavior normal.           Significant Labs: All pertinent labs within the past 24 hours have been reviewed.  CBC:   Recent Labs   Lab 06/05/23  0356 06/05/23  0841 06/05/23  1145 06/06/23  0305   WBC 12.02  --   --  14.25*   HGB 9.8*  --   --  10.2*   HCT 33.1* 30* 32* 35.2*   *  --   --  517*     CMP:   Recent Labs   Lab 06/05/23  0356 06/06/23  0305    143   K 3.4* 4.6    105   CO2 32* 32*   * 138*   BUN 19 17   CREATININE 0.6 0.6   CALCIUM 8.1* 8.6*   ANIONGAP 6* 6*       Significant Imaging: I have reviewed all pertinent imaging results/findings within the past 24 hours.      Assessment/Plan:      * Acute hypercapnic respiratory failure  Acute on chronic hypercapnic respiratory failure  Was intubated and now extubated     COPD exacerbation  Maintain present Rx      Shock, unspecified  Multifactorial: from bacteremia and propofol  Was on pressors for  short period of time       Bacteremia  Started on iv cefepime for strep agalactiae in blood cultures      Bronchiectasis  Aware       Ex-smoker  Aware       On home oxygen therapy  Aware       PEG tube   Started on TFs      Laryngeal cancer s/p surgery and chemo  Aware         VTE Risk Mitigation (From admission, onward)         Ordered     enoxaparin injection 40 mg  Every 24 hours         06/04/23 1504     IP VTE HIGH RISK PATIENT  Once         06/04/23 0525     Place sequential compression device  Until discontinued         06/04/23 0525                Discharge Planning   JENNA: 6/7/2023     Code Status: Full Code   Is the patient medically ready for discharge?:     Reason for patient still in hospital (select all that apply): Treatment  Discharge Plan A: Home with family                  Pradeep Myles MD  Department of Hospital Medicine   [facility]

## 2023-06-07 NOTE — SUBJECTIVE & OBJECTIVE
Interval History:     Review of Systems   Unable to perform ROS: Other   Objective:     Vital Signs (Most Recent):  Temp: 97.9 °F (36.6 °C) (06/06/23 1600)  Pulse: 100 (06/06/23 1800)  Resp: (!) 22 (06/06/23 1800)  BP: 126/68 (06/06/23 1800)  SpO2: 100 % (06/06/23 1800) Vital Signs (24h Range):  Temp:  [97.8 °F (36.6 °C)-98.8 °F (37.1 °C)] 97.9 °F (36.6 °C)  Pulse:  [] 100  Resp:  [18-48] 22  SpO2:  [97 %-100 %] 100 %  BP: (121-151)/(59-85) 126/68  Arterial Line BP: (129-146)/(61-71) 133/63     Weight: 57.6 kg (126 lb 15.8 oz)  Body mass index is 21.13 kg/m².    Intake/Output Summary (Last 24 hours) at 6/6/2023 1928  Last data filed at 6/6/2023 1812  Gross per 24 hour   Intake 1675 ml   Output 3500 ml   Net -1825 ml         Physical Exam  Vitals and nursing note reviewed.   Constitutional:       Appearance: Normal appearance. He is well-developed.   HENT:      Head: Atraumatic.      Right Ear: External ear normal.      Left Ear: External ear normal.      Nose: Nose normal.      Mouth/Throat:      Mouth: Mucous membranes are moist.   Cardiovascular:      Rate and Rhythm: Normal rate.   Pulmonary:      Effort: Pulmonary effort is normal.   Abdominal:      Palpations: Abdomen is soft.   Musculoskeletal:         General: Normal range of motion.      Cervical back: Full passive range of motion without pain and normal range of motion.   Skin:     General: Skin is dry.   Neurological:      Mental Status: Mental status is at baseline.   Psychiatric:         Behavior: Behavior normal.           Significant Labs: All pertinent labs within the past 24 hours have been reviewed.  CBC:   Recent Labs   Lab 06/05/23  0356 06/05/23  0841 06/05/23  1145 06/06/23  0305   WBC 12.02  --   --  14.25*   HGB 9.8*  --   --  10.2*   HCT 33.1* 30* 32* 35.2*   *  --   --  517*     CMP:   Recent Labs   Lab 06/05/23  0356 06/06/23  0305    143   K 3.4* 4.6    105   CO2 32* 32*   * 138*   BUN 19 17   CREATININE 0.6  0.6   CALCIUM 8.1* 8.6*   ANIONGAP 6* 6*       Significant Imaging: I have reviewed all pertinent imaging results/findings within the past 24 hours.

## 2023-06-07 NOTE — PROGRESS NOTES
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  He was diagnosed with tonsillar cancer 4 years ago and completed the treatment and following up with the Oncology and currently in remission.  He is ex-smoker about 4 years and stopped after he got diagnosis of cancer.  This evening he started having shortness of breath and wheezing and brought to the emergency room.  ABG was done in the emergency room with hypercapnia and started BiPAP.  Later CT chest was done no pulmonary embolism and no infiltrate and chronic changes.  There is no  record in this hospital and patient always follow-up with [facility].  On examination patient is on   BiPAP and history mostly from wife.  Patient needed PEG tube feeding.  Lung exam with limited air entry.  ABG with a respiratory acidosis and will continue BiPAP.      Overview/Hospital Course:  06/05  Pt got extubated  Started on iv abx for bacteremia  Started on TF s    06/06  No new issues  Restarted on some home medications    06/07  Transferred out of ICU  Can go home once Home iv abx has been arranged  Pts wife not interested in HH       Interval History:     Review of Systems   Unable to perform ROS: Other   Objective:     Vital Signs (Most Recent):  Temp: 96.6 °F (35.9 °C) (06/07/23 0701)  Pulse: (!) 112 (06/07/23 1200)  Resp: (!) 28 (06/07/23 1437)  BP: 123/68 (06/07/23 1200)  SpO2: 98 % (06/07/23 1200) Vital Signs (24h Range):  Temp:  [96.6 °F (35.9  °C)-98.3 °F (36.8 °C)] 96.6 °F (35.9 °C)  Pulse:  [] 112  Resp:  [18-38] 28  SpO2:  [97 %-100 %] 98 %  BP: ()/(58-83) 123/68     Weight: 57.6 kg (126 lb 15.8 oz)  Body mass index is 21.13 kg/m².    Intake/Output Summary (Last 24 hours) at 6/7/2023 1508  Last data filed at 6/7/2023 1256  Gross per 24 hour   Intake 1725 ml   Output 2050 ml   Net -325 ml         Physical Exam  Vitals and nursing note reviewed.   Constitutional:       Appearance: He is well-developed.   HENT:      Head: Normocephalic.      Right Ear: External ear normal.      Left Ear: External ear normal.      Nose: Nose normal.      Mouth/Throat:      Mouth: Mucous membranes are moist.   Cardiovascular:      Rate and Rhythm: Normal rate.   Pulmonary:      Effort: Pulmonary effort is normal.   Musculoskeletal:         General: Normal range of motion.      Cervical back: Full passive range of motion without pain and normal range of motion.   Skin:     General: Skin is dry.   Neurological:      General: No focal deficit present.      Mental Status: He is alert.   Psychiatric:         Behavior: Behavior normal.           Significant Labs: All pertinent labs within the past 24 hours have been reviewed.  CBC:   Recent Labs   Lab 06/06/23  0305 06/07/23  0419   WBC 14.25* 7.95   HGB 10.2* 10.7*   HCT 35.2* 36.5*   * 486*     CMP:   Recent Labs   Lab 06/06/23  0305 06/07/23  0419    141   K 4.6 3.7    105   CO2 32* 33*   * 98   BUN 17 21   CREATININE 0.6 0.5   CALCIUM 8.6* 8.4*   ANIONGAP 6* 3*       Significant Imaging: I have reviewed all pertinent imaging results/findings within the past 24 hours.      Assessment/Plan:      * Acute hypercapnic respiratory failure  Acute on chronic hypercapnic respiratory failure  Was intubated and now extubated   Can go home once iv abx (iv rocephin) has been arranged as home infusion x 1 week     COPD exacerbation  Maintain present Rx      Shock, unspecified  Multifactorial: from  bacteremia and propofol  Was on pressors for short period of time       Bacteremia  Started on iv cefepime for strep agalactiae in blood cultures  As per Pulmonary team pt can go home with one week course of iv rocephin       Bronchiectasis  Aware   Pseudomonas growth will be a chronic issue because of bronchiectasis       Ex-smoker  Aware       On home oxygen therapy  Aware       PEG tube   Started on TFs      Laryngeal cancer s/p surgery and chemo  Aware       VTE Risk Mitigation (From admission, onward)         Ordered     enoxaparin injection 40 mg  Every 24 hours         06/04/23 1504     IP VTE HIGH RISK PATIENT  Once         06/04/23 0525     Place sequential compression device  Until discontinued         06/04/23 0525                Discharge Planning   JENNA: 6/7/2023     Code Status: Full Code   Is the patient medically ready for discharge?:     Reason for patient still in hospital (select all that apply): Treatment  Discharge Plan A: Home with family                  Pradeep Myles MD  Department of Hospital Medicine   [facility]

## 2023-06-07 NOTE — ASSESSMENT & PLAN NOTE
Acute on chronic hypercapnic respiratory failure  Was intubated and now extubated   Can go home once iv abx (iv rocephin) has been arranged as home infusion x 1 week

## 2023-06-07 NOTE — PLAN OF CARE
Treatment plan discussed with patient with time allowed for questions and answers     Problem: Feeding Intolerance (Enteral Nutrition)  Goal: Feeding Tolerance  Outcome: Ongoing, Progressing-Tolerating feeding well     Problem: Fall Injury Risk  Goal: Absence of Fall and Fall-Related Injury  Outcome: Ongoing, Progressing-Safety maintained     Problem: Communication Impairment (Artificial Airway)  Goal: Effective Communication  Outcome: Ongoing, Progressing- Difficult to understand at times, Spouse helps with communication.

## 2023-06-07 NOTE — RESPIRATORY THERAPY
06/06/23 2006   Patient Assessment/Suction   Level of Consciousness (AVPU) alert   Respiratory Effort Unlabored   Expansion/Accessory Muscles/Retractions no use of accessory muscles   All Lung Fields Breath Sounds coarse   PRE-TX-O2   Device (Oxygen Therapy) nasal cannula   $ Is the patient on Low Flow Oxygen? Yes   Flow (L/min) 4   SpO2 100 %   Pulse Oximetry Type Continuous   $ Pulse Oximetry - Multiple Charge Pulse Oximetry - Multiple   Pulse 99   Resp 18   Aerosol Therapy   $ Aerosol Therapy Charges Aerosol Treatment   Daily Review of Necessity (SVN) completed   Respiratory Treatment Status (SVN) given   Treatment Route (SVN) mask;oxygen   Patient Position (SVN) semi-Giraldo's   Post Treatment Assessment (SVN) breath sounds unchanged   Signs of Intolerance (SVN) none   Breath Sounds Post-Respiratory Treatment   Throughout All Fields Post-Treatment no change   Post-treatment Heart Rate (beats/min) 98   Post-treatment Resp Rate (breaths/min) 18   Education   $ Education Bronchodilator;DME Oxygen;15 min   Respiratory Evaluation   $ Care Plan Tech Time 15 min   $ Eval/Re-eval Charges Re-evaluation

## 2023-06-07 NOTE — PT/OT/SLP EVAL
"Occupational Therapy   Evaluation    Name: Reji Romano  MRN: [id number]  Admitting Diagnosis: Acute hypercapnic respiratory failure  Recent Surgery: * No surgery found *      Recommendations:     Discharge Recommendations: home; pt/wife expressed that they are not interested in home health OT services  Discharge Equipment Recommendations:  none  Barriers to discharge:  None    Assessment:     Reji Romano is a 68 y.o. male with a medical diagnosis of Acute hypercapnic respiratory failure.  Performance deficits affecting function: weakness, impaired endurance, impaired self care skills, impaired functional mobility, gait instability, impaired balance, impaired cardiopulmonary response to activity.      Pt completed bed mobility and transfer to bedside chair with SBA-CGA on 3L02 with Sp02 WFL.  He declined ADLs today.    Rehab Prognosis: Good; patient would benefit from acute skilled OT services to address these deficits and reach maximum level of function.       Plan:     Patient to be seen 3 x/week to address the above listed problems via self-care/home management, therapeutic activities, therapeutic exercises  Plan of Care Expires: 07/07/23  Plan of Care Reviewed with: patient, spouse    Subjective     Chief Complaint: none  Patient/Family Comments/goals: none stated    Occupational Profile:  Living Environment: Lives with wife SSH with ramp; tub/shower  Previous level of function: Mod (I) short distance ambulation with RW or rollator; w/c community distances; wife assists with bathing/dressing although she reports he can complete them unassisted if needed; pt wears 2L of 02 at all times  Equipment Used at Home: walker, rolling, rollator, wheelchair  Assistance upon Discharge: wife    Pain/Comfort:  Pain Rating 1: 0/10  Pain Rating Post-Intervention 1:  (pt reported "a little" pain to posterior bilateral neck area)    Objective:     Communicated with: nurse prior to session.  Patient found supine with " telemetry, pulse ox (continuous), peripheral IV, blood pressure cuff, PEG Tube, arias catheter upon OT entry to room.    General Precautions: Standard, fall  Respiratory Status: Nasal cannula, flow 3 L/min    Occupational Performance:    Bed Mobility:    Patient completed Supine to Sit with stand by assistance    Functional Mobility/Transfers:  Patient completed Sit <> Stand Transfer with contact guard assistance  with  rolling walker   Patient completed Bed <> Chair Transfer using Stand Pivot technique with contact guard assistance with rolling walker  Pt required verbal cues for pacing and pursed lip breathing during bed mobility/transfer  Functional Mobility: NT    Activities of Daily Living:  Pt declined    Cognitive/Visual Perceptual:  Cognitive/Psychosocial Skills:     -       Follows Commands/attention:Follows multistep  commands    Physical Exam:  Upper Extremity Range of Motion:     -       Right Upper Extremity: WFL   -       Left Upper Extremity: WFL  Upper Extremity Strength:    -       Right Upper Extremity: WFL  -       Left Upper Extremity: WFL   Strength:    -       Right Upper Extremity: WFL  -       Left Upper Extremity: WFL  Fine Motor Coordination:    -       Intact  Gross motor coordination:   WFL    AMPAC 6 Click ADL:  AMPAC Total Score: 21    Treatment & Education:  Therapist provided facilitation and instruction of proper body mechanics and fall prevention strategies during tasks listed above.   Instructed patient to sit in bedside chair as tolerated daily to increase OOB/activity tolerance.   Instructed patient to use call light to have nursing staff assist with needs/transfers.   Discussed OT POC and answered all questions within OT scope of practice.  Whiteboard updated    Patient left up in chair with all lines intact, call button in reach, and wife present    GOALS:   Multidisciplinary Problems       Occupational Therapy Goals          Problem: Occupational Therapy    Goal Priority  Disciplines Outcome Interventions   Occupational Therapy Goal     OT, PT/OT     Description: Goals to be met by: 7/6/23     Patient will increase functional independence with ADLs by performing:    UE Dressing with Modified [place].  LE Dressing with Modified [place].  Grooming while standing at sink with Modified [place].  Toileting from toilet with Modified [place] for hygiene and clothing management.   Toilet transfer to toilet with Modified [place].                         History:     No past medical history on file.    No past surgical history on file.    Time Tracking:     OT Date of Treatment: 06/07/23  OT Start Time: 1014  OT Stop Time: 1041  OT Total Time (min): 27 min    Billable Minutes:Evaluation 04  Therapeutic Activity 23 6/7/2023

## 2023-06-07 NOTE — PT/OT/SLP PROGRESS
Physical Therapy Treatment    Patient Name:  Reji Romano   MRN:  [id number]    Recommendations:     Discharge Recommendations: home  Discharge Equipment Recommendations: none  Barriers to discharge: None    Assessment:     Reji Romano is a 68 y.o. male admitted with a medical diagnosis of Acute hypercapnic respiratory failure.  He presents with the following impairments/functional limitations: weakness, impaired endurance, impaired self care skills, gait instability, impaired balance, impaired cardiopulmonary response to activity. Pt found with HOB elevated with wife present. Pt agreeable to visit. Pt showed improvement with mobility. Pt ambulated 20 ft without NC with an post o2 sat of 74%. Returned to 2L o2 VI NC with quick recovery After seated rest break pt ambulated 20 ft with 2L  of via NC with post o2 sat of 72%. Quick recovery to 96% with emphasis on pursed lip breathing greatest emphasis on optimal exhale.     Rehab Prognosis: Fair; patient would benefit from acute skilled PT services to address these deficits and reach maximum level of function.    Recent Surgery: * No surgery found *      Plan:     During this hospitalization, patient to be seen 6 x/week to address the identified rehab impairments via gait training, therapeutic activities, therapeutic exercises, neuromuscular re-education and progress toward the following goals:    Plan of Care Expires:  07/07/23    Subjective     Chief Complaint: short of breath with gait   Patient/Family Comments/goals: to get up and move around   Pain/Comfort:  Pain Rating 1: 0/10  Pain Rating Post-Intervention 1: 0/10      Objective:     Communicated with RN prior to session.  Patient found up in chair with arias catheter, oxygen, peripheral IV, blood pressure cuff, PEG Tube, pulse ox (continuous) upon PT entry to room.     General Precautions: Standard, fall  Orthopedic Precautions: N/A  Braces: N/A  Respiratory Status: Nasal cannula, flow 2 L/min     Functional  Mobility:  Transfers:     Sit to Stand:  contact guard assistance with no AD x 2 attempts  Gait: 20 ft x2 CGA using no AD limited due to desat and shortness of breath      AM-PAC 6 CLICK MOBILITY          Treatment & Education:  Pt educated on POC, discharge recommendation, need for assist with mobility, importance of time OOB, use of call bell to seek assistance as needed and fall prevention, and breathing techniques to help O2 sat (pursed lip breathing).      Patient left up in chair with all lines intact, call button in reach, and wife present..    GOALS:   Multidisciplinary Problems       Physical Therapy Goals          Problem: Physical Therapy    Goal Priority Disciplines Outcome Goal Variances Interventions   Physical Therapy Goal     PT, PT/OT Ongoing, Progressing     Description: Goals to be met by: 23     Patient will increase functional independence with mobility by performin. Supine to sit with [place]  2. Sit to supine with [place]  3. Sit to stand transfer with [place]  4. Bed to chair transfer with Supervision using Rolling Walker  5. Gait  x 250 feet with Supervision using Rolling Walker.                          Time Tracking:     PT Received On: 23  PT Start Time: 1057     PT Stop Time: 1112  PT Total Time (min): 15 min     Billable Minutes: Gait Training 15    Treatment Type: Treatment  PT/PTA: PT     Number of PTA visits since last PT visit: 0     2023

## 2023-06-07 NOTE — PLAN OF CARE
Sent medical records and IV med order to [facility] via [facility] and called Aga, [phone] and informed of order sent.    2:37pm - Spoke to Aga at [facility], will meet with pt and spouse in the morning to do the IV education.     06/07/23 1201   Post-Acute Status   Post-Acute Authorization IV Infusion   IV Infusion Status Referral(s) sent

## 2023-06-07 NOTE — ASSESSMENT & PLAN NOTE
Started on iv cefepime for strep agalactiae in blood cultures  As per Pulmonary team pt can go home with one week course of iv rocephin

## 2023-06-08 VITALS
OXYGEN SATURATION: 97 % | RESPIRATION RATE: 20 BRPM | HEART RATE: 85 BPM | DIASTOLIC BLOOD PRESSURE: 74 MMHG | HEIGHT: 65 IN | SYSTOLIC BLOOD PRESSURE: 125 MMHG | WEIGHT: 127 LBS | TEMPERATURE: 98 F | BODY MASS INDEX: 21.16 KG/M2

## 2023-06-08 LAB
ANION GAP SERPL CALC-SCNC: 8 MMOL/L (ref 8–16)
BASOPHILS # BLD AUTO: 0.01 K/UL (ref 0–0.2)
BASOPHILS NFR BLD: 0.1 % (ref 0–1.9)
BUN SERPL-MCNC: 22 MG/DL (ref 8–23)
CALCIUM SERPL-MCNC: 8.5 MG/DL (ref 8.7–10.5)
CHLORIDE SERPL-SCNC: 100 MMOL/L (ref 95–110)
CO2 SERPL-SCNC: 31 MMOL/L (ref 23–29)
CREAT SERPL-MCNC: 0.5 MG/DL (ref 0.5–1.4)
DIFFERENTIAL METHOD: ABNORMAL
EOSINOPHIL # BLD AUTO: 0.1 K/UL (ref 0–0.5)
EOSINOPHIL NFR BLD: 0.8 % (ref 0–8)
ERYTHROCYTE [DISTWIDTH] IN BLOOD BY AUTOMATED COUNT: 15.1 % (ref 11.5–14.5)
EST. GFR  (NO RACE VARIABLE): >60 ML/MIN/1.73 M^2
GLUCOSE SERPL-MCNC: 110 MG/DL (ref 70–110)
HCT VFR BLD AUTO: 34 % (ref 40–54)
HGB BLD-MCNC: 9.9 G/DL (ref 14–18)
IMM GRANULOCYTES # BLD AUTO: 0.05 K/UL (ref 0–0.04)
IMM GRANULOCYTES NFR BLD AUTO: 0.6 % (ref 0–0.5)
LYMPHOCYTES # BLD AUTO: 0.8 K/UL (ref 1–4.8)
LYMPHOCYTES NFR BLD: 9.3 % (ref 18–48)
MCH RBC QN AUTO: 27.3 PG (ref 27–31)
MCHC RBC AUTO-ENTMCNC: 29.1 G/DL (ref 32–36)
MCV RBC AUTO: 94 FL (ref 82–98)
MONOCYTES # BLD AUTO: 0.5 K/UL (ref 0.3–1)
MONOCYTES NFR BLD: 6 % (ref 4–15)
NEUTROPHILS # BLD AUTO: 7 K/UL (ref 1.8–7.7)
NEUTROPHILS NFR BLD: 83.2 % (ref 38–73)
NRBC BLD-RTO: 0 /100 WBC
PLATELET # BLD AUTO: 465 K/UL (ref 150–450)
PMV BLD AUTO: 10.9 FL (ref 9.2–12.9)
POTASSIUM SERPL-SCNC: 4.3 MMOL/L (ref 3.5–5.1)
RBC # BLD AUTO: 3.63 M/UL (ref 4.6–6.2)
SODIUM SERPL-SCNC: 139 MMOL/L (ref 136–145)
WBC # BLD AUTO: 8.45 K/UL (ref 3.9–12.7)

## 2023-06-08 PROCEDURE — 25000003 PHARM REV CODE 250: Performed by: INTERNAL MEDICINE

## 2023-06-08 PROCEDURE — 99900026 HC AIRWAY MAINTENANCE (STAT)

## 2023-06-08 PROCEDURE — 97535 SELF CARE MNGMENT TRAINING: CPT

## 2023-06-08 PROCEDURE — 25000242 PHARM REV CODE 250 ALT 637 W/ HCPCS: Performed by: INTERNAL MEDICINE

## 2023-06-08 PROCEDURE — 99900035 HC TECH TIME PER 15 MIN (STAT)

## 2023-06-08 PROCEDURE — 99231 SBSQ HOSP IP/OBS SF/LOW 25: CPT | Mod: ,,, | Performed by: INTERNAL MEDICINE

## 2023-06-08 PROCEDURE — 94640 AIRWAY INHALATION TREATMENT: CPT

## 2023-06-08 PROCEDURE — 99900031 HC PATIENT EDUCATION (STAT)

## 2023-06-08 PROCEDURE — 80048 BASIC METABOLIC PNL TOTAL CA: CPT | Performed by: INTERNAL MEDICINE

## 2023-06-08 PROCEDURE — 63600175 PHARM REV CODE 636 W HCPCS: Performed by: INTERNAL MEDICINE

## 2023-06-08 PROCEDURE — 36415 COLL VENOUS BLD VENIPUNCTURE: CPT | Performed by: INTERNAL MEDICINE

## 2023-06-08 PROCEDURE — 27000221 HC OXYGEN, UP TO 24 HOURS

## 2023-06-08 PROCEDURE — 94761 N-INVAS EAR/PLS OXIMETRY MLT: CPT

## 2023-06-08 PROCEDURE — 94760 N-INVAS EAR/PLS OXIMETRY 1: CPT

## 2023-06-08 PROCEDURE — 99231 PR SUBSEQUENT HOSPITAL CARE,LEVL I: ICD-10-PCS | Mod: ,,, | Performed by: INTERNAL MEDICINE

## 2023-06-08 PROCEDURE — 85025 COMPLETE CBC W/AUTO DIFF WBC: CPT | Performed by: INTERNAL MEDICINE

## 2023-06-08 RX ORDER — PREDNISONE 20 MG/1
TABLET ORAL
Qty: 11 TABLET | Refills: 0 | Status: SHIPPED | OUTPATIENT
Start: 2023-06-09 | End: 2023-06-18

## 2023-06-08 RX ORDER — CEFTRIAXONE 1 G/1
2 INJECTION, POWDER, FOR SOLUTION INTRAMUSCULAR; INTRAVENOUS DAILY
Qty: 8 G | Refills: 0
Start: 2023-06-08 | End: 2023-06-12

## 2023-06-08 RX ADMIN — OXYCODONE HYDROCHLORIDE AND ACETAMINOPHEN 1 TABLET: 10; 325 TABLET ORAL at 11:06

## 2023-06-08 RX ADMIN — IPRATROPIUM BROMIDE AND ALBUTEROL SULFATE 3 ML: 2.5; .5 SOLUTION RESPIRATORY (INHALATION) at 12:06

## 2023-06-08 RX ADMIN — IPRATROPIUM BROMIDE AND ALBUTEROL SULFATE 3 ML: 2.5; .5 SOLUTION RESPIRATORY (INHALATION) at 07:06

## 2023-06-08 RX ADMIN — IPRATROPIUM BROMIDE AND ALBUTEROL SULFATE 3 ML: 2.5; .5 SOLUTION RESPIRATORY (INHALATION) at 04:06

## 2023-06-08 RX ADMIN — FLUTICASONE PROPIONATE 100 MCG: 50 SPRAY, METERED NASAL at 07:06

## 2023-06-08 RX ADMIN — LEVOTHYROXINE SODIUM 125 MCG: 0.1 TABLET ORAL at 05:06

## 2023-06-08 RX ADMIN — CEFEPIME 1 G: 1 INJECTION, POWDER, FOR SOLUTION INTRAMUSCULAR; INTRAVENOUS at 11:06

## 2023-06-08 RX ADMIN — AMLODIPINE BESYLATE 5 MG: 5 TABLET ORAL at 07:06

## 2023-06-08 RX ADMIN — CEFEPIME 1 G: 1 INJECTION, POWDER, FOR SOLUTION INTRAMUSCULAR; INTRAVENOUS at 03:06

## 2023-06-08 RX ADMIN — FAMOTIDINE 20 MG: 20 TABLET ORAL at 07:06

## 2023-06-08 RX ADMIN — IPRATROPIUM BROMIDE AND ALBUTEROL SULFATE 3 ML: 2.5; .5 SOLUTION RESPIRATORY (INHALATION) at 11:06

## 2023-06-08 RX ADMIN — OXYCODONE HYDROCHLORIDE AND ACETAMINOPHEN 1 TABLET: 10; 325 TABLET ORAL at 05:06

## 2023-06-08 RX ADMIN — PREDNISONE 40 MG: 20 TABLET ORAL at 07:06

## 2023-06-08 NOTE — CARE UPDATE
06/08/23 1132   Patient Assessment/Suction   Level of Consciousness (AVPU) alert   Respiratory Effort Normal;Unlabored   Expansion/Accessory Muscles/Retractions no use of accessory muscles   All Lung Fields Breath Sounds crackles, coarse   MIRIAM Breath Sounds crackles, coarse   LLL Breath Sounds crackles, coarse   RUL Breath Sounds crackles, coarse   RML Breath Sounds crackles, coarse   RLL Breath Sounds crackles, coarse   Rhythm/Pattern, Respiratory pattern regular;depth regular   Cough Frequency frequent   Cough Type productive;good   PRE-TX-O2   Device (Oxygen Therapy) nasal cannula   $ Is the patient on Low Flow Oxygen? Yes   Flow (L/min) 2   SpO2 97 %   Pulse Oximetry Type Intermittent   $ Pulse Oximetry - Single Charge Pulse Oximetry - Single   Resp 20   Positioning HOB elevated 45 degrees   Positioning   Head of Bed (HOB) Positioning HOB at 45 degrees   Aerosol Therapy   $ Aerosol Therapy Charges Aerosol Treatment   Daily Review of Necessity (SVN) completed   Respiratory Treatment Status (SVN) given   Treatment Route (SVN) oxygen;mask   Patient Position (SVN) HOB elevated   Post Treatment Assessment (SVN) breath sounds unchanged   Signs of Intolerance (SVN) none   Breath Sounds Post-Respiratory Treatment   Throughout All Fields Post-Treatment All Fields   Throughout All Fields Post-Treatment no change   Post-treatment Heart Rate (beats/min) 86   Post-treatment Resp Rate (breaths/min) 18   Education   $ Education Bronchodilator;15 min

## 2023-06-08 NOTE — PLAN OF CARE
KYLER spoke to Cindy with [facility].  Cindy stated education has been completed at bedside.  The patient does not want home health, so will have to be scheduled with [facility] to have the midline removed once infusions are completed.  MD notified of education being done.       06/08/23 1047   Post-Acute Status   Post-Acute Authorization IV Infusion   IV Infusion Status Set-up Complete/Auth obtained   Discharge Plan   Discharge Plan A Home   Discharge Plan B Home with family

## 2023-06-08 NOTE — NURSING
Nurses Note -- 4 Eyes      6/7/2023   10:10 PM      Skin assessed during: Transfer      [] No Altered Skin Integrity Present    []Prevention Measures Documented      [x] Yes- Altered Skin Integrity Present or Discovered   [] LDA Added if Not in Epic (Describe Wound)   [] New Altered Skin Integrity was Present on Admit and Documented in LDA   [x] Wound Image Taken    Wound Care Consulted? No    Attending Nurse:  PEPITO RACHEL RN     Second RN/Staff Member:  [id number]

## 2023-06-08 NOTE — PROGRESS NOTES
Pulmonary/Critical Care   progress note      06/08/2023      Admit Date: 6/4/2023  Reji Romano      Chief Complaint   Patient presents with    Shortness of Breath       History of Present Illness:  Pt is a 67 yo male with COPD, chronic bronchiectasis, chronic resp failure on home O2, laryngeal ca s/p treatment and reconstruction (6yr ago), dysphagia s/p PEG who presented to the ED due to confusion and respiratory failure. He required bipap in the ED then failed bipap and was intubated. His wife provides hx. She he is patient was more lethargic 2 days and then episode of urinary incontinence. Last night he was excessively confused and disoriented so she called EMS.  She states he does have chronic shortness breath and chronic mucopurulent cough. He uses percussive vest, nebulizer, home O2 2L, daliresp, azithromycin, bactrim and Jc nebs off and on. He follows at [facility] for pulmonary care. RT suctioned copious tan colored material out of his airway. Pt has chronic pain of neck and legs, takes oxycodone. Wife states his dose was just doubled about 1 month ago. He gets tube feeds via peg and nothing by mouth.    6/5 the patient's chest x-ray remains clear.  He is growing a Pseudomonas out of his sputum.  His wife does not know what is normal organism is.  She gives me his pulmonologist's name at [facility], Dr. Diaz, [phone]. I have left repeated messages for him.  His ventilatory requirements are small.  A CPAP trial was not done this morning.  Will proceed with 1 at this time with hopes to extubate.  The wife states that he is only on 2 L nasal cannula and moves very little because was told he could not have more oxygen because it was dangerous for him.  She states he desaturates when he walks even a short distance.  She states he is compliant with his vest.  The patient has Daliresp and azithromycin 3 times a week.  He has had JC in the past making me think that this Pseudomonas is simply a regular colonizer  "for him    6/6 the patient was extubated yesterday without difficulty.  He continues to expectorate purulent mucus.  He is on 3 L nasal cannula with sats of 99%.  We are awaiting transfer out of ICU.  I am still trying to reach his pulmonologist.     6/7 the patient looks much better.  He is up in the chair.  He is expectorating his sputum.  There is significantly less as he is receiving antibiotics.  He needs 7 days of IV antibiotics for his bacteremia.  But this can probably be Rocephin.  Discussed increasing his oxygen if needed while exercising.  I think we have communicated that this would be okay.  Patient states he is tolerating his enteral nutrition and having bowel movements.  Awaiting her med surg bed.    No change in the patient's Past Medical History, Past Surgical History, Social History or Family History since admission.    Review of Systems:   Unobtainable      Exam:Comprehensive exam done. /74   Pulse 85   Temp 98 °F (36.7 °C) (Oral)   Resp 20   Ht 5' 5" (1.651 m)   Wt 57.6 kg (126 lb 15.8 oz)   SpO2 97%   BMI 21.13 kg/m²       PHYSICAL EXAM  GENERAL: Older patient in no distress, sitting in the chair.  HEENT: Pupils equal and reactive. Extraocular movements intact. Nose intact. Pharynx moist, difficult to understand his speech  NECK: Supple.   HEART: Regular rate and rhythm. No murmur or gallop auscultated.  LUNGS:  There are crackles in both bases.  Lung excursion symmetrical. No change in fremitus.   ABDOMEN:  Peg tube in place.  Bowel sounds present. Non-tender, no masses palpated.  : Normal anatomy.  Lazaro with yellow urine.  EXTREMITIES: Normal muscle tone and joint movement, no cyanosis or clubbing.   LYMPHATICS: No adenopathy palpated, no edema.  SKIN: Dry, intact, no lesions.   NEURO: Cranial nerves II-XII intact. Motor strength 5/5 bilaterally, upper and lower extremities.  PSYCH: Appropriate affect.    Radiographs reviewed: view by direct vision   CTA chest 6/4/23- no PE; " mild diffuse bronchiectasis, emphysema, lung fields clear  Chest x-ray 6/5 clear      Labs     Sputum cx 3/29/230   ture, Respiratory with Smear  Moderate Growth Normal Oropharyngeal Anat    Culture, Respiratory with Smear  Moderate Growth Candida albicans Abnormal     Gram Stain  3+ Borderline (>25 WBC,>25 SQ EPI PER LPF)    Gram Stain  Normal Oropharyngeal Anat      sputum cx 1/31/23-   Component 4 mo ago   Culture, Respiratory with Smear  Heavy Growth Pseudomonas aeruginosa Abnormal     Culture, Respiratory with Smear  Heavy Growth Streptococcus agalactiae Abnormal     Culture, Respiratory with Smear  Light Growth Normal Oropharyngeal Anat    Gram Stain  3+ Borderline (>25 WBC,>25 SQ EPI PER LPF)    Gram Stain  Normal Oropharyngeal Anat      Recent Labs   Lab 06/08/23 0419   WBC 8.45   HGB 9.9*   HCT 34.0*   *     Recent Labs   Lab 06/08/23 0419      K 4.3      CO2 31*   BUN 22   CREATININE 0.5      CALCIUM 8.5*     No results for input(s): PH, PCO2, PO2, HCO3 in the last 24 hours.    Microbiology Results (last 7 days)       Procedure Component Value Units Date/Time    Blood Culture #1 **CANNOT BE ORDERED STAT** [[id number]] Collected: 06/04/23 0410    Order Status: Completed Specimen: Blood from Peripheral, Forearm, Left Updated: 06/08/23 0632     Blood Culture, Routine No Growth to date      No Growth to date      No Growth to date      No Growth to date      No Growth to date    Culture, Respiratory with Gram Stain [[id number]]  (Abnormal)  (Susceptibility) Collected: 06/04/23 0923    Order Status: Completed Specimen: Respiratory from Endotracheal Aspirate Updated: 06/06/23 1331     Respiratory Culture PSEUDOMONAS AERUGINOSA  Moderate       Gram Stain (Respiratory) Many WBC's     Gram Stain (Respiratory) <10 epithelial cells per low power field.     Gram Stain (Respiratory) Many Gram negative rods     Gram Stain (Respiratory) Few Gram positive cocci    Blood Culture #2 **CANNOT BE  ORDERED STAT** [[id number]]  (Abnormal) Collected: 06/04/23 0611    Order Status: Completed Specimen: Blood from Peripheral, Forearm, Left Updated: 06/06/23 1145     Blood Culture, Routine Gram stain aer bottle: Gram positive cocci      Results called to and read back by:Ermelinda Anand RN-[id number];      06/05/2023  03:28 CJD      STREPTOCOCCUS AGALACTIAE (GROUP B)  Beta-hemolytic streptococci are routinely susceptible to   penicillins,cephalosporins and carbapenems.      Rapid Organism ID by PCR (from Blood culture) [[id number]]  (Abnormal) Collected: 06/04/23 0611    Order Status: Completed Updated: 06/05/23 0421     Enterococcus faecalis Not Detected     Enterococcus faecium Not Detected     Listeria monocytogenes Not Detected     Staphylococcus spp. Not Detected     Staphylococcus aureus Not Detected     Staphylococcus epidermidis Not Detected     Staphylococcus lugdunensis Not Detected     Streptococcus species See species for ID     Streptococcus agalactiae Detected     Streptococcus pneumoniae Not Detected     Streptococcus pyogenes Not Detected     Acinetobacter calcoaceticus/baumannii complex Not Detected     Bacteroides fragilis Not Detected     Enterobacterales Not Detected     Enterobacter cloacae complex Not Detected     Escherichia coli Not Detected     Klebsiella aerogenes Not Detected     Klebsiella oxytoca Not Detected     Klebsiella pneumoniae group Not Detected     Proteus Not Detected     Salmonella sp Not Detected     Serratia marcescens Not Detected     Haemophilus influenzae Not Detected     Neisseria meningtidis Not Detected     Pseudomonas aeruginosa Not Detected     Stenotrophomonas maltophilia Not Detected     Candida albicans Not Detected     Candida auris Not Detected     Candida glabrata Not Detected     Candida krusei Not Detected     Candida parapsilosis Not Detected     Candida tropicalis Not Detected     Cryptococcus neoformans/gattii Not Detected     CTX-M (ESBL ) Test not  applicable     IMP (Carbapenem resistant) Test not applicable     KPC resistance gene (Carbapenem resistant) Test not applicable     mcr-1  Test not applicable     mec A/C  Test not applicable     mec A/C and MREJ (MRSA) gene Test not applicable     NDM (Carbapenem resistant) Test not applicable     OXA-48-like (Carbapenem resistant) Test not applicable     van A/B (VRE gene) Test not applicable     VIM (Carbapenem resistant) Test not applicable            Impression:  Active Hospital Problems    Diagnosis  POA    *Acute hypercapnic respiratory failure [J96.02]  Unknown    Shock, unspecified [R57.9]  Unknown    Bacteremia [R78.81]  Unknown    Laryngeal cancer s/p surgery and chemo [C32.9]  Unknown    COPD exacerbation [J44.1]  Unknown    PEG tube  [K94.23]  Unknown    On home oxygen therapy [Z99.81]  Not Applicable    Ex-smoker [Z87.891]  Not Applicable    Bronchiectasis [J47.9]  Unknown      Resolved Hospital Problems   No resolved problems to display.           Plan:   Acute on chronic hypercapneic hypoxemic respiratory failure with mechanical ventilation  - extubated  - chest x-ray remains clear  - on 2 liters at home  - need Respiratory to determine how much oxygen he needs to ambulate  COPD w/ acute exacerbation  - improved  - wean steroids  Bronchiectasis w/ recurrent infections  - sputum growing Pseudomonas, likely a colonizer and strep agalactiae  - change Rocephin to cefepime  - patient's pulmonologist states he is supposed to still be getting JC, month on,month off, Zithromax 3 times a week, Daliresp, and Breztri  -continue vest therapy b.i.d.  Bacteremia  - strep agalactiae  - cefepime will certainly cover the strep and he can go home on Rocephin to complete a week of therapy  - will continue cefepime while he is admitted to decrease his Pseudomonas burden  Hx laryngeal cancer in remission  Chronic pain  - narcotic pain meds increased 1 month prior   Anemia  - improving  Thrombocytosis  Hypokalemia  -  replace electrolytes  Mild hypoalbuminemia  - enteral feeding ongoing      Patient is to follow-up with his regular pulmonologist in 2 weeks time

## 2023-06-08 NOTE — PLAN OF CARE
Problem: Adult Inpatient Plan of Care  Goal: Plan of Care Review  Outcome: Met  Goal: Patient-Specific Goal (Individualized)  Outcome: Met  Goal: Absence of Hospital-Acquired Illness or Injury  Outcome: Met  Goal: Optimal Comfort and Wellbeing  Outcome: Met  Goal: Readiness for Transition of Care  Outcome: Met     Problem: Feeding Intolerance (Enteral Nutrition)  Goal: Feeding Tolerance  Outcome: Met     Problem: Infection  Goal: Absence of Infection Signs and Symptoms  Outcome: Met     Problem: Fall Injury Risk  Goal: Absence of Fall and Fall-Related Injury  Outcome: Met     Problem: Restraint, Nonbehavioral (Nonviolent)  Goal: Absence of Harm or Injury  Outcome: Met     Problem: Skin Injury Risk Increased  Goal: Skin Health and Integrity  Outcome: Met     Problem: Communication Impairment (Mechanical Ventilation, Invasive)  Goal: Effective Communication  Outcome: Met     Problem: Device-Related Complication Risk (Mechanical Ventilation, Invasive)  Goal: Optimal Device Function  Outcome: Met     Problem: Inability to Wean (Mechanical Ventilation, Invasive)  Goal: Mechanical Ventilation Liberation  Outcome: Met     Problem: Nutrition Impairment (Mechanical Ventilation, Invasive)  Goal: Optimal Nutrition Delivery  Outcome: Met     Problem: Skin and Tissue Injury (Mechanical Ventilation, Invasive)  Goal: Absence of Device-Related Skin and Tissue Injury  Outcome: Met     Problem: Ventilator-Induced Lung Injury (Mechanical Ventilation, Invasive)  Goal: Absence of Ventilator-Induced Lung Injury  Outcome: Met     Problem: Communication Impairment (Artificial Airway)  Goal: Effective Communication  Outcome: Met     Problem: Device-Related Complication Risk (Artificial Airway)  Goal: Optimal Device Function  Outcome: Met     Problem: Skin and Tissue Injury (Artificial Airway)  Goal: Absence of Device-Related Skin or Tissue Injury  Outcome: Met     Problem: Noninvasive Ventilation Acute  Goal: Effective Unassisted  Ventilation and Oxygenation  Outcome: Met     Problem: Impaired Wound Healing  Goal: Optimal Wound Healing  Outcome: Met

## 2023-06-08 NOTE — CARE UPDATE
06/08/23 0739   Patient Assessment/Suction   Level of Consciousness (AVPU) alert   Respiratory Effort Normal;Unlabored   Expansion/Accessory Muscles/Retractions no retractions;no use of accessory muscles   All Lung Fields Breath Sounds crackles, coarse;crackles   MIRIAM Breath Sounds crackles, coarse;crackles   LLL Breath Sounds crackles, coarse   RUL Breath Sounds crackles, coarse   RML Breath Sounds crackles, coarse   RLL Breath Sounds crackles, coarse   Rhythm/Pattern, Respiratory unlabored;pattern regular;depth regular   Cough Frequency frequent   Cough Type productive   PRE-TX-O2   Device (Oxygen Therapy) nasal cannula   $ Is the patient on Low Flow Oxygen? Yes   Flow (L/min) 2   SpO2 96 %   Pulse Oximetry Type Intermittent   $ Pulse Oximetry - Single Charge Pulse Oximetry - Single   Pulse 94   Resp 20   Positioning HOB elevated 45 degrees   Positioning   Head of Bed (HOB) Positioning HOB at 30-45 degrees   Aerosol Therapy   $ Aerosol Therapy Charges Aerosol Treatment   Daily Review of Necessity (SVN) completed   Respiratory Treatment Status (SVN) given   Treatment Route (SVN) mask;oxygen   Patient Position (SVN) HOB elevated   Post Treatment Assessment (SVN) breath sounds unchanged   Signs of Intolerance (SVN) none   Breath Sounds Post-Respiratory Treatment   Throughout All Fields Post-Treatment All Fields   Throughout All Fields Post-Treatment no change   Education   $ Education Bronchodilator;15 min

## 2023-06-08 NOTE — PLAN OF CARE
Problem: Occupational Therapy  Goal: Occupational Therapy Goal  Description: Goals to be met by: 7/6/23     Patient will increase functional independence with ADLs by performing:    UE Dressing with Modified [place].  LE Dressing with Modified [place].  Grooming while standing at sink with Modified [place].  Toileting from toilet with Modified [place] for hygiene and clothing management.   Toilet transfer to toilet with Modified [place].    Outcome: Ongoing, Progressing

## 2023-06-08 NOTE — PROCEDURES
Procedure Location:room 1101  Education/need:midline  Prep:chloraprep  Supplies:   Brand:Bard   Gauge/ Length:18ga/10cm   Lot #:[id number]  Extremity:left upper  Vessel:basilic  Attempts:1  Inserted by:Daniel Mcdonnell RN  Date/time placed:06/08/2023/1300  Tolerated:well  Dressing applied: CHG drgs applied

## 2023-06-08 NOTE — PT/OT/SLP PROGRESS
Physical Therapy      Patient Name:  Reji Romano   MRN:  [id number]    Patient not seen today secondary to Patient unwilling to participate, Patient fatigue. Will follow-up 6/9/23.

## 2023-06-08 NOTE — PT/OT/SLP PROGRESS
Occupational Therapy   Treatment    Name: Reji Romano  MRN: [id number]  Admitting Diagnosis:  Acute hypercapnic respiratory failure       Recommendations:     Discharge Recommendations: home  Discharge Equipment Recommendations:  none  Barriers to discharge:  None    Assessment:     Reji Romano is a 68 y.o. male with a medical diagnosis of Acute hypercapnic respiratory failure.  He presents with improving medical acuity and functional mobility. Patient participated in bed mobility, bed/chair transfer and grooming sitting in chair. Performance deficits affecting function are weakness, impaired endurance, impaired self care skills, impaired functional mobility, gait instability, impaired balance, impaired cardiopulmonary response to activity.     Rehab Prognosis:  Fair; patient would benefit from acute skilled OT services to address these deficits and reach maximum level of function.       Plan:     Patient to be seen 3 x/week to address the above listed problems via self-care/home management, therapeutic activities, therapeutic exercises  Plan of Care Expires: 07/07/23  Plan of Care Reviewed with: patient, spouse    Subjective     Chief Complaint: General weakness  Patient/Family Comments/goals: improved functional mobility and ADL independence.  Pain/Comfort:  Pain Rating 1: 0/10  Pain Rating Post-Intervention 1: 0/10    Objective:     Communicated with: nurse prior to session.  Patient found HOB elevated with telemetry, peripheral IV, oxygen upon OT entry to room.    General Precautions: Standard, fall    Orthopedic Precautions:N/A  Braces: N/A  Respiratory Status: Nasal cannula, flow 2 L/min     Occupational Performance:     Bed Mobility:    Patient completed Scooting/Bridging with stand by assistance  Patient completed Supine to Sit with stand by assistance     Functional Mobility/Transfers:  Patient completed Bed <> Chair Transfer using Step Transfer technique with stand by assistance with no assistive  device    Activities of Daily Living:  Grooming: stand by assistance to brush teeth sitting in chair.      [facility] 6 Click ADL: 19    Treatment & Education:  Patient instructed to sit in chair for at least 1 hour today.    Patient left up in chair with all lines intact, call button in reach, and spouse present    GOALS:   Multidisciplinary Problems       Occupational Therapy Goals          Problem: Occupational Therapy    Goal Priority Disciplines Outcome Interventions   Occupational Therapy Goal     OT, PT/OT Ongoing, Progressing    Description: Goals to be met by: 7/6/23     Patient will increase functional independence with ADLs by performing:    UE Dressing with Modified [place].  LE Dressing with Modified [place].  Grooming while standing at sink with Modified [place].  Toileting from toilet with Modified [place] for hygiene and clothing management.   Toilet transfer to toilet with Modified [place].                         Time Tracking:     OT Date of Treatment: 06/08/23  OT Start Time: 0910  OT Stop Time: 0928  OT Total Time (min): 18 min    Billable Minutes:Self Care/Home Management 18    OT/ANANYA: OT          6/8/2023

## 2023-06-08 NOTE — RESPIRATORY THERAPY
06/07/23 2007   Patient Assessment/Suction   Level of Consciousness (AVPU) alert   Respiratory Effort Unlabored   Expansion/Accessory Muscles/Retractions no use of accessory muscles   All Lung Fields Breath Sounds coarse;diminished   PRE-TX-O2   Device (Oxygen Therapy) nasal cannula   $ Is the patient on Low Flow Oxygen? Yes   Flow (L/min) 2   SpO2 100 %   Pulse Oximetry Type Continuous   $ Pulse Oximetry - Multiple Charge Pulse Oximetry - Multiple   Pulse 90   Resp 18   Aerosol Therapy   $ Aerosol Therapy Charges Aerosol Treatment   Daily Review of Necessity (SVN) completed   Respiratory Treatment Status (SVN) given   Treatment Route (SVN) mask;oxygen   Patient Position (SVN) HOB elevated   Post Treatment Assessment (SVN) breath sounds unchanged   Signs of Intolerance (SVN) none   Breath Sounds Post-Respiratory Treatment   Throughout All Fields Post-Treatment no change   Post-treatment Heart Rate (beats/min) 92   Post-treatment Resp Rate (breaths/min) 20   Education   $ Education Bronchodilator;15 min   Respiratory Evaluation   $ Care Plan Tech Time 15 min   $ Eval/Re-eval Charges Re-evaluation

## 2023-06-08 NOTE — PLAN OF CARE
DC orders and chart reviewed. No discharge needs noted.  Patient cleared for discharge from .  Patient is discharging home with IV Antibiotics with [facility].  Declined Home Health. Patient will return to [facility] for removal of midline Wednesday, 6/14 once antibiotic therapy completed. Scheduling notified.     Patient ok to discharge after receiving antibiotics today.       06/08/23 1147   Final Note   Assessment Type Final Discharge Note   Anticipated Discharge Disposition Home   What phone number can be called within the next 1-3 days to see how you are doing after discharge? [phone]   Hospital Resources/Appts/Education Provided Provided patient/caregiver with written discharge plan information   Post-Acute Status   Post-Acute Authorization IV Infusion   IV Infusion Status Set-up Complete/Auth obtained   Patient choice form signed by patient/caregiver List with quality metrics by geographic area provided   Discharge Delays None known at this time

## 2023-06-09 LAB — BACTERIA BLD CULT: NORMAL

## 2023-06-19 NOTE — DISCHARGE SUMMARY
[facility]  Discharge Summary  Patient Name: Reji Romano MRN: [id number]   Patient Class: IP- Inpatient  Length of Stay: 4   Admission Date: 6/4/2023  2:21 AM Attending Physician: Aly Cross MD   Primary Care Provider: [facility] Face-to-Face encounter date: 6/8/23   Chief Complaint: Shortness of Breath    Date of Discharge: 6/28/23  Discharge Disposition:Home or Self Care   Condition: Stable       Reason for Hospitalization     Active Hospital Problems    Diagnosis    *Acute hypercapnic respiratory failure    Shock, unspecified    Bacteremia    Laryngeal cancer s/p surgery and chemo    COPD exacerbation    PEG tube     On home oxygen therapy    Ex-smoker    Bronchiectasis         Brief History of Present Illness         68-year-old male with past medical history of 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  He was diagnosed with tonsillar cancer 4 years ago and completed the treatment and following up with the Oncology and currently in remission.  He is ex-smoker about 4 years and stopped after he got diagnosis of cancer.  This evening he started having shortness of breath and wheezing and brought to the emergency room.  ABG was done in the emergency room with hypercapnia and started BiPAP.  Later CT chest was done no pulmonary embolism and no infiltrate and chronic changes.  There is no  record in this hospital and patient always follow-up with [facility].  On examination patient is on   BiPAP and history mostly from wife.  Patient needed PEG tube feeding.  Lung exam with limited air entry.  ABG with a respiratory acidosis and will continue BiPAP.    Hospital Course By Problem with Pertinent Findings     Acute hypercapnic respiratory failure  Acute on chronic hypercapnic respiratory failure  Was intubated and now extubated   Can go home with iv  "abx (iv rocephin) has been arranged as home infusion x 1 week      COPD exacerbation  Maintain present Rx        Shock, unspecified  Multifactorial: from bacteremia and propofol  Was on pressors for short period of time         Bacteremia  Started on iv cefepime for strep agalactiae in blood cultures  As per Pulmonary team pt can go home with one week course of iv rocephin         Bronchiectasis  Pseudomonas likely colonization       Patient was seen and examined on the date of discharge and determined to be suitable for discharge.    Physical Exam  /74   Pulse 85   Temp 98 °F (36.7 °C) (Oral)   Resp 20   Ht 5' 5" (1.651 m)   Wt 57.6 kg (126 lb 15.8 oz)   SpO2 97%   BMI 21.13 kg/m²   Vitals reviewed.    Constitutional: No distress.   HENT: Atraumatic.   Cardiovascular: Normal rate, regular rhythm.  S1 S2.    Pulmonary/Chest: Effort normal. Faint crackles. No wheezes.   Abdominal: Soft. Bowel sounds are normal. Exhibits no distension and no mass. No tenderness  Neurological: Alert.   Skin: Skin is warm and dry.     Following labs were Reviewed   No results for input(s): WBC, HGB, HCT, PLT, GLUCOSE, CALCIUM, ALBUMIN, PROT, NA, K, CO2, CL, BUN, CREATININE, ALKPHOS, ALT, AST, BILITOT in the last 24 hours.  No results found for: POCTGLUCOSE     All labs within the past 24 hours have been reviewed    Microbiology Results (last 7 days)       Procedure Component Value Units Date/Time    Culture, Respiratory with Gram Stain [[id number]]  (Abnormal)  (Susceptibility) Collected: 06/04/23 0985    Order Status: Completed Specimen: Respiratory from Endotracheal Aspirate Updated: 06/06/23 1331     Respiratory Culture PSEUDOMONAS AERUGINOSA  Moderate       Gram Stain (Respiratory) Many WBC's     Gram Stain (Respiratory) <10 epithelial cells per low power field.     Gram Stain (Respiratory) Many Gram negative rods     Gram Stain (Respiratory) Few Gram positive cocci    Blood Culture #2 **CANNOT BE ORDERED STAT** " [[id number]]  (Abnormal) Collected: 06/04/23 0611    Order Status: Completed Specimen: Blood from Peripheral, Forearm, Left Updated: 06/06/23 1145     Blood Culture, Routine Gram stain aer bottle: Gram positive cocci      Results called to and read back by:Ermelinda Anand RN-[facility];      06/05/2023  03:28 CJD      STREPTOCOCCUS AGALACTIAE (GROUP B)  Beta-hemolytic streptococci are routinely susceptible to   penicillins,cephalosporins and carbapenems.      Rapid Organism ID by PCR (from Blood culture) [[id number]]  (Abnormal) Collected: 06/04/23 0611    Order Status: Completed Updated: 06/05/23 0421     Enterococcus faecalis Not Detected     Enterococcus faecium Not Detected     Listeria monocytogenes Not Detected     Staphylococcus spp. Not Detected     Staphylococcus aureus Not Detected     Staphylococcus epidermidis Not Detected     Staphylococcus lugdunensis Not Detected     Streptococcus species See species for ID     Streptococcus agalactiae Detected     Streptococcus pneumoniae Not Detected     Streptococcus pyogenes Not Detected     Acinetobacter calcoaceticus/baumannii complex Not Detected     Bacteroides fragilis Not Detected     Enterobacterales Not Detected     Enterobacter cloacae complex Not Detected     Escherichia coli Not Detected     Klebsiella aerogenes Not Detected     Klebsiella oxytoca Not Detected     Klebsiella pneumoniae group Not Detected     Proteus Not Detected     Salmonella sp Not Detected     Serratia marcescens Not Detected     Haemophilus influenzae Not Detected     Neisseria meningtidis Not Detected     Pseudomonas aeruginosa Not Detected     Stenotrophomonas maltophilia Not Detected     Candida albicans Not Detected     Candida auris Not Detected     Candida glabrata Not Detected     Candida krusei Not Detected     Candida parapsilosis Not Detected     Candida tropicalis Not Detected     Cryptococcus neoformans/gattii Not Detected     CTX-M (ESBL ) Test not applicable     IMP  (Carbapenem resistant) Test not applicable     KPC resistance gene (Carbapenem resistant) Test not applicable     mcr-1  Test not applicable     mec A/C  Test not applicable     mec A/C and MREJ (MRSA) gene Test not applicable     NDM (Carbapenem resistant) Test not applicable     OXA-48-like (Carbapenem resistant) Test not applicable     van A/B (VRE gene) Test not applicable     VIM (Carbapenem resistant) Test not applicable          X-Ray Chest AP Portable   Final Result      X-Ray Chest AP Portable   Final Result      CTA Chest Non-Coronary (PE Studies)   Final Result      X-Ray Chest AP Portable   Final Result          No results found for this or any previous visit.      Consultants and Procedures   Consultants:  Consults (From admission, onward)          Status Ordering Provider     IP consult to case management  Once        Provider:  (Not yet assigned)    Completed ROSEY FALLON     Inpatient consult to Pulmonology  Once        Provider:  Claudette Thompson MD    Completed ROSEY FALLON     Inpatient consult to Registered Dietitian/Nutritionist  Once        Provider:  (Not yet assigned)    Completed JACQUI SAWYER            Procedures:   * No surgery found *     Discharge Information:   Diet:  Resume Cardiac diet/Diabetic Diet    Physical Activity:  Activity as tolerated    Instructions:  1. Take all medications as prescribed  2. Keep all follow-up appointments  3. Return to the hospital or call your primary care physicians if any worsening symptoms such as chest pain, shortness of breath, bleeding,  intractable pain, fever >101 occur.      Follow-Up Appointments:  Please call your primary care physician to schedule an appointment in 1 week time.     Follow-up Information       [facility] Follow up.    Specialties: Surgical Oncology, Orthopedic Surgery, Genetics, Physical Medicine and Rehabilitation, Occupational Therapy, Radiology  Contact information:  [address] [facility]  [address]  [phone]                               Pending laboratory work/Tests to be performed/followed by the Primary care Physician:    The patient was discharged with discharge instructions reviewed in written and verbal form. All questions were answered and prescriptions were provided. The importance of making follow up appointments and compliance of medications has been emphasized. The patient will follow up in 1 week or sooner as needed with the PCP. Tthe patient understands and agrees with the plan. Upon discharge, patient needs to be on following medications.    Discharge Medications:     Medication List        CHANGE how you take these medications      predniSONE 20 MG tablet  Commonly known as: DELTASONE  Take 2 tablets (40 mg total) by mouth once daily for 3 days, THEN 1 tablet (20 mg total) once daily for 3 days, THEN 0.5 tablets (10 mg total) once daily for 3 days.  Start taking on: June 9, 2023  What changed: See the new instructions.            CONTINUE taking these medications      * albuterol 90 mcg/actuation inhaler  Commonly known as: PROVENTIL/VENTOLIN HFA     * albuterol 2.5 mg /3 mL (0.083 %) nebulizer solution  Commonly known as: PROVENTIL     amitriptyline 50 MG tablet  Commonly known as: ELAVIL     amLODIPine 5 MG tablet  Commonly known as: NORVASC     BREZTRI AEROSPHERE 160-9-4.8 mcg/actuation Hfaa  Generic drug: budesonide-glycopyr-formoterol     famotidine 20 MG tablet  Commonly known as: PEPCID     FeroSuL 325 mg (65 mg iron) Tab tablet  Generic drug: ferrous sulfate     fluticasone propionate 50 mcg/actuation nasal spray  Commonly known as: FLONASE     levothyroxine 125 MCG tablet  Commonly known as: SYNTHROID     oxyCODONE-acetaminophen  mg per tablet  Commonly known as: PERCOCET     roflumilast 500 mcg Tab  Commonly known as: DALIRESP     sodium chloride 3% 3 % nebulizer solution     tobramycin 300 mg/4 mL Nebu  Commonly known as: BETHKIS           * This list has 2  medication(s) that are the same as other medications prescribed for you. Read the directions carefully, and ask your doctor or other care provider to review them with you.                ASK your doctor about these medications      cefTRIAXone 1 gram injection  Commonly known as: ROCEPHIN  Inject 2 g into the vein once daily. for 4 days  Ask about: Should I take this medication?               Where to Get Your Medications        These medications were sent to [id number] - [address] & [address] [address]      Hours: 24-hours Phone: [phone]   predniSONE 20 MG tablet       Information about where to get these medications is not yet available    Ask your nurse or doctor about these medications  cefTRIAXone 1 gram injection           I spent 32 minutes preparing the discharge for this patient including reviewing records from previous encounters, preparation of discharge summary, assessing and final examination of the patient, discharge medicine reconciliation, discussing plan of care, follow up and education and prescriptions.       Aly Cross  [facility] Hospitalist

## 2023-09-04 PROBLEM — J96.02 ACUTE HYPERCAPNIC RESPIRATORY FAILURE: Status: RESOLVED | Noted: 2023-06-04 | Resolved: 2023-09-04

## 2024-03-04 ENCOUNTER — HOSPITAL ENCOUNTER (EMERGENCY)
Facility: HOSPITAL | Age: 69
Discharge: LEFT BEFORE DISCHARGE | End: 2024-03-04
Attending: EMERGENCY MEDICINE
Payer: COMMERCIAL

## 2024-03-04 VITALS
OXYGEN SATURATION: 99 % | BODY MASS INDEX: 19.83 KG/M2 | SYSTOLIC BLOOD PRESSURE: 135 MMHG | TEMPERATURE: 98 F | HEART RATE: 69 BPM | DIASTOLIC BLOOD PRESSURE: 75 MMHG | WEIGHT: 119 LBS | RESPIRATION RATE: 22 BRPM | HEIGHT: 65 IN

## 2024-03-04 DIAGNOSIS — S42.292A CLOSED FRACTURE OF HEAD OF LEFT HUMERUS, INITIAL ENCOUNTER: Primary | ICD-10-CM

## 2024-03-04 PROCEDURE — 99283 EMERGENCY DEPT VISIT LOW MDM: CPT | Mod: 25

## 2024-03-04 NOTE — ED PROVIDER NOTES
Encounter Date: 3/4/2024       History     Chief Complaint   Patient presents with    Fall     Pt reports ground level fall while using the restroom, pt reports while urinating, L leg slipped and he fell to the ground, c/o L shoulder pain, neg LOC, pt denies striking head, fall occurred today      69-year-old male presents emergency department past medical history of throat cancer had previous chemo and radiation with current PEG tube presents emergency department with complaint of left shoulder pain.  Patient states that he had a mechanical fall when he was in the bathroom urinating.  He states he slipped and fell on his left shoulder he denies striking his head.  On initial presentation patient had a blood pressure of 89/70 repeat blood pressure when entering the room was 120/76.  Patient has no complaints and does not wish for any ER workup other than x-ray.  Reports takes oxycodone at home last dose was at 5:30 a.m.      Review of patient's allergies indicates:   Allergen Reactions    Propoxyphene Anaphylaxis     No past medical history on file.  No past surgical history on file.  No family history on file.  Social History     Tobacco Use    Smoking status: Former     Types: Cigarettes   Substance Use Topics    Alcohol use: Not Currently    Drug use: Not Currently     Review of Systems   Constitutional: Negative.    HENT: Negative.     Respiratory: Negative.     Cardiovascular: Negative.    Musculoskeletal:         Left shoulder pain   Neurological: Negative.    Hematological: Negative.    Psychiatric/Behavioral: Negative.     All other systems reviewed and are negative.      Physical Exam     Initial Vitals [03/04/24 0902]   BP Pulse Resp Temp SpO2   (!) 89/70 (!) 114 (!) 24 97.9 °F (36.6 °C) (!) 90 %      MAP       --         Physical Exam    Nursing note and vitals reviewed.  Constitutional: He appears well-developed and well-nourished.   HENT:   Head: Normocephalic and atraumatic.   Right Ear: External ear  normal.   Left Ear: External ear normal.   Eyes: EOM are normal. Pupils are equal, round, and reactive to light.   Pulmonary/Chest: Breath sounds normal.   Abdominal: Abdomen is soft.   Musculoskeletal:      Left shoulder: Bony tenderness present. No crepitus. Decreased range of motion. Normal pulse.     Neurological: He is alert and oriented to person, place, and time.         ED Course   Splint Application    Date/Time: 3/4/2024 11:26 AM    Performed by: Elyssa Altamirano FNP  Authorized by: Terrence Petit MD  Location details: left arm  Post-procedure: The splinted body part was neurovascularly unchanged following the procedure.  Patient tolerance: Patient tolerated the procedure well with no immediate complications        Labs Reviewed - No data to display       Imaging Results              X-Ray Shoulder 2 or More Views Left (Final result)  Result time 03/04/24 09:56:08      Final result by January Sharpe MD (03/04/24 09:56:08)                   Narrative:    3 views of the left shoulder    Clinical history is pain after fall    There is a nondisplaced impacted humeral neck.. There are degenerative changes of the AC joint. There are no additional fractures or dislocations. The lung apices are clear.    IMPRESSION: Nondisplaced impacted humeral neck fracture    Degenerative changes of the AC joint  Electronically signed by:  January Sharpe MD  03/04/2024 09:56 AM [facility] Workstation: [id number]                                     Medications - No data to display  Medical Decision Making  69-year-old male presents emergency department past medical history of throat cancer had previous chemo and radiation with current PEG tube presents emergency department with complaint of left shoulder pain.  Patient states that he had a mechanical fall when he was in the bathroom urinating.  He states he slipped and fell on his left shoulder he denies striking his head.  On initial presentation patient had a blood  pressure of 89/70 repeat blood pressure when entering the room was 120/76.  Patient has no complaints and does not wish for any ER workup other than x-ray.  Reports takes oxycodone at home last dose was at 5:30 a.m.    Considerations include but not limited to, shoulder dislocation, shoulder fracture, electrolyte abnormalities, ACS, CHI, ICH    69-year-old male presents emergency department past medical history of throat cancer reports that he had a mechanical fall while in the restroom urinating states he slipped and fell on his left shoulder denies striking his head or having any LOC patient denies any symptoms preceding fall suggestive of syncope including chest pain shortness of breath or dizziness.  The patient does take oxycodone 15 mg for pain at home his last dose was at 5:30 a.m. on initial evaluation patient's blood pressure noted to be low however will to the room his blood pressure has been normalized.  X-ray imaging of the left shoulder does reveal Nondisplaced impacted humeral neck fracture.  Patient will be placed in a splint he will be referred to Orthopedics again patient refuses any further workup including labs, EKG, CT imaging of the head and neck to exclude any type of syncopal episode related to hypotension and or ACS.  The patient is currently awake alert and oriented he understands the risk and benefits he did sign out against medical advice he understands he may return if his condition becomes worse for any concerns.    Amount and/or Complexity of Data Reviewed  External Data Reviewed: notes.  Radiology: ordered. Decision-making details documented in ED Course.               ED Course as of 03/04/24 1132   Mon Mar 04, 2024   2515 Patient reports that he had a mechanical fall does not wish for any blood work or any further ER workup right now he just once a x-ray of his left shoulder [MP]      ED Course User Index  [MP] Elyssa Altamirano FNP                           Clinical  Impression:  Final diagnoses:  [S42.292A] Closed fracture of head of left humerus, initial encounter (Primary)          ED Disposition Condition    Discharge Stable          ED Prescriptions    None       Follow-up Information       Follow up With Specialties Details Why Contact Info    Frank Huddleston MD Orthopedic Surgery, Surgery, Sports Medicine Schedule an appointment as soon as possible for a visit in 3 days  [address] [address]  [phone]               Elyssa Altamirano, ROWENA  03/04/24 1132

## 2024-03-04 NOTE — DISCHARGE INSTRUCTIONS
Sling as directed   Please follow-up with the orthopedist as directed  Return for any concerns  Take your pain medication as directed by your physician

## 2025-03-13 ENCOUNTER — HOSPITAL ENCOUNTER (INPATIENT)
Facility: HOSPITAL | Age: 70
LOS: 6 days | Discharge: HOME HEALTH | DRG: 871 | End: 2025-03-20
Attending: STUDENT IN AN ORGANIZED HEALTH CARE EDUCATION/TRAINING PROGRAM | Admitting: HOSPITALIST
Payer: COMMERCIAL

## 2025-03-13 DIAGNOSIS — I87.8 POOR VENOUS ACCESS: ICD-10-CM

## 2025-03-13 DIAGNOSIS — J96.21 ACUTE ON CHRONIC RESPIRATORY FAILURE WITH HYPOXIA: ICD-10-CM

## 2025-03-13 DIAGNOSIS — R06.02 SHORTNESS OF BREATH: ICD-10-CM

## 2025-03-13 DIAGNOSIS — I95.9 HYPOTENSION, UNSPECIFIED HYPOTENSION TYPE: ICD-10-CM

## 2025-03-13 DIAGNOSIS — J44.1 COPD EXACERBATION: Primary | ICD-10-CM

## 2025-03-13 DIAGNOSIS — E87.20 LACTIC ACIDOSIS: ICD-10-CM

## 2025-03-13 DIAGNOSIS — A41.9 SEPSIS, DUE TO UNSPECIFIED ORGANISM, UNSPECIFIED WHETHER ACUTE ORGAN DYSFUNCTION PRESENT: ICD-10-CM

## 2025-03-13 DIAGNOSIS — R79.89 ELEVATED TROPONIN: ICD-10-CM

## 2025-03-13 LAB
LDH SERPL L TO P-CCNC: 2.31 MMOL/L (ref 0.5–2.2)
SAMPLE: ABNORMAL

## 2025-03-13 PROCEDURE — 87040 BLOOD CULTURE FOR BACTERIA: CPT | Performed by: NURSE PRACTITIONER

## 2025-03-13 PROCEDURE — 27000221 HC OXYGEN, UP TO 24 HOURS

## 2025-03-13 PROCEDURE — 87502 INFLUENZA DNA AMP PROBE: CPT | Performed by: STUDENT IN AN ORGANIZED HEALTH CARE EDUCATION/TRAINING PROGRAM

## 2025-03-13 PROCEDURE — 25000003 PHARM REV CODE 250: Performed by: STUDENT IN AN ORGANIZED HEALTH CARE EDUCATION/TRAINING PROGRAM

## 2025-03-13 PROCEDURE — 87389 HIV-1 AG W/HIV-1&-2 AB AG IA: CPT | Performed by: EMERGENCY MEDICINE

## 2025-03-13 PROCEDURE — 63600175 PHARM REV CODE 636 W HCPCS: Mod: JZ,TB | Performed by: STUDENT IN AN ORGANIZED HEALTH CARE EDUCATION/TRAINING PROGRAM

## 2025-03-13 PROCEDURE — 86803 HEPATITIS C AB TEST: CPT | Performed by: EMERGENCY MEDICINE

## 2025-03-13 PROCEDURE — 96375 TX/PRO/DX INJ NEW DRUG ADDON: CPT

## 2025-03-13 PROCEDURE — 96361 HYDRATE IV INFUSION ADD-ON: CPT

## 2025-03-13 PROCEDURE — 85007 BL SMEAR W/DIFF WBC COUNT: CPT | Performed by: NURSE PRACTITIONER

## 2025-03-13 PROCEDURE — 94640 AIRWAY INHALATION TREATMENT: CPT

## 2025-03-13 PROCEDURE — 25000242 PHARM REV CODE 250 ALT 637 W/ HCPCS: Performed by: STUDENT IN AN ORGANIZED HEALTH CARE EDUCATION/TRAINING PROGRAM

## 2025-03-13 PROCEDURE — 85027 COMPLETE CBC AUTOMATED: CPT | Performed by: NURSE PRACTITIONER

## 2025-03-13 PROCEDURE — 36415 COLL VENOUS BLD VENIPUNCTURE: CPT | Performed by: NURSE PRACTITIONER

## 2025-03-13 PROCEDURE — 94761 N-INVAS EAR/PLS OXIMETRY MLT: CPT

## 2025-03-13 PROCEDURE — 99291 CRITICAL CARE FIRST HOUR: CPT

## 2025-03-13 PROCEDURE — 83880 ASSAY OF NATRIURETIC PEPTIDE: CPT | Performed by: STUDENT IN AN ORGANIZED HEALTH CARE EDUCATION/TRAINING PROGRAM

## 2025-03-13 PROCEDURE — 87635 SARS-COV-2 COVID-19 AMP PRB: CPT | Performed by: STUDENT IN AN ORGANIZED HEALTH CARE EDUCATION/TRAINING PROGRAM

## 2025-03-13 RX ORDER — LEVALBUTEROL INHALATION SOLUTION 1.25 MG/3ML
1.25 SOLUTION RESPIRATORY (INHALATION)
Status: COMPLETED | OUTPATIENT
Start: 2025-03-13 | End: 2025-03-13

## 2025-03-13 RX ORDER — HYDROXYZINE HYDROCHLORIDE 25 MG/1
12.5 TABLET, FILM COATED ORAL 3 TIMES DAILY PRN
COMMUNITY
Start: 2025-03-11 | End: 2025-06-09

## 2025-03-13 RX ORDER — OXYCODONE HYDROCHLORIDE 20 MG/1
1 TABLET ORAL 3 TIMES DAILY PRN
COMMUNITY

## 2025-03-13 RX ORDER — AZITHROMYCIN 500 MG/1
500 TABLET, FILM COATED ORAL
Status: ON HOLD | COMMUNITY
End: 2025-03-17 | Stop reason: HOSPADM

## 2025-03-13 RX ORDER — METHYLPREDNISOLONE SOD SUCC 125 MG
125 VIAL (EA) INJECTION
Status: COMPLETED | OUTPATIENT
Start: 2025-03-13 | End: 2025-03-13

## 2025-03-13 RX ADMIN — SODIUM CHLORIDE 1497 ML: 9 INJECTION, SOLUTION INTRAVENOUS at 11:03

## 2025-03-13 RX ADMIN — LEVALBUTEROL HYDROCHLORIDE 1.25 MG: 1.25 SOLUTION RESPIRATORY (INHALATION) at 11:03

## 2025-03-13 RX ADMIN — METHYLPREDNISOLONE SODIUM SUCCINATE 125 MG: 125 INJECTION, POWDER, FOR SOLUTION INTRAMUSCULAR; INTRAVENOUS at 11:03

## 2025-03-13 NOTE — Clinical Note
Diagnosis: COPD exacerbation [821026]   Future Attending Provider: DWIGHT PRADO [[id number]]   Place in Observation: [facility] [[id number]]

## 2025-03-14 ENCOUNTER — CLINICAL SUPPORT (OUTPATIENT)
Dept: CARDIOLOGY | Facility: HOSPITAL | Age: 70
End: 2025-03-14
Attending: HOSPITALIST
Payer: COMMERCIAL

## 2025-03-14 PROBLEM — E43 SEVERE MALNUTRITION: Status: ACTIVE | Noted: 2025-03-14

## 2025-03-14 PROBLEM — J18.9 COMMUNITY ACQUIRED PNEUMONIA: Status: ACTIVE | Noted: 2025-03-14

## 2025-03-14 PROBLEM — A41.9 SEPSIS: Status: ACTIVE | Noted: 2025-03-14

## 2025-03-14 LAB
ADENOVIRUS: NOT DETECTED
ALBUMIN SERPL BCP-MCNC: 3 G/DL (ref 3.5–5.2)
ALP SERPL-CCNC: 90 U/L (ref 55–135)
ALT SERPL W/O P-5'-P-CCNC: 23 U/L (ref 10–44)
ANION GAP SERPL CALC-SCNC: 6 MMOL/L (ref 8–16)
ANISOCYTOSIS BLD QL SMEAR: SLIGHT
AORTIC ROOT ANNULUS: 2.9 CM
AORTIC VALVE CUSP SEPERATION: 1.5 CM
APICAL FOUR CHAMBER EJECTION FRACTION: 73 %
APICAL TWO CHAMBER EJECTION FRACTION: 59 %
AST SERPL-CCNC: 21 U/L (ref 10–40)
AV INDEX (PROSTH): 0.91
AV MEAN GRADIENT: 4 MMHG
AV PEAK GRADIENT: 8 MMHG
AV VALVE AREA BY VELOCITY RATIO: 1.9 CM²
AV VALVE AREA: 2.1 CM²
AV VELOCITY RATIO: 0.86
BASOPHILS NFR BLD: 0 % (ref 0–1.9)
BILIRUB SERPL-MCNC: 0.4 MG/DL (ref 0.1–1)
BILIRUB UR QL STRIP: NEGATIVE
BNP SERPL-MCNC: 99 PG/ML (ref 0–99)
BORDETELLA PARAPERTUSSIS (IS1001): NOT DETECTED
BORDETELLA PERTUSSIS (PTXP): NOT DETECTED
BSA FOR ECHO PROCEDURE: 1.51 M2
BUN SERPL-MCNC: 22 MG/DL (ref 8–23)
CALCIUM SERPL-MCNC: 8.6 MG/DL (ref 8.7–10.5)
CHLAMYDIA PNEUMONIAE: NOT DETECTED
CHLORIDE SERPL-SCNC: 96 MMOL/L (ref 95–110)
CLARITY UR: CLEAR
CO2 SERPL-SCNC: 29 MMOL/L (ref 23–29)
COLOR UR: YELLOW
CORONAVIRUS 229E, COMMON COLD VIRUS: NOT DETECTED
CORONAVIRUS HKU1, COMMON COLD VIRUS: NOT DETECTED
CORONAVIRUS NL63, COMMON COLD VIRUS: NOT DETECTED
CORONAVIRUS OC43, COMMON COLD VIRUS: NOT DETECTED
CREAT SERPL-MCNC: 0.8 MG/DL (ref 0.5–1.4)
CV ECHO LV RWT: 0.47 CM
DIFFERENTIAL METHOD BLD: ABNORMAL
DOP CALC AO PEAK VEL: 1.4 M/S
DOP CALC AO VTI: 20.2 CM
DOP CALC LVOT AREA: 2.3 CM2
DOP CALC LVOT DIAMETER: 1.7 CM
DOP CALC LVOT PEAK VEL: 1.2 M/S
DOP CALC LVOT STROKE VOLUME: 41.5 CM3
DOP CALC MV VTI: 17.2 CM
DOP CALCLVOT PEAK VEL VTI: 18.3 CM
E WAVE DECELERATION TIME: 148 MSEC
E/A RATIO: 1.01
E/E' RATIO: 8 M/S
ECHO LV POSTERIOR WALL: 0.9 CM (ref 0.6–1.1)
EOSINOPHIL NFR BLD: 0 % (ref 0–8)
ERYTHROCYTE [DISTWIDTH] IN BLOOD BY AUTOMATED COUNT: 13.7 % (ref 11.5–14.5)
EST. GFR  (NO RACE VARIABLE): >60 ML/MIN/1.73 M^2
FLUBV RNA NPH QL NAA+NON-PROBE: NOT DETECTED
FRACTIONAL SHORTENING: 39.5 % (ref 28–44)
GLUCOSE SERPL-MCNC: 142 MG/DL (ref 70–110)
GLUCOSE UR QL STRIP: NEGATIVE
HCT VFR BLD AUTO: 37.3 % (ref 40–54)
HCV AB SERPL QL IA: NEGATIVE
HGB BLD-MCNC: 11.9 G/DL (ref 14–18)
HGB UR QL STRIP: NEGATIVE
HIV 1+2 AB+HIV1 P24 AG SERPL QL IA: NEGATIVE
HPIV1 RNA NPH QL NAA+NON-PROBE: NOT DETECTED
HPIV2 RNA NPH QL NAA+NON-PROBE: NOT DETECTED
HPIV3 RNA NPH QL NAA+NON-PROBE: NOT DETECTED
HPIV4 RNA NPH QL NAA+NON-PROBE: NOT DETECTED
HR MV ECHO: 112 BPM
HUMAN METAPNEUMOVIRUS: NOT DETECTED
IMM GRANULOCYTES # BLD AUTO: ABNORMAL K/UL (ref 0–0.04)
IMM GRANULOCYTES NFR BLD AUTO: ABNORMAL % (ref 0–0.5)
INFLUENZA A, MOLECULAR: NEGATIVE
INFLUENZA A: NOT DETECTED
INFLUENZA B, MOLECULAR: NEGATIVE
INTERVENTRICULAR SEPTUM: 0.6 CM (ref 0.6–1.1)
IVC DIAMETER: 1.3 CM
KETONES UR QL STRIP: NEGATIVE
LACTATE SERPL-SCNC: 1.5 MMOL/L (ref 0.5–1.9)
LEFT ATRIUM AREA SYSTOLIC (APICAL 2 CHAMBER): 9.85 CM2
LEFT ATRIUM AREA SYSTOLIC (APICAL 4 CHAMBER): 6.05 CM2
LEFT ATRIUM VOLUME INDEX MOD: 8 ML/M2
LEFT ATRIUM VOLUME MOD: 12 ML
LEFT INTERNAL DIMENSION IN SYSTOLE: 2.3 CM (ref 2.1–4)
LEFT VENTRICLE DIASTOLIC VOLUME INDEX: 40.52 ML/M2
LEFT VENTRICLE DIASTOLIC VOLUME: 62 ML
LEFT VENTRICLE END DIASTOLIC VOLUME APICAL 2 CHAMBER: 47.2 ML
LEFT VENTRICLE END DIASTOLIC VOLUME APICAL 4 CHAMBER: 64.6 ML
LEFT VENTRICLE END SYSTOLIC VOLUME APICAL 2 CHAMBER: 16.8 ML
LEFT VENTRICLE END SYSTOLIC VOLUME APICAL 4 CHAMBER: 7.58 ML
LEFT VENTRICLE MASS INDEX: 51.5 G/M2
LEFT VENTRICLE SYSTOLIC VOLUME INDEX: 11.8 ML/M2
LEFT VENTRICLE SYSTOLIC VOLUME: 18 ML
LEFT VENTRICULAR INTERNAL DIMENSION IN DIASTOLE: 3.8 CM (ref 3.5–6)
LEFT VENTRICULAR MASS: 78.8 G
LEUKOCYTE ESTERASE UR QL STRIP: NEGATIVE
LV LATERAL E/E' RATIO: 7.3 M/S
LV SEPTAL E/E' RATIO: 7.9 M/S
LVED V (TEICH): 62 ML
LVES V (TEICH): 18.1 ML
LVOT MG: 3 MMHG
LVOT MV: 0.83 CM/S
LYMPHOCYTES NFR BLD: 5 % (ref 18–48)
MAGNESIUM SERPL-MCNC: 1.8 MG/DL (ref 1.6–2.6)
MCH RBC QN AUTO: 29.1 PG (ref 27–31)
MCHC RBC AUTO-ENTMCNC: 31.9 G/DL (ref 32–36)
MCV RBC AUTO: 91 FL (ref 82–98)
MONOCYTES NFR BLD: 3 % (ref 4–15)
MRSA SCREEN BY PCR: NOT DETECTED
MV MEAN GRADIENT: 2 MMHG
MV PEAK A VEL: 0.94 M/S
MV PEAK E VEL: 0.95 M/S
MV PEAK GRADIENT: 5 MMHG
MV STENOSIS PRESSURE HALF TIME: 53 MS
MV VALVE AREA BY CONTINUITY EQUATION: 2.41 CM2
MV VALVE AREA P 1/2 METHOD: 4.15 CM2
MYCOPLASMA PNEUMONIAE: NOT DETECTED
NEUTROPHILS NFR BLD: 56 % (ref 38–73)
NEUTS BAND NFR BLD MANUAL: 36 %
NITRITE UR QL STRIP: NEGATIVE
NRBC BLD-RTO: 0 /100 WBC
OHS LV EJECTION FRACTION SIMPSONS BIPLANE MOD: 67 %
OHS QRS DURATION: 80 MS
OHS QTC CALCULATION: 430 MS
PH UR STRIP: 6 [PH] (ref 5–8)
PHOSPHATE SERPL-MCNC: 3 MG/DL (ref 2.7–4.5)
PLATELET # BLD AUTO: 307 K/UL (ref 150–450)
PMV BLD AUTO: 10.8 FL (ref 9.2–12.9)
POTASSIUM SERPL-SCNC: 4.6 MMOL/L (ref 3.5–5.1)
PROCALCITONIN SERPL IA-MCNC: 4.09 NG/ML (ref 0–0.5)
PROT SERPL-MCNC: 6.2 G/DL (ref 6–8.4)
PROT UR QL STRIP: NEGATIVE
PV MV: 0.84 M/S
PV PEAK GRADIENT: 6 MMHG
PV PEAK VELOCITY: 1.2 M/S
RA PRESSURE ESTIMATED: 3 MMHG
RBC # BLD AUTO: 4.09 M/UL (ref 4.6–6.2)
RESPIRATORY INFECTION PANEL SOURCE: NORMAL
RIGHT ATRIUM VOLUME AREA LENGTH APICAL 4 CHAMBER: 13.4 ML
RSV RNA NPH QL NAA+NON-PROBE: NOT DETECTED
RV TISSUE DOPPLER FREE WALL SYSTOLIC VELOCITY 1 (APICAL 4 CHAMBER VIEW): 9.9 CM/S
RV+EV RNA NPH QL NAA+NON-PROBE: NOT DETECTED
SARS-COV-2 RDRP RESP QL NAA+PROBE: NEGATIVE
SARS-COV-2 RNA RESP QL NAA+PROBE: NOT DETECTED
SODIUM SERPL-SCNC: 131 MMOL/L (ref 136–145)
SP GR UR STRIP: 1 (ref 1–1.03)
SPECIMEN SOURCE: NORMAL
TDI LATERAL: 0.13 M/S
TDI SEPTAL: 0.12 M/S
TDI: 0.13 M/S
TRICUSPID ANNULAR PLANE SYSTOLIC EXCURSION: 1.91 CM
TROPONIN I SERPL HS-MCNC: 61.7 PG/ML (ref 0–14.9)
TROPONIN I SERPL HS-MCNC: 8.6 PG/ML (ref 0–14.9)
URN SPEC COLLECT METH UR: NORMAL
UROBILINOGEN UR STRIP-ACNC: NEGATIVE EU/DL
WBC # BLD AUTO: 41.54 K/UL (ref 3.9–12.7)
Z-SCORE OF LEFT VENTRICULAR DIMENSION IN END DIASTOLE: -1.61
Z-SCORE OF LEFT VENTRICULAR DIMENSION IN END SYSTOLE: -1.47

## 2025-03-14 PROCEDURE — 21000000 HC CCU ICU ROOM CHARGE

## 2025-03-14 PROCEDURE — 25000003 PHARM REV CODE 250: Performed by: HOSPITALIST

## 2025-03-14 PROCEDURE — 94799 UNLISTED PULMONARY SVC/PX: CPT

## 2025-03-14 PROCEDURE — 84484 ASSAY OF TROPONIN QUANT: CPT | Performed by: EMERGENCY MEDICINE

## 2025-03-14 PROCEDURE — 94640 AIRWAY INHALATION TREATMENT: CPT

## 2025-03-14 PROCEDURE — 0202U NFCT DS 22 TRGT SARS-COV-2: CPT | Performed by: HOSPITALIST

## 2025-03-14 PROCEDURE — 63600175 PHARM REV CODE 636 W HCPCS: Performed by: HOSPITALIST

## 2025-03-14 PROCEDURE — 99900035 HC TECH TIME PER 15 MIN (STAT)

## 2025-03-14 PROCEDURE — 93306 TTE W/DOPPLER COMPLETE: CPT | Mod: 26,,, | Performed by: STUDENT IN AN ORGANIZED HEALTH CARE EDUCATION/TRAINING PROGRAM

## 2025-03-14 PROCEDURE — 96367 TX/PROPH/DG ADDL SEQ IV INF: CPT

## 2025-03-14 PROCEDURE — 63600175 PHARM REV CODE 636 W HCPCS: Performed by: STUDENT IN AN ORGANIZED HEALTH CARE EDUCATION/TRAINING PROGRAM

## 2025-03-14 PROCEDURE — 99900031 HC PATIENT EDUCATION (STAT)

## 2025-03-14 PROCEDURE — 93010 ELECTROCARDIOGRAM REPORT: CPT | Mod: ,,, | Performed by: INTERNAL MEDICINE

## 2025-03-14 PROCEDURE — 25500020 PHARM REV CODE 255: Performed by: HOSPITALIST

## 2025-03-14 PROCEDURE — 25000003 PHARM REV CODE 250: Performed by: STUDENT IN AN ORGANIZED HEALTH CARE EDUCATION/TRAINING PROGRAM

## 2025-03-14 PROCEDURE — 83605 ASSAY OF LACTIC ACID: CPT | Performed by: NURSE PRACTITIONER

## 2025-03-14 PROCEDURE — 25000242 PHARM REV CODE 250 ALT 637 W/ HCPCS: Performed by: HOSPITALIST

## 2025-03-14 PROCEDURE — 94664 DEMO&/EVAL PT USE INHALER: CPT

## 2025-03-14 PROCEDURE — 96375 TX/PRO/DX INJ NEW DRUG ADDON: CPT

## 2025-03-14 PROCEDURE — 80053 COMPREHEN METABOLIC PANEL: CPT | Performed by: EMERGENCY MEDICINE

## 2025-03-14 PROCEDURE — 84145 PROCALCITONIN (PCT): CPT | Performed by: HOSPITALIST

## 2025-03-14 PROCEDURE — 25000242 PHARM REV CODE 250 ALT 637 W/ HCPCS: Performed by: STUDENT IN AN ORGANIZED HEALTH CARE EDUCATION/TRAINING PROGRAM

## 2025-03-14 PROCEDURE — 36415 COLL VENOUS BLD VENIPUNCTURE: CPT | Performed by: NURSE PRACTITIONER

## 2025-03-14 PROCEDURE — 93306 TTE W/DOPPLER COMPLETE: CPT

## 2025-03-14 PROCEDURE — 84484 ASSAY OF TROPONIN QUANT: CPT | Mod: 91 | Performed by: HOSPITALIST

## 2025-03-14 PROCEDURE — 94761 N-INVAS EAR/PLS OXIMETRY MLT: CPT

## 2025-03-14 PROCEDURE — 96365 THER/PROPH/DIAG IV INF INIT: CPT

## 2025-03-14 PROCEDURE — 81003 URINALYSIS AUTO W/O SCOPE: CPT | Performed by: NURSE PRACTITIONER

## 2025-03-14 PROCEDURE — 36415 COLL VENOUS BLD VENIPUNCTURE: CPT | Performed by: EMERGENCY MEDICINE

## 2025-03-14 PROCEDURE — 87641 MR-STAPH DNA AMP PROBE: CPT | Performed by: HOSPITALIST

## 2025-03-14 PROCEDURE — 36415 COLL VENOUS BLD VENIPUNCTURE: CPT | Performed by: HOSPITALIST

## 2025-03-14 PROCEDURE — 83735 ASSAY OF MAGNESIUM: CPT | Performed by: EMERGENCY MEDICINE

## 2025-03-14 PROCEDURE — 84100 ASSAY OF PHOSPHORUS: CPT | Performed by: EMERGENCY MEDICINE

## 2025-03-14 PROCEDURE — 87040 BLOOD CULTURE FOR BACTERIA: CPT | Performed by: NURSE PRACTITIONER

## 2025-03-14 PROCEDURE — 27000221 HC OXYGEN, UP TO 24 HOURS

## 2025-03-14 PROCEDURE — 94640 AIRWAY INHALATION TREATMENT: CPT | Mod: XB

## 2025-03-14 PROCEDURE — 93005 ELECTROCARDIOGRAM TRACING: CPT | Performed by: INTERNAL MEDICINE

## 2025-03-14 RX ORDER — ENOXAPARIN SODIUM 100 MG/ML
40 INJECTION SUBCUTANEOUS EVERY 24 HOURS
Status: DISCONTINUED | OUTPATIENT
Start: 2025-03-14 | End: 2025-03-20 | Stop reason: HOSPADM

## 2025-03-14 RX ORDER — SODIUM CHLORIDE FOR INHALATION 3 %
4 VIAL, NEBULIZER (ML) INHALATION 2 TIMES DAILY
Status: DISCONTINUED | OUTPATIENT
Start: 2025-03-14 | End: 2025-03-20 | Stop reason: HOSPADM

## 2025-03-14 RX ORDER — PANTOPRAZOLE SODIUM 40 MG/1
40 TABLET, DELAYED RELEASE ORAL DAILY
Status: DISCONTINUED | OUTPATIENT
Start: 2025-03-14 | End: 2025-03-14

## 2025-03-14 RX ORDER — MORPHINE SULFATE 2 MG/ML
2 INJECTION, SOLUTION INTRAMUSCULAR; INTRAVENOUS EVERY 4 HOURS PRN
Refills: 0 | Status: DISCONTINUED | OUTPATIENT
Start: 2025-03-14 | End: 2025-03-20

## 2025-03-14 RX ORDER — ROFLUMILAST 500 UG/1
500 TABLET ORAL DAILY
Status: DISCONTINUED | OUTPATIENT
Start: 2025-03-14 | End: 2025-03-20

## 2025-03-14 RX ORDER — ARFORMOTEROL TARTRATE 15 UG/2ML
15 SOLUTION RESPIRATORY (INHALATION) 2 TIMES DAILY
Status: DISCONTINUED | OUTPATIENT
Start: 2025-03-14 | End: 2025-03-20 | Stop reason: HOSPADM

## 2025-03-14 RX ORDER — ONDANSETRON HYDROCHLORIDE 2 MG/ML
4 INJECTION, SOLUTION INTRAVENOUS EVERY 12 HOURS PRN
Status: DISCONTINUED | OUTPATIENT
Start: 2025-03-14 | End: 2025-03-20 | Stop reason: HOSPADM

## 2025-03-14 RX ORDER — IPRATROPIUM BROMIDE AND ALBUTEROL SULFATE 2.5; .5 MG/3ML; MG/3ML
3 SOLUTION RESPIRATORY (INHALATION)
Status: DISCONTINUED | OUTPATIENT
Start: 2025-03-14 | End: 2025-03-14

## 2025-03-14 RX ORDER — PANTOPRAZOLE SODIUM 40 MG/10ML
40 INJECTION, POWDER, LYOPHILIZED, FOR SOLUTION INTRAVENOUS
Status: COMPLETED | OUTPATIENT
Start: 2025-03-14 | End: 2025-03-14

## 2025-03-14 RX ORDER — PREDNISONE 20 MG/1
40 TABLET ORAL DAILY
Status: DISCONTINUED | OUTPATIENT
Start: 2025-03-14 | End: 2025-03-14

## 2025-03-14 RX ORDER — HYDROCODONE BITARTRATE AND ACETAMINOPHEN 5; 325 MG/1; MG/1
1 TABLET ORAL EVERY 6 HOURS PRN
Refills: 0 | Status: DISCONTINUED | OUTPATIENT
Start: 2025-03-14 | End: 2025-03-14

## 2025-03-14 RX ORDER — SODIUM CHLORIDE 9 MG/ML
INJECTION, SOLUTION INTRAVENOUS CONTINUOUS
Status: DISCONTINUED | OUTPATIENT
Start: 2025-03-14 | End: 2025-03-16

## 2025-03-14 RX ORDER — MORPHINE SULFATE 4 MG/ML
4 INJECTION, SOLUTION INTRAMUSCULAR; INTRAVENOUS
Refills: 0 | Status: COMPLETED | OUTPATIENT
Start: 2025-03-14 | End: 2025-03-14

## 2025-03-14 RX ORDER — SODIUM CHLORIDE 0.9 % (FLUSH) 0.9 %
3 SYRINGE (ML) INJECTION
Status: DISCONTINUED | OUTPATIENT
Start: 2025-03-14 | End: 2025-03-20 | Stop reason: HOSPADM

## 2025-03-14 RX ORDER — IPRATROPIUM BROMIDE AND ALBUTEROL SULFATE 2.5; .5 MG/3ML; MG/3ML
3 SOLUTION RESPIRATORY (INHALATION)
Status: DISCONTINUED | OUTPATIENT
Start: 2025-03-14 | End: 2025-03-20 | Stop reason: HOSPADM

## 2025-03-14 RX ORDER — ASPIRIN 325 MG
325 TABLET ORAL
Status: COMPLETED | OUTPATIENT
Start: 2025-03-14 | End: 2025-03-14

## 2025-03-14 RX ORDER — AMITRIPTYLINE HYDROCHLORIDE 25 MG/1
50 TABLET, FILM COATED ORAL NIGHTLY
Status: DISCONTINUED | OUTPATIENT
Start: 2025-03-14 | End: 2025-03-20 | Stop reason: HOSPADM

## 2025-03-14 RX ORDER — OXYCODONE HYDROCHLORIDE 10 MG/1
20 TABLET ORAL 3 TIMES DAILY PRN
Refills: 0 | Status: DISCONTINUED | OUTPATIENT
Start: 2025-03-14 | End: 2025-03-20 | Stop reason: HOSPADM

## 2025-03-14 RX ORDER — BUDESONIDE 0.5 MG/2ML
1 INHALANT ORAL EVERY 12 HOURS
Status: DISCONTINUED | OUTPATIENT
Start: 2025-03-14 | End: 2025-03-20

## 2025-03-14 RX ORDER — CEFEPIME HYDROCHLORIDE 2 G/1
2 INJECTION, POWDER, FOR SOLUTION INTRAVENOUS
Status: DISCONTINUED | OUTPATIENT
Start: 2025-03-14 | End: 2025-03-19

## 2025-03-14 RX ORDER — LEVALBUTEROL INHALATION SOLUTION 1.25 MG/3ML
1.25 SOLUTION RESPIRATORY (INHALATION)
Status: COMPLETED | OUTPATIENT
Start: 2025-03-14 | End: 2025-03-14

## 2025-03-14 RX ADMIN — METHYLPREDNISOLONE SODIUM SUCCINATE 80 MG: 40 INJECTION, POWDER, FOR SOLUTION INTRAMUSCULAR; INTRAVENOUS at 09:03

## 2025-03-14 RX ADMIN — ASPIRIN 325 MG ORAL TABLET 325 MG: 325 PILL ORAL at 08:03

## 2025-03-14 RX ADMIN — PANTOPRAZOLE SODIUM 40 MG: 40 INJECTION, POWDER, FOR SOLUTION INTRAVENOUS at 10:03

## 2025-03-14 RX ADMIN — IPRATROPIUM BROMIDE AND ALBUTEROL SULFATE 3 ML: .5; 3 SOLUTION RESPIRATORY (INHALATION) at 03:03

## 2025-03-14 RX ADMIN — LEVALBUTEROL HYDROCHLORIDE 1.25 MG: 1.25 SOLUTION RESPIRATORY (INHALATION) at 02:03

## 2025-03-14 RX ADMIN — AZITHROMYCIN MONOHYDRATE 500 MG: 500 INJECTION, POWDER, LYOPHILIZED, FOR SOLUTION INTRAVENOUS at 10:03

## 2025-03-14 RX ADMIN — SODIUM CHLORIDE SOLN NEBU 3% 4 ML: 3 NEBU SOLN at 11:03

## 2025-03-14 RX ADMIN — OXYCODONE HYDROCHLORIDE 20 MG: 10 TABLET ORAL at 10:03

## 2025-03-14 RX ADMIN — AMITRIPTYLINE HYDROCHLORIDE 50 MG: 25 TABLET, FILM COATED ORAL at 11:03

## 2025-03-14 RX ADMIN — OXYCODONE HYDROCHLORIDE 20 MG: 10 TABLET ORAL at 07:03

## 2025-03-14 RX ADMIN — BUDESONIDE INHALATION 1 MG: 0.5 SUSPENSION RESPIRATORY (INHALATION) at 01:03

## 2025-03-14 RX ADMIN — LEVOTHYROXINE SODIUM 125 MCG: 0.1 TABLET ORAL at 09:03

## 2025-03-14 RX ADMIN — ROFLUMILAST 500 MCG: 500 TABLET ORAL at 01:03

## 2025-03-14 RX ADMIN — IPRATROPIUM BROMIDE AND ALBUTEROL SULFATE 3 ML: .5; 3 SOLUTION RESPIRATORY (INHALATION) at 11:03

## 2025-03-14 RX ADMIN — CEFEPIME 2 G: 2 INJECTION, POWDER, FOR SOLUTION INTRAVENOUS at 05:03

## 2025-03-14 RX ADMIN — IPRATROPIUM BROMIDE AND ALBUTEROL SULFATE 3 ML: .5; 3 SOLUTION RESPIRATORY (INHALATION) at 09:03

## 2025-03-14 RX ADMIN — MORPHINE SULFATE 4 MG: 4 INJECTION INTRAVENOUS at 04:03

## 2025-03-14 RX ADMIN — ENOXAPARIN SODIUM 40 MG: 40 INJECTION SUBCUTANEOUS at 05:03

## 2025-03-14 RX ADMIN — VANCOMYCIN HYDROCHLORIDE 1250 MG: 1.25 INJECTION, POWDER, LYOPHILIZED, FOR SOLUTION INTRAVENOUS at 01:03

## 2025-03-14 RX ADMIN — ARFORMOTEROL TARTRATE 15 MCG: 15 SOLUTION RESPIRATORY (INHALATION) at 07:03

## 2025-03-14 RX ADMIN — ARFORMOTEROL TARTRATE 15 MCG: 15 SOLUTION RESPIRATORY (INHALATION) at 01:03

## 2025-03-14 RX ADMIN — SODIUM CHLORIDE SOLN NEBU 3% 4 ML: 3 NEBU SOLN at 07:03

## 2025-03-14 RX ADMIN — BUDESONIDE INHALATION 1 MG: 0.5 SUSPENSION RESPIRATORY (INHALATION) at 07:03

## 2025-03-14 RX ADMIN — CEFEPIME 2 G: 2 INJECTION, POWDER, FOR SOLUTION INTRAVENOUS at 09:03

## 2025-03-14 RX ADMIN — METHYLPREDNISOLONE SODIUM SUCCINATE 80 MG: 40 INJECTION, POWDER, FOR SOLUTION INTRAMUSCULAR; INTRAVENOUS at 01:03

## 2025-03-14 RX ADMIN — IOHEXOL 100 ML: 350 INJECTION, SOLUTION INTRAVENOUS at 01:03

## 2025-03-14 RX ADMIN — IPRATROPIUM BROMIDE AND ALBUTEROL SULFATE 3 ML: .5; 3 SOLUTION RESPIRATORY (INHALATION) at 07:03

## 2025-03-14 RX ADMIN — PIPERACILLIN AND TAZOBACTAM 4.5 G: 4; .5 INJECTION, POWDER, LYOPHILIZED, FOR SOLUTION INTRAVENOUS; PARENTERAL at 12:03

## 2025-03-14 RX ADMIN — SODIUM CHLORIDE: 9 INJECTION, SOLUTION INTRAVENOUS at 05:03

## 2025-03-14 NOTE — H&P
"  [facility]  History & Physical    Patient Name: Reji Romano  MRN: [id number]  Patient Class: IP- Inpatient  Admission Date: 3/13/2025  Attending Physician: Mor Alfaro MD  Primary Care Provider: Anna, Primary Doctor         Patient information was obtained from patient and ER records.     Subjective:     Principal Problem:<principal problem not specified>    Chief Complaint:   Chief Complaint   Patient presents with    Shortness of Breath     Patient O2 dependent of 2L/NC at home. Patient c/o increased shortness of breath    Altered Mental Status     Patient wife states that patient has not been acting like himself and has been "mariya out of it" x 2 days        HPI: This is a 70-year-old  male with a history of severe COPD, chronic hypoxic respiratory failure on 2 L nasal cannula 24/7, severe cachexia with a BMI 18.3,  throat cancer status post chemoradiation with chronic dysphagia on PEG tube feeding presenting here for worsened shortness of breaths for past few days.  Also reported may have altered mental status, however patient is awake alert when I evaluated him.  Patient has severe dysarthria secondary to previous throat cancer, history is obtained from wife at bedside.  Patient apparently has been feel unwell for few days, no sick contacts.  No chest pain.  No fever or chills.  Patient has PEG tube with every feeding/ medication through this.  Patient has is absolutely nothing by mouth.  Patient also has a chest vest on 3% normal saline nebulizer at home.   In the ER, patient is afebrile, hemodynamically stable.  WBC count is 40 K. patient did not received any steroids prior to this as per wife.     History reviewed. No pertinent past medical history.    History reviewed. No pertinent surgical history.    Review of patient's allergies indicates:   Allergen Reactions    Propoxyphene Anaphylaxis       No current facility-administered medications on file prior to " encounter.     Current Outpatient Medications on File Prior to Encounter   Medication Sig    albuterol (PROVENTIL) 2.5 mg /3 mL (0.083 %) nebulizer solution Take 2.5 mg by nebulization 2 (two) times a day.    albuterol (PROVENTIL/VENTOLIN HFA) 90 mcg/actuation inhaler Inhale 1-2 puffs into the lungs every 6 (six) hours as needed for Wheezing.    amitriptyline (ELAVIL) 50 MG tablet Take 50 mg by mouth every evening.    BREZTRI AEROSPHERE 160-9-4.8 mcg/actuation HFAA Inhale 2 puffs into the lungs 2 (two) times daily.    famotidine (PEPCID) 20 MG tablet Take 20 mg by mouth 2 (two) times daily.    FEROSUL 325 mg (65 mg iron) Tab tablet Take 325 mg by mouth every other day.    fluticasone propionate (FLONASE) 50 mcg/actuation nasal spray 1 spray by Each Nostril route once daily.    levothyroxine (SYNTHROID) 125 MCG tablet Take 125 mcg by mouth every evening.    oxyCODONE (ROXICODONE) 20 mg Tab immediate release tablet Take 1 tablet by mouth 3 (three) times daily as needed for Pain.    roflumilast (DALIRESP) 500 mcg Tab Take 500 mcg by mouth once daily.    sodium chloride 3% 3 % nebulizer solution Take 4 mLs by nebulization 2 (two) times a day.    azithromycin (ZITHROMAX) 500 MG tablet Take 500 mg by mouth every Mon, Wed, Fri.    hydrOXYzine HCL (ATARAX) 25 MG tablet Take 12.5 mg by mouth 3 (three) times daily as needed. (Patient not taking: Reported on 3/14/2025)    [DISCONTINUED] amLODIPine (NORVASC) 5 MG tablet Take 5 mg by mouth once daily.    [DISCONTINUED] oxyCODONE-acetaminophen (PERCOCET)  mg per tablet Take 1 tablet by mouth every 8 (eight) hours as needed for Pain.    [DISCONTINUED] tobramycin (BETHKIS) 300 mg/4 mL Nebu Inhale 300 mg into the lungs 2 (two) times a day. 1 month on and 1 month off     Family History    None       Tobacco Use    Smoking status: Former     Types: Cigarettes    Smokeless tobacco: Not on file   Substance and Sexual Activity    Alcohol use: Not Currently    Drug use: Not  Currently    Sexual activity: Not Currently     Review of Systems   Constitutional:  Positive for fatigue.   Respiratory:  Positive for shortness of breath.      Objective:     Vital Signs (Most Recent):  Temp: 97.8 °F (36.6 °C) (03/14/25 1503)  Pulse: (!) 123 (03/14/25 1400)  Resp: (!) 25 (03/14/25 1358)  BP: 123/88 (03/14/25 1400)  SpO2: 96 % (03/14/25 1358) Vital Signs (24h Range):  Temp:  [97.8 °F (36.6 °C)-98.3 °F (36.8 °C)] 97.8 °F (36.6 °C)  Pulse:  [] 123  Resp:  [17-26] 25  SpO2:  [90 %-100 %] 96 %  BP: ()/(54-88) 123/88     Weight: 49.9 kg (110 lb)  Body mass index is 18.3 kg/m².     Physical Exam  Vitals and nursing note reviewed.   Constitutional:       Appearance: He is ill-appearing.      Comments: Severe cachectic   Neck:      Vascular: No JVD.   Cardiovascular:      Rate and Rhythm: Normal rate and regular rhythm.      Heart sounds: Normal heart sounds.   Pulmonary:      Effort: Pulmonary effort is normal.      Comments: Very poor bilateral air entries  Abdominal:      General: Bowel sounds are normal. There is no distension.      Palpations: Abdomen is soft.      Tenderness: There is no abdominal tenderness.      Comments: PEG tube in place   Musculoskeletal:         General: No deformity.   Lymphadenopathy:      Cervical: No cervical adenopathy.   Skin:     Coloration: Skin is not pale.      Findings: No rash.                Significant Labs: All pertinent labs within the past 24 hours have been reviewed.  CBC:   Recent Labs   Lab 03/13/25  2342   WBC 41.54*   HGB 11.9*   HCT 37.3*        CMP:   Recent Labs   Lab 03/14/25  0443   *   K 4.6   CL 96   CO2 29   *   BUN 22   CREATININE 0.8   CALCIUM 8.6*   PROT 6.2   ALBUMIN 3.0*   BILITOT 0.4   ALKPHOS 90   AST 21   ALT 23   ANIONGAP 6*     Troponin:   Recent Labs   Lab 03/14/25  0018 03/14/25  1235   TROPONINIHS 61.7* 8.6       Significant Imaging: I have reviewed all pertinent imaging results/findings within the past  24 hours.  I have reviewed and interpreted all pertinent imaging results/findings within the past 24 hours.      Scheduled Meds:   albuterol-ipratropium  3 mL Nebulization Q4H WAKE    amitriptyline  50 mg Oral QHS    arformoteroL  15 mcg Nebulization BID    azithromycin  500 mg Intravenous Q24H    budesonide  1 mg Nebulization Q12H    ceFEPime IV (PEDS and ADULTS)  2 g Intravenous Q8H    enoxparin  40 mg Subcutaneous Daily    levothyroxine  125 mcg Oral QHS    methylPREDNISolone injection (PEDS and ADULTS)  80 mg Intravenous Q8H    roflumilast  500 mcg Oral Daily    sodium chloride 3%  4 mL Nebulization BID     Continuous Infusions:   0.9% NaCl   Intravenous Continuous         PRN Meds:.  Current Facility-Administered Medications:     morphine, 2 mg, Intravenous, Q4H PRN    ondansetron, 4 mg, Intravenous, Q12H PRN    oxyCODONE, 20 mg, Oral, TID PRN    sodium chloride 0.9%, 3 mL, Intravenous, PRN    Pharmacy to dose Vancomycin consult, , , Once **AND** vancomycin - pharmacy to dose, , Intravenous, pharmacy to manage frequency    CTA Chest Non-Coronary (PE Studies)  Result Date: 3/14/2025  CMS MANDATED QUALITY DATA - CT RADIATION - 436 All CT scans at this facility utilize dose modulation, iterative reconstruction, and/or weight based dosing when appropriate to reduce radiation dose to as low as reasonably achievable. CLINICAL HISTORY: ([id number])69 y/o  (1955) M Pulmonary embolism (PE) suspected, unknown D-dimer; TECHNIQUE: (A#[id number], exam time 3/14/2025 13:36) CTA CHEST NON CORONARY (PE STUDIES) CBD377 Axial CT examination of the chest with attention to the pulmonary arteries was performed using contiguous axial images from the diaphragms to the lung apices following the intravenous administration of non-ionic contrast material. Images were reviewed using lung, mediastinal, and bone windows. The study was performed with thin sections with subsequent 3-D reformats/MIP/reconstructions in multiple planes.  COMPARISON: Radiograph of the chest from 03/14/2025 CT of the chest from 06/04/2023. FINDINGS: Technically suboptimal exam secondary to motion artifact throughout. CTA PE evaluation: This is a moderate to high quality study for the evaluation of pulmonary embolism secondary to a combination of contrast bolus timing and motion artifact. The pulmonary arteries are normal in appearance without pulmonary emboli noted up to the segmental level, noting the limitations of CT technique for identifying small or isolated subsegmental emboli. CT Chest: Visualized neck: normal Lungs: Mild, upper lobe, centrilobular predominant emphysematous lung disease is present.  Mild biapical pleural thickening/scarring is present.  There is a 4 mm right lower lobe pulmonary nodule (image 179), and additional 4 mm left lower lobe pulmonary nodule (image 255).  Additional smaller punctate nodules may be present, difficult to assess secondary to the underlying motion artifact. Airway: Moderate cylindrical bronchiectasis is present with a dependent lower lobe predominance.  There is a small amount heterogeneous foamy debris in the dependent trachea and mainstem bronchi suggestive of mucous, secretions and/or aspiration. Pleura: There is no pleural effusion. There is no pneumothorax. Cardiovascular: The heart is normal in size. There are no findings of right heart failure.  Calcified plaque is seen at the aortic arch and in the LAD distribution of the coronary arteries. Mediastinum: No lymphadenopathy is seen. Soft tissues: normal Musculoskeletal: There are mild degenerative changes of the thoracic spine.  There are no suspicious osseous lesions. Esophagus: normal Upper Abdomen: No acute abnormality is seen in the upper abdomen.  There is a PEG tube with balloon inflated in the body of the stomach.     1. No evidence of pulmonary embolism to the segmental arterial level. If there is continued clinical concern, consider further evaluation with  lower extremity doppler. 2. Mild bronchiectasis with a central hilar lower lobe predominance. 3.  Minimal scattered punctate nodules present, partially obscured by motion artifact suggesting either minimal infection, atelectasis or scarring.  The largest measurable nodule is 4 mm in diameter, and is unchanged from the previous CT, for which no additional follow-up will be warranted. Electronically signed by: Ashish Sauceda Date:    03/14/2025 Time:    13:41    X-Ray Chest 1 View  Result Date: 3/14/2025  EXAMINATION: XR CHEST 1 VIEW CLINICAL HISTORY: Sepsis; TECHNIQUE: Single frontal view of the chest was performed. COMPARISON: 06/12/2023 FINDINGS: See below     Small bilateral pleural effusions with adjacent airspace disease or atelectasis, left greater than right.  Background of chronic interstitial changes.  Normal heart size. Electronically signed by: Giselle Hou Date:    03/14/2025 Time:    00:45  - pulls last radiology orders    Assessment/Plan:     No notes have been filed under this hospital service.  Service: Hospital Medicine    VTE Risk Mitigation (From admission, onward)           Ordered     enoxaparin injection 40 mg  Daily         03/14/25 0803     IP VTE HIGH RISK PATIENT  Once         03/14/25 0803     Place sequential compression device  Until discontinued         03/14/25 0803                               Automatic Inhaler to Nebulizer Interchange    budesonide/glycopyrrolate/formoterol (Breztri) 640 mcg/ 36 mcg/ 19.2 mcg changed to budesonide 1 mg twice daily AND ipratropium 0.5 mg every 6 hour scheduled AND arformoterol 15 mcg twice daily per [facility] Automatic Therapeutic Substitutions Protocol.    Please contact pharmacy at extension [phone] with any questions.     Thank you,   Katrin Alfaro MD  Department of Hospital Medicine  [facility] hospital

## 2025-03-14 NOTE — CARE UPDATE
CT chest reviewed, no significant pneumonia.  Pt has severe leucocytosis with elevated procalcitonin.  Clinical pic is consistent with severe sepsis.       Plan :  Check ua.   Check ct abd/pelvis with iv contrast and oral contrast to look for any intraabdominal infection.   Continue broad spectrum iv abx .

## 2025-03-14 NOTE — SUBJECTIVE & OBJECTIVE
History reviewed. No pertinent past medical history.    History reviewed. No pertinent surgical history.    Review of patient's allergies indicates:   Allergen Reactions    Propoxyphene Anaphylaxis       No current facility-administered medications on file prior to encounter.     Current Outpatient Medications on File Prior to Encounter   Medication Sig    albuterol (PROVENTIL) 2.5 mg /3 mL (0.083 %) nebulizer solution Take 2.5 mg by nebulization 2 (two) times a day.    albuterol (PROVENTIL/VENTOLIN HFA) 90 mcg/actuation inhaler Inhale 1-2 puffs into the lungs every 6 (six) hours as needed for Wheezing.    amitriptyline (ELAVIL) 50 MG tablet Take 50 mg by mouth every evening.    BREZTRI AEROSPHERE 160-9-4.8 mcg/actuation HFAA Inhale 2 puffs into the lungs 2 (two) times daily.    famotidine (PEPCID) 20 MG tablet Take 20 mg by mouth 2 (two) times daily.    FEROSUL 325 mg (65 mg iron) Tab tablet Take 325 mg by mouth every other day.    fluticasone propionate (FLONASE) 50 mcg/actuation nasal spray 1 spray by Each Nostril route once daily.    levothyroxine (SYNTHROID) 125 MCG tablet Take 125 mcg by mouth every evening.    oxyCODONE (ROXICODONE) 20 mg Tab immediate release tablet Take 1 tablet by mouth 3 (three) times daily as needed for Pain.    roflumilast (DALIRESP) 500 mcg Tab Take 500 mcg by mouth once daily.    sodium chloride 3% 3 % nebulizer solution Take 4 mLs by nebulization 2 (two) times a day.    azithromycin (ZITHROMAX) 500 MG tablet Take 500 mg by mouth every Mon, Wed, Fri.    hydrOXYzine HCL (ATARAX) 25 MG tablet Take 12.5 mg by mouth 3 (three) times daily as needed. (Patient not taking: Reported on 3/14/2025)    [DISCONTINUED] amLODIPine (NORVASC) 5 MG tablet Take 5 mg by mouth once daily.    [DISCONTINUED] oxyCODONE-acetaminophen (PERCOCET)  mg per tablet Take 1 tablet by mouth every 8 (eight) hours as needed for Pain.    [DISCONTINUED] tobramycin (BETHKIS) 300 mg/4 mL Nebu Inhale 300 mg into the  lungs 2 (two) times a day. 1 month on and 1 month off     Family History    None       Tobacco Use    Smoking status: Former     Types: Cigarettes    Smokeless tobacco: Not on file   Substance and Sexual Activity    Alcohol use: Not Currently    Drug use: Not Currently    Sexual activity: Not Currently     Review of Systems   Respiratory:  Positive for shortness of breath.      Objective:     Vital Signs (Most Recent):  Temp: 98.3 °F (36.8 °C) (03/13/25 2317)  Pulse: (!) 111 (03/14/25 1000)  Resp: 18 (03/14/25 1046)  BP: 138/75 (03/14/25 1000)  SpO2: (!) 93 % (03/14/25 1000) Vital Signs (24h Range):  Temp:  [98.3 °F (36.8 °C)] 98.3 °F (36.8 °C)  Pulse:  [] 111  Resp:  [17-26] 18  SpO2:  [91 %-100 %] 93 %  BP: ()/(54-78) 138/75     Weight: 49.9 kg (110 lb)  Body mass index is 18.3 kg/m².     Physical Exam  Vitals and nursing note reviewed.   Constitutional:       Appearance: He is ill-appearing.      Comments: Severe cachectic   Neck:      Vascular: No JVD.   Cardiovascular:      Rate and Rhythm: Normal rate and regular rhythm.      Heart sounds: Normal heart sounds.   Pulmonary:      Effort: Pulmonary effort is normal.      Comments: Very poor bilateral air entries  Abdominal:      General: Bowel sounds are normal. There is no distension.      Palpations: Abdomen is soft.      Tenderness: There is no abdominal tenderness.      Comments: PEG tube in place   Musculoskeletal:         General: No deformity.   Lymphadenopathy:      Cervical: No cervical adenopathy.   Skin:     Coloration: Skin is not pale.      Findings: No rash.                Significant Labs: All pertinent labs within the past 24 hours have been reviewed.  CBC:   Recent Labs   Lab 03/13/25  2342   WBC 41.54*   HGB 11.9*   HCT 37.3*        CMP:   Recent Labs   Lab 03/14/25  0443   *   K 4.6   CL 96   CO2 29   *   BUN 22   CREATININE 0.8   CALCIUM 8.6*   PROT 6.2   ALBUMIN 3.0*   BILITOT 0.4   ALKPHOS 90   AST 21   ALT 23    ANIONGAP 6*       Significant Imaging: I have reviewed all pertinent imaging results/findings within the past 24 hours.  I have reviewed and interpreted all pertinent imaging results/findings within the past 24 hours.    Scheduled Meds:   albuterol-ipratropium  3 mL Nebulization Q4H WAKE    amitriptyline  50 mg Oral QHS    azithromycin  500 mg Intravenous Q24H    budesonide-glycopyr-formoterol  2 puff Inhalation BID    ceFEPime IV (PEDS and ADULTS)  2 g Intravenous Q8H    enoxparin  40 mg Subcutaneous Daily    levothyroxine  125 mcg Oral QHS    methylPREDNISolone injection (PEDS and ADULTS)  80 mg Intravenous Q8H    roflumilast  500 mcg Oral Daily    sodium chloride 3%  4 mL Nebulization BID     Continuous Infusions:  PRN Meds:.  Current Facility-Administered Medications:     morphine, 2 mg, Intravenous, Q4H PRN    ondansetron, 4 mg, Intravenous, Q12H PRN    oxyCODONE, 20 mg, Oral, TID PRN    sodium chloride 0.9%, 3 mL, Intravenous, PRN    Pharmacy to dose Vancomycin consult, , , Once **AND** vancomycin - pharmacy to dose, , Intravenous, pharmacy to manage frequency    X-Ray Chest 1 View  Result Date: 3/14/2025  EXAMINATION: XR CHEST 1 VIEW CLINICAL HISTORY: Sepsis; TECHNIQUE: Single frontal view of the chest was performed. COMPARISON: 06/12/2023 FINDINGS: See below     Small bilateral pleural effusions with adjacent airspace disease or atelectasis, left greater than right.  Background of chronic interstitial changes.  Normal heart size. Electronically signed by: Giselle Hou Date:    03/14/2025 Time:    00:45  - pulls last radiology orders

## 2025-03-14 NOTE — ASSESSMENT & PLAN NOTE
Patient's COPD is with exacerbation noted by continued dyspnea and use of accessory muscles for breathing currently.  Patient is currently on COPD Pathway. Continue scheduled inhalers Steroids, Antibiotics, and Supplemental oxygen and monitor respiratory status closely.       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   Get CT of chest.

## 2025-03-14 NOTE — ASSESSMENT & PLAN NOTE
Nutrition consulted. Most recent weight and BMI monitored-     Measurements:  Wt Readings from Last 1 Encounters:   03/13/25 49.9 kg (110 lb)   Body mass index is 18.3 kg/m².    Patient has been screened and assessed by RD.    Malnutrition Type:  Context:    Level:      Malnutrition Characteristic Summary:       Interventions/Recommendations (treatment strategy):

## 2025-03-14 NOTE — ASSESSMENT & PLAN NOTE
Aware, patient has chronic PEG tube feeding.  Nothing by mouth strictly.   Consult dietitian for tube feeding.   Patient has chronic dysphagia/ dysarthria.

## 2025-03-14 NOTE — CONSULTS
[facility]  Adult Nutrition   Consult Note (Nutrition Support Management)    SUMMARY     Recommendations  1.) Will begin Glucerna 1.5-5 cartons daily to provide 1780 kcals, 100gm protein and 900mL of free water.   Suggest FWF 100mL after each feed.   TF to meet 100% EEN and EPN.   2.) If kitchen out of Glucerna, suggest Diabetisource-6 cans daily to provide 1800 kcals, 90gm protein and 1224mL of free water.  Suggest FWF 80mL after each feed.   TF to meet 100% EEN and EPN.   3.) RD to perform NFPE at follow up.  Nutrition Goal Status: new    Nutrition Goals: Initiate nutrition support as medically feasible by RD follow up., Lab values to trend toward normal range by RD follow up., and Provide >50% nutritional needs via nutrition support by RD follow up.    Nutrition Interventions: Enteral Nutrition Management and Collaboration and Referral of Nutrition Care    Nutrition Diagnosis PES Statement: Inadequate EN infusion related to inadequate rate as evidenced by no infusion at this time meeting <50% of estimated nutritional needs.       Nutrition Diagnosis Status:   New    Dietitian Rounds Brief  Consult received for tube feeding. 70-year-old  male with a history of severe COPD, chronic hypoxic respiratory failure on 2 L nasal cannula 24/7, severe cachexia with a BMI 18.3,  throat cancer status post chemoradiation with chronic dysphagia on PEG tube feeding presenting here for worsened shortness of breaths for past few days. RD covering remotely. Attempted to call wife with no answer to discuss home feeds. Per EMR, pt tolerated Glucerna 1.5-4 cartons daily. No GI distress. LBM PTA. Skin: intact per chart. UBW 49.9kg (9/2024), CBW 49.9kg reflecting wt maintanence. NFPE to be complete by on-site RD d/t risk of malnutrition.    Nutrition Related Social Determinants of Health:   Food Insecurity: Patient Declined (9/5/2024)    Received from [facility]    Hunger Vital Sign     Worried About Running Out  "of Food in the Last Year: Patient declined     Ran Out of Food in the Last Year: Patient declined     Malnutrition Assessment  RD covering remotely. RD to assess at follow up.   Skin (Micronutrient): none       Diet order:   NPO    Evaluation of Received Nutrient/Fluid Intake  Energy Calories Required: not meeting needs  Protein Required: not meeting needs  Fluid Required: meeting needs  Tolerance: other (see comments) (NPO)     % Intake of Estimated Energy Needs: 0%  % Meal Intake: NPO      Intake/Output Summary (Last 24 hours) at 3/14/2025 1628  Last data filed at 3/14/2025 1135  Gross per 24 hour   Intake 2097.05 ml   Output --   Net 2097.05 ml        Anthropometrics  Height: 5' 5" (165.1 cm)  Height (inches): 65 in  Height Method: Stated  Weight: 49.9 kg (110 lb)  Weight (lb): 110 lb  Weight Method: Stated  Ideal Body Weight (IBW), Male: 136 lb  % Ideal Body Weight, Male (lb): 80.88 %  BMI (Calculated): 18.3  BMI Grade: less than 23 (older than 65 years) - underweight, less than 18.5 - underweight  Usual Body Weight (UBW), k.9 kg (2024)  % Usual Body Weight: 100.2       Estimated/Assessed Needs  Weight Used For Calorie Calculations: 49.9 kg (110 lb 0.2 oz)  Energy Calorie Requirements (kcal): 5352-6102  Energy Need Method: Kcal/kg (30-35)  Protein Requirements:  (1.5-2.0)  Weight Used For Protein Calculations: 49.9 kg (110 lb 0.2 oz)  Fluid Requirements (mL): 2963-6451     RDA Method (mL): 1497  CHO Requirement: 168gm CHO per day    Reason for Assessment  Reason For Assessment: consult, new tube feeding    Final diagnoses:  [J44.1] COPD exacerbation (Primary)  [A41.9] Sepsis, due to unspecified organism, unspecified whether acute organ dysfunction present  [I95.9] Hypotension, unspecified hypotension type  [E87.20] Lactic acidosis  [J96.21] Acute on chronic respiratory failure with hypoxia  [R06.02] Shortness of breath     History reviewed. No pertinent past medical history.     Nutrition/Diet " History  Nutrition Intake History: Unable to assess  Spiritual, Cultural Beliefs, Catholic Practices, Values that Affect Care: no  Food Allergies: NKFA  Factors Affecting Nutritional Intake: NPO    Weight History:  Wt Readings from Last 10 Encounters:   03/13/25 49.9 kg (110 lb)   03/04/24 54 kg (119 lb)   06/04/23 57.6 kg (126 lb 15.8 oz)        Lab/Procedures/Meds: Pertinent Labs/Meds Reviewed    Medications:Pertinent Medications Reviewed  Scheduled Meds:   albuterol-ipratropium  3 mL Nebulization Q4H WAKE    amitriptyline  50 mg Oral QHS    arformoteroL  15 mcg Nebulization BID    azithromycin  500 mg Intravenous Q24H    budesonide  1 mg Nebulization Q12H    ceFEPime IV (PEDS and ADULTS)  2 g Intravenous Q8H    enoxparin  40 mg Subcutaneous Daily    levothyroxine  125 mcg Oral QHS    methylPREDNISolone injection (PEDS and ADULTS)  80 mg Intravenous Q8H    roflumilast  500 mcg Oral Daily    sodium chloride 3%  4 mL Nebulization BID     Continuous Infusions:   0.9% NaCl   Intravenous Continuous         PRN Meds:.  Current Facility-Administered Medications:     morphine, 2 mg, Intravenous, Q4H PRN    ondansetron, 4 mg, Intravenous, Q12H PRN    oxyCODONE, 20 mg, Oral, TID PRN    sodium chloride 0.9%, 3 mL, Intravenous, PRN    Pharmacy to dose Vancomycin consult, , , Once **AND** vancomycin - pharmacy to dose, , Intravenous, pharmacy to manage frequency    Labs: Pertinent Labs Reviewed  Clinical Chemistry:  Recent Labs   Lab 03/14/25  0443   *   K 4.6   CL 96   CO2 29   *   BUN 22   CREATININE 0.8   CALCIUM 8.6*   PROT 6.2   ALBUMIN 3.0*   BILITOT 0.4   ALKPHOS 90   AST 21   ALT 23   ANIONGAP 6*   MG 1.8   PHOS 3.0     CBC:   Recent Labs   Lab 03/13/25  2342   WBC 41.54*   RBC 4.09*   HGB 11.9*   HCT 37.3*      MCV 91   MCH 29.1   MCHC 31.9*     Cardiac Profile:  Recent Labs   Lab 03/13/25  2342   BNP 99       Monitor and Evaluation  Monitor and Evaluation: Energy intake, Enteral and parenteral  nutrition administration, Weight, Electrolyte and renal panel, Gastrointestinal profile, Glucose/endocrine profile, Inflammatory profile, Nutrition focused physical findings     Discharge Planning  Nutrition Discharge Planning: Enteral nutrition (comments)    Nutrition Risk  Level of Risk/Frequency of Follow-up:  (2x/week)     Nutrition Follow-Up  RD Follow-up?: Yes    Claudette Louise RD 03/14/2025 4:28 PM

## 2025-03-14 NOTE — ED PROVIDER NOTES
"Encounter Date: 3/13/2025       History     Chief Complaint   Patient presents with    Shortness of Breath     Patient O2 dependent of 2L/NC at home. Patient c/o increased shortness of breath    Altered Mental Status     Patient wife states that patient has not been acting like himself and has been "mariya out of it" x 2 days     HPI    Reji Romano is a 70 y.o. 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  Wife is concerned that he has been kind of out of it.  Having a productive cough.  Denies chest pain, fevers, decreased p.o. intake, numbness, and weakness.    Review of patient's allergies indicates:   Allergen Reactions    Propoxyphene Anaphylaxis     History reviewed. No pertinent past medical history.  History reviewed. No pertinent surgical history.  No family history on file.  Social History[1]  Review of Systems   Constitutional:  Negative for fever.   HENT:  Negative for sore throat.    Respiratory:  Positive for cough and shortness of breath.    Cardiovascular:  Negative for chest pain.   Gastrointestinal:  Negative for abdominal pain, diarrhea, nausea and vomiting.   Genitourinary:  Negative for dysuria, frequency and hematuria.   Musculoskeletal:  Negative for back pain.   Skin:  Negative for rash.   Neurological:  Negative for weakness.   Hematological:  Does not bruise/bleed easily.       Physical Exam     Initial Vitals [03/13/25 2317]   BP Pulse Resp Temp SpO2   (!) 88/55 (!) 122 (!) 24 98.3 °F (36.8 °C) (!) 91 %      MAP       --         Physical Exam    Nursing note and vitals reviewed.  Constitutional: He appears well-developed and well-nourished.   HENT:   Head: Normocephalic and atraumatic.   Eyes: EOM are " normal. Pupils are equal, round, and reactive to light.   Neck:   Normal range of motion.  Cardiovascular:  Normal rate, regular rhythm and normal heart sounds.           Pulmonary/Chest: He is in respiratory distress. He has wheezes. He has rhonchi.   Tachypnea with accessory muscle use.   Abdominal: Abdomen is soft. He exhibits no distension. There is no abdominal tenderness.   PEG tube in place There is no rebound.   Musculoskeletal:         General: Normal range of motion.      Cervical back: Normal range of motion.     Neurological: He is alert and oriented to person, place, and time. He has normal strength. No sensory deficit. GCS score is 15. GCS eye subscore is 4. GCS verbal subscore is 5. GCS motor subscore is 6.   Garbled speech, but appears to be answering questions appropriately   Skin: Capillary refill takes less than 2 seconds.   Psychiatric: He has a normal mood and affect.         ED Course   Critical Care    Date/Time: 3/14/2025 6:17 AM    Performed by: Luis Crow MD  Authorized by: Luis Crow MD  Direct patient critical care time: 10 minutes  Ordering / reviewing critical care time: 10 minutes  Documentation critical care time: 10 minutes  Consulting other physicians critical care time: 5 minutes  Total critical care time (exclusive of procedural time) : 35 minutes  Critical care was necessary to treat or prevent imminent or life-threatening deterioration of the following conditions: respiratory failure and sepsis.  Critical care was time spent personally by me on the following activities: discussions with consultants, interpretation of cardiac output measurements, evaluation of patient's response to treatment, obtaining history from patient or surrogate, examination of patient, ordering and performing treatments and interventions, ordering and review of laboratory studies, ordering and review of radiographic studies, pulse oximetry, re-evaluation of patient's condition  and review of old charts.        Labs Reviewed   CBC W/ AUTO DIFFERENTIAL - Abnormal       Result Value    WBC 41.54 (*)     RBC 4.09 (*)     Hemoglobin 11.9 (*)     Hematocrit 37.3 (*)     MCV 91      MCH 29.1      MCHC 31.9 (*)     RDW 13.7      Platelets 307      MPV 10.8      Immature Granulocytes CANCELED      Immature Grans (Abs) CANCELED      nRBC 0      Gran % 56.0      Lymph % 5.0 (*)     Mono % 3.0 (*)     Eosinophil % 0.0      Basophil % 0.0      Bands 36.0      Aniso Slight      Differential Method Manual      Narrative:     Release to patient->Immediate   WBCs critical result(s) repeated. Called and verbal readback   obtained from Joanna Nava Rn by AS2 03/14/2025 00:39   TROPONIN I HIGH SENSITIVITY - Abnormal    Troponin I High Sensitivity 61.7 (*)    COMPREHENSIVE METABOLIC PANEL - Abnormal    Sodium 131 (*)     Potassium 4.6      Chloride 96      CO2 29      Glucose 142 (*)     BUN 22      Creatinine 0.8      Calcium 8.6 (*)     Total Protein 6.2      Albumin 3.0 (*)     Total Bilirubin 0.4      Alkaline Phosphatase 90      AST 21      ALT 23      eGFR >60.0      Anion Gap 6 (*)    ISTAT LACTATE - Abnormal    POC Lactate 2.31 (*)     Sample VENOUS     CULTURE, BLOOD   CULTURE, BLOOD   URINALYSIS, REFLEX TO URINE CULTURE    Specimen UA Urine, Clean Catch      Color, UA Yellow      Appearance, UA Clear      pH, UA 6.0      Specific Gravity, UA 1.005      Protein, UA Negative      Glucose, UA Negative      Ketones, UA Negative      Bilirubin (UA) Negative      Occult Blood UA Negative      Nitrite, UA Negative      Urobilinogen, UA Negative      Leukocytes, UA Negative      Narrative:     Specimen Source->Urine   TROPONIN I HIGH SENSITIVITY   B-TYPE NATRIURETIC PEPTIDE    BNP 99      Narrative:     Release to patient->Immediate   SARS-COV-2 RNA AMPLIFICATION, QUAL    SARS-CoV-2 RNA, Amplification, Qual Negative     INFLUENZA A AND B ANTIGEN    Influenza A, Molecular Negative      Influenza B, Molecular  Negative      Flu A & B Source Nasal swab      Narrative:     Specimen Source->Nasopharyngeal Swab   PHOSPHORUS    Phosphorus 3.0     MAGNESIUM    Magnesium 1.8     LACTIC ACID, PLASMA    Lactate (Lactic Acid) 1.5     HEPATITIS C ANTIBODY   HIV 1 / 2 ANTIBODY   POCT LACTATE          Imaging Results              X-Ray Chest 1 View (Final result)  Result time 03/14/25 00:45:42      Final result by Giselle Hou MD (03/14/25 00:45:42)                   Impression:      Small bilateral pleural effusions with adjacent airspace disease or atelectasis, left greater than right.  Background of chronic interstitial changes.  Normal heart size.      Electronically signed by: Giselle Hou  Date:    03/14/2025  Time:    00:45               Narrative:    EXAMINATION:  XR CHEST 1 VIEW    CLINICAL HISTORY:  Sepsis;    TECHNIQUE:  Single frontal view of the chest was performed.    COMPARISON:  06/12/2023    FINDINGS:  See below                                    X-Rays:   Independently Interpreted Readings:   Other Readings:  Small bilateral pleural effusions.  Left-greater-than-right.  Chronic interstitial changes.    Medications   aspirin tablet 325 mg (0 mg Oral Hold 3/14/25 0200)   levalbuterol nebulizer solution 1.25 mg (1.25 mg Nebulization Given 3/13/25 2352)   methylPREDNISolone sodium succinate injection 125 mg (125 mg Intravenous Given 3/13/25 2353)   sodium chloride 0.9% bolus 1,497 mL 1,497 mL (0 mLs Intravenous Stopped 3/14/25 0044)   piperacillin-tazobactam (ZOSYN) 4.5 g in D5W 100 mL IVPB (MB+) (0 g Intravenous Stopped 3/14/25 0039)   vancomycin 1,250 mg in 0.9% NaCl 250 mL IVPB (admixture device) (0 mg Intravenous Stopped 3/14/25 0231)   levalbuterol nebulizer solution 1.25 mg (1.25 mg Nebulization Given 3/14/25 0257)   morphine injection 4 mg (4 mg Intravenous Given 3/14/25 3199)     Medical Decision Making  Reji Romano is a 70 y.o. 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  Rate is 122, respiratory rate is 24, and BP is 88/55.  O2 sat is 91% on 2 L nasal cannula.  Did begin a desat and ultimately required 6 L. wheezing throughout.  PEG tube in place.  Garbled speech.  Suspicion for sepsis secondary to respiratory source with overlying COPD exacerbation.  Blood cultures drawn.  Lactic of 2.31.  White count of 41.5.  Given 30 cc/kg of fluid as well as broad-spectrum antibiotics.  Troponin elevated at 61.7.  EKG with sinus tachycardia at a rate of 101 without acute ST segment or T-wave changes.  CMP with minor hyponatremia but otherwise unremarkable.  UA is unremarkable.  Viral swabs negative.  Patient's vitals have largely normalized.  Given Solu-Medrol and 2 doses of Xopenex.  Patient has chronic cancer-related pain, so given morphine.  We will admit to Hospital Medicine for IV antibiotics and further management of COPD exacerbation.    Amount and/or Complexity of Data Reviewed  Labs: ordered.    Risk  OTC drugs.  Prescription drug management.  Decision regarding hospitalization.               ED Course as of 03/14/25 0620   Fri Mar 14, 2025   0618 This patient does have evidence of infective focus  My overall impression is sepsis.  Source: Respiratory  Antibiotics given- Antibiotics (72h ago, onward)    None      Latest lactate reviewed-  @[id number](lactate,poclac)@  Organ dysfunction indicated by Acute respiratory failure    Fluid challenge Ideal Body Weight- The patient's ideal body weight is [unfilled] which will be used to calculate fluid bolus of 30 ml/kg for treatment of septic shock.      Post- resuscitation assessment Yes - I attest a sepsis perfusion exam was performed within 6 hours of sepsis, severe sepsis, or  septic shock presentation, following fluid resuscitation.      Will Not start Pressors- Levophed for MAP of 65  Source control achieved by: abx     [[place]]      ED Course User Index  [[place]] Luis Crow MD                           Clinical Impression:  Final diagnoses:  [J44.1] COPD exacerbation (Primary)  [A41.9] Sepsis, due to unspecified organism, unspecified whether acute organ dysfunction present  [I95.9] Hypotension, unspecified hypotension type  [E87.20] Lactic acidosis  [J96.21] Acute on chronic respiratory failure with hypoxia  [R06.02] Shortness of breath          ED Disposition Condition    Admit Stable                  Luis Crow MD  03/14/25 0624         [1]   Social History  Tobacco Use    Smoking status: Former     Types: Cigarettes   Substance Use Topics    Alcohol use: Not Currently    Drug use: Not Currently        Luis Crow MD  03/14/25 0639

## 2025-03-14 NOTE — PROGRESS NOTES
Formerly [facility] - Emergency Dept  Hospital Medicine  Progress Note    Patient Name: Reji Romano  MRN: [id number]  Patient Class: OP- Observation   Admission Date: 3/13/2025  Length of Stay: 0 days  Attending Physician: Mor Alfaro MD  Primary Care Provider: No, Primary Doctor        Subjective     Principal Problem:<principal problem not specified>        HPI:  This is a 70-year-old  male with a history of severe COPD, chronic hypoxic respiratory failure on 2 L nasal cannula 24/7, severe cachexia with a BMI 18.3,  throat cancer status post chemoradiation with chronic dysphagia on PEG tube feeding presenting here for worsened shortness of breaths for past few days.  Also reported may have altered mental status, however patient is awake alert when I evaluated him.  Patient has severe dysarthria secondary to previous throat cancer, history is obtained from wife at bedside.  Patient apparently has been feel unwell for few days, no sick contacts.  No chest pain.  No fever or chills.  Patient has PEG tube with every feeding/ medication through this.  Patient has is absolutely nothing by mouth.  Patient also has a chest vest on 3% normal saline nebulizer at home.   In the ER, patient is afebrile, hemodynamically stable.  WBC count is 40 K. patient did not received any steroids prior to this as per wife.     Overview/Hospital Course:  No notes on file    History reviewed. No pertinent past medical history.    History reviewed. No pertinent surgical history.    Review of patient's allergies indicates:   Allergen Reactions    Propoxyphene Anaphylaxis       No current facility-administered medications on file prior to encounter.     Current Outpatient Medications on File Prior to Encounter   Medication Sig    albuterol (PROVENTIL) 2.5 mg /3 mL (0.083 %) nebulizer solution Take 2.5 mg by nebulization 2 (two) times a day.    albuterol (PROVENTIL/VENTOLIN HFA) 90 mcg/actuation inhaler Inhale 1-2 puffs into  the lungs every 6 (six) hours as needed for Wheezing.    amitriptyline (ELAVIL) 50 MG tablet Take 50 mg by mouth every evening.    BREZTRI AEROSPHERE 160-9-4.8 mcg/actuation HFAA Inhale 2 puffs into the lungs 2 (two) times daily.    famotidine (PEPCID) 20 MG tablet Take 20 mg by mouth 2 (two) times daily.    FEROSUL 325 mg (65 mg iron) Tab tablet Take 325 mg by mouth every other day.    fluticasone propionate (FLONASE) 50 mcg/actuation nasal spray 1 spray by Each Nostril route once daily.    levothyroxine (SYNTHROID) 125 MCG tablet Take 125 mcg by mouth every evening.    oxyCODONE (ROXICODONE) 20 mg Tab immediate release tablet Take 1 tablet by mouth 3 (three) times daily as needed for Pain.    roflumilast (DALIRESP) 500 mcg Tab Take 500 mcg by mouth once daily.    sodium chloride 3% 3 % nebulizer solution Take 4 mLs by nebulization 2 (two) times a day.    azithromycin (ZITHROMAX) 500 MG tablet Take 500 mg by mouth every Mon, Wed, Fri.    hydrOXYzine HCL (ATARAX) 25 MG tablet Take 12.5 mg by mouth 3 (three) times daily as needed. (Patient not taking: Reported on 3/14/2025)    [DISCONTINUED] amLODIPine (NORVASC) 5 MG tablet Take 5 mg by mouth once daily.    [DISCONTINUED] oxyCODONE-acetaminophen (PERCOCET)  mg per tablet Take 1 tablet by mouth every 8 (eight) hours as needed for Pain.    [DISCONTINUED] tobramycin (BETHKIS) 300 mg/4 mL Nebu Inhale 300 mg into the lungs 2 (two) times a day. 1 month on and 1 month off     Family History    None       Tobacco Use    Smoking status: Former     Types: Cigarettes    Smokeless tobacco: Not on file   Substance and Sexual Activity    Alcohol use: Not Currently    Drug use: Not Currently    Sexual activity: Not Currently     Review of Systems   Respiratory:  Positive for shortness of breath.      Objective:     Vital Signs (Most Recent):  Temp: 98.3 °F (36.8 °C) (03/13/25 2317)  Pulse: (!) 111 (03/14/25 1000)  Resp: 18 (03/14/25 1046)  BP: 138/75 (03/14/25 1000)  SpO2:  (!) 93 % (03/14/25 1000) Vital Signs (24h Range):  Temp:  [98.3 °F (36.8 °C)] 98.3 °F (36.8 °C)  Pulse:  [] 111  Resp:  [17-26] 18  SpO2:  [91 %-100 %] 93 %  BP: ()/(54-78) 138/75     Weight: 49.9 kg (110 lb)  Body mass index is 18.3 kg/m².     Physical Exam  Vitals and nursing note reviewed.   Constitutional:       Appearance: He is ill-appearing.      Comments: Severe cachectic   Neck:      Vascular: No JVD.   Cardiovascular:      Rate and Rhythm: Normal rate and regular rhythm.      Heart sounds: Normal heart sounds.   Pulmonary:      Effort: Pulmonary effort is normal.      Comments: Very poor bilateral air entries  Abdominal:      General: Bowel sounds are normal. There is no distension.      Palpations: Abdomen is soft.      Tenderness: There is no abdominal tenderness.      Comments: PEG tube in place   Musculoskeletal:         General: No deformity.   Lymphadenopathy:      Cervical: No cervical adenopathy.   Skin:     Coloration: Skin is not pale.      Findings: No rash.                Significant Labs: All pertinent labs within the past 24 hours have been reviewed.  CBC:   Recent Labs   Lab 03/13/25  2342   WBC 41.54*   HGB 11.9*   HCT 37.3*        CMP:   Recent Labs   Lab 03/14/25  0443   *   K 4.6   CL 96   CO2 29   *   BUN 22   CREATININE 0.8   CALCIUM 8.6*   PROT 6.2   ALBUMIN 3.0*   BILITOT 0.4   ALKPHOS 90   AST 21   ALT 23   ANIONGAP 6*       Significant Imaging: I have reviewed all pertinent imaging results/findings within the past 24 hours.  I have reviewed and interpreted all pertinent imaging results/findings within the past 24 hours.    Scheduled Meds:   albuterol-ipratropium  3 mL Nebulization Q4H WAKE    amitriptyline  50 mg Oral QHS    azithromycin  500 mg Intravenous Q24H    budesonide-glycopyr-formoterol  2 puff Inhalation BID    ceFEPime IV (PEDS and ADULTS)  2 g Intravenous Q8H    enoxparin  40 mg Subcutaneous Daily    levothyroxine  125 mcg Oral QHS     methylPREDNISolone injection (PEDS and ADULTS)  80 mg Intravenous Q8H    roflumilast  500 mcg Oral Daily    sodium chloride 3%  4 mL Nebulization BID     Continuous Infusions:  PRN Meds:.  Current Facility-Administered Medications:     morphine, 2 mg, Intravenous, Q4H PRN    ondansetron, 4 mg, Intravenous, Q12H PRN    oxyCODONE, 20 mg, Oral, TID PRN    sodium chloride 0.9%, 3 mL, Intravenous, PRN    Pharmacy to dose Vancomycin consult, , , Once **AND** vancomycin - pharmacy to dose, , Intravenous, pharmacy to manage frequency    X-Ray Chest 1 View  Result Date: 3/14/2025  EXAMINATION: XR CHEST 1 VIEW CLINICAL HISTORY: Sepsis; TECHNIQUE: Single frontal view of the chest was performed. COMPARISON: 06/12/2023 FINDINGS: See below     Small bilateral pleural effusions with adjacent airspace disease or atelectasis, left greater than right.  Background of chronic interstitial changes.  Normal heart size. Electronically signed by: Giselle Hou Date:    03/14/2025 Time:    00:45  - pulls last radiology orders        Assessment & Plan  Laryngeal cancer s/p surgery and chemo    Aware, patient has chronic PEG tube feeding.  Nothing by mouth strictly.   Consult dietitian for tube feeding.   Patient has chronic dysphagia/ dysarthria.   COPD with acute exacerbation  Patient's COPD is with exacerbation noted by continued dyspnea and use of accessory muscles for breathing currently.  Patient is currently on COPD Pathway. Continue scheduled inhalers Steroids, Antibiotics, and Supplemental oxygen and monitor respiratory status closely.       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   Get CT of chest.   PEG tube     Aware, consult dietitian  Bronchiectasis  Aware we will get a CT chest    Sepsis likely secondary to pneumonia  This patient does have evidence of infective focus  My overall impression is sepsis.  Source: Respiratory  Antibiotics given-   Antibiotics (72h ago, onward)      Start     Stop  "Route Frequency Ordered    03/14/25 0915  ceFEPIme injection 2 g         -- IV Every 8 hours (non-standard times) 03/14/25 0803    03/14/25 0915  azithromycin (ZITHROMAX) 500 mg in 0.9% NaCl 250 mL IVPB (admixture device)         03/19/25 0914 IV Every 24 hours (non-standard times) 03/14/25 0803    03/14/25 0903  vancomycin - pharmacy to dose  (vancomycin IVPB (PEDS and ADULTS))        Placed in "And" Linked Group    -- IV pharmacy to manage frequency 03/14/25 0803          Latest lactate reviewed-  Recent Labs   Lab 03/13/25  2342 03/14/25  0327   LACTATE  --  1.5   POCLAC 2.31*  --      Organ dysfunction indicated by Acute respiratory failure    Fluid challenge we will give fluids    Post- resuscitation assessment Yes - I attest a sepsis perfusion exam was performed within 6 hours of sepsis, severe sepsis, or septic shock presentation, following fluid resuscitation.      Will Not start Pressors- Levophed for MAP of 65  Source control achieved by:     Get 2 set blood culture, empiric cefepime/ vancomycin/ azithromycin, patient has a history of Pseudomonas in the sputum.  Get a sputum culture.  Get a CT chest with IV contrast.  Get MRSA screening.  Possible Community acquired pneumonia  Patient has a diagnosis of pneumonia. The cause of the pneumonia is suspected to be bacterial in etiology but organism is not known. The pneumonia is stable. The patient has the following signs/symptoms of pneumonia: persistent hypoxia  and cough. The patient does have a current oxygen requirement and the patient does have a home oxygen requirement. I have reviewed the pertinent imaging. The following cultures have been collected: Blood cultures and Sputum culture The culture results are listed below.     Current antimicrobial regimen consists of the antibiotics listed below. Will monitor patient closely and continue current treatment plan unchanged.    Antibiotics (From admission, onward)      Start     Stop Route Frequency Ordered " "   03/14/25 0915  ceFEPIme injection 2 g         -- IV Every 8 hours (non-standard times) 03/14/25 0803    03/14/25 0915  azithromycin (ZITHROMAX) 500 mg in 0.9% NaCl 250 mL IVPB (admixture device)         03/19/25 0914 IV Every 24 hours (non-standard times) 03/14/25 0803    03/14/25 0903  vancomycin - pharmacy to dose  (vancomycin IVPB (PEDS and ADULTS))        Placed in "And" Linked Group    -- IV pharmacy to manage frequency 03/14/25 0803            Microbiology Results (last 7 days)       Procedure Component Value Units Date/Time    Respiratory Infection Panel (PCR), Nasopharyngeal [[id number]] Collected: 03/14/25 0902    Order Status: Completed Specimen: Nasopharyngeal Swab Updated: 03/14/25 1103     Respiratory Infection Panel Source NP swab     Adenovirus Not Detected     Coronavirus 229E, Common Cold Virus Not Detected     Coronavirus HKU1, Common Cold Virus Not Detected     Coronavirus NL63, Common Cold Virus Not Detected     Coronavirus OC43, Common Cold Virus Not Detected     Comment: Coronavirus strains 229E, HKU1, NL63, and OC43 can cause the common   cold   and are not associated with the respiratory disease outbreak caused   by  the COVID-19 (SARS-CoV-2 novel Coronavirus) strain.           SARS-CoV2 (COVID-19) Qualitative PCR Not Detected     Human Metapneumovirus Not Detected     Human Rhinovirus/Enterovirus Not Detected     Influenza A Not Detected     Influenza B Not Detected     Parainfluenza Virus 1 Not Detected     Parainfluenza Virus 2 Not Detected     Parainfluenza Virus 3 Not Detected     Parainfluenza Virus 4 Not Detected     Respiratory Syncytial Virus Not Detected     Bordetella Parapertussis ([id number]) Not Detected     Bordetella pertussis (ptxP) Not Detected     Chlamydia pneumoniae Not Detected     Mycoplasma pneumoniae Not Detected     Comment: Respiratory Infection Panel testing performed by Multiplex PCR.       Narrative:      Respiratory Infection Panel source->NP Swab    Blood " culture x two cultures. Draw prior to antibiotics [[id number]] Collected: 03/14/25 0653    Order Status: Sent Specimen: Blood Updated: 03/14/25 0727    Narrative:      Collection has been rescheduled by MYB at 03/14/2025 00:30 Reason:   Unable to collect 2nd set. Nurse Notified  Collection has been rescheduled by MYB at 03/14/2025 00:30 Reason:   Unable to collect 2nd set. Nurse Notified    Blood culture x two cultures. Draw prior to antibiotics [[id number]] Collected: 03/13/25 2344    Order Status: Completed Specimen: Blood from Peripheral, Hand, Right Updated: 03/14/25 0717     Blood Culture, Routine No Growth to date    Narrative:      Aerobic and anaerobic          Severe malnutrition  Nutrition consulted. Most recent weight and BMI monitored-     Measurements:  Wt Readings from Last 1 Encounters:   03/13/25 49.9 kg (110 lb)   Body mass index is 18.3 kg/m².    Patient has been screened and assessed by RD.    Malnutrition Type:  Context:    Level:      Malnutrition Characteristic Summary:       Interventions/Recommendations (treatment strategy):       VTE Risk Mitigation (From admission, onward)           Ordered     enoxaparin injection 40 mg  Daily         03/14/25 0803     IP VTE HIGH RISK PATIENT  Once         03/14/25 0803     Place sequential compression device  Until discontinued         03/14/25 0803                    Discharge Planning   JENNA:      Code Status: Full Code   Medical Readiness for Discharge Date:                Patient also has a elevated troponin, most likely secondary to hypoxic respiratory failure.  EKG sinus tachycardia.  We will trend troponin.             Mor Alfaro MD  Department of Hospital Medicine   [facility] - Emergency Dept

## 2025-03-14 NOTE — PLAN OF CARE
Problem: Enteral Nutrition  Goal: Absence of Aspiration Signs and Symptoms  Outcome: Progressing  Goal: Safe, Effective Therapy Delivery  Outcome: Progressing  Goal: Feeding Tolerance  Outcome: Progressing  Intervention: Prevent and Manage Feeding Intolerance  Flowsheets (Taken 3/14/2025 5403)  Nutrition Support Management:   tube feeding initiated   weight trending reviewed   other (see comments)     Recommendations  1.) Will begin Glucerna 1.5-5 cartons daily to provide 1780 kcals, 100gm protein and 900mL of free water.   Suggest FWF 100mL after each feed.   TF to meet 100% EEN and EPN.   2.) If kitchen out of Glucerna, suggest Diabetisource-6 cans daily to provide 1800 kcals, 90gm protein and 1224mL of free water.  Suggest FWF 80mL after each feed.   TF to meet 100% EEN and EPN.   3.) RD to perform NFPE at follow up.  Nutrition Goal Status: new

## 2025-03-14 NOTE — ED NOTES
PATIENT WIFE WANTS TO MAKE SURE ALL THOSE INVOLVED IN PATIENTS CARE KNOW THAT PATIENT HAS A PEG TUBE AND CAN NOT HAVE ANYTHING BY MOUTH

## 2025-03-14 NOTE — PROGRESS NOTES
Automatic Inhaler to Nebulizer Interchange    budesonide/glycopyrrolate/formoterol (Breztri) 640 mcg/ 36 mcg/ 19.2 mcg changed to budesonide 1 mg twice daily AND ipratropium 0.5 mg every 6 hour scheduled AND arformoterol 15 mcg twice daily per [facility] Automatic Therapeutic Substitutions Protocol.    Please contact pharmacy at extension [phone] with any questions.     Thank you,   Katrin Monge

## 2025-03-14 NOTE — HPI
This is a 70-year-old  male with a history of severe COPD, chronic hypoxic respiratory failure on 2 L nasal cannula 24/7, severe cachexia with a BMI 18.3,  throat cancer status post chemoradiation with chronic dysphagia on PEG tube feeding presenting here for worsened shortness of breaths for past few days.  Also reported may have altered mental status, however patient is awake alert when I evaluated him.  Patient has severe dysarthria secondary to previous throat cancer, history is obtained from wife at bedside.  Patient apparently has been feel unwell for few days, no sick contacts.  No chest pain.  No fever or chills.  Patient has PEG tube with every feeding/ medication through this.  Patient has is absolutely nothing by mouth.  Patient also has a chest vest on 3% normal saline nebulizer at home.   In the ER, patient is afebrile, hemodynamically stable.  WBC count is 40 K. patient did not received any steroids prior to this as per wife.

## 2025-03-14 NOTE — ASSESSMENT & PLAN NOTE
"Patient has a diagnosis of pneumonia. The cause of the pneumonia is suspected to be bacterial in etiology but organism is not known. The pneumonia is stable. The patient has the following signs/symptoms of pneumonia: persistent hypoxia  and cough. The patient does have a current oxygen requirement and the patient does have a home oxygen requirement. I have reviewed the pertinent imaging. The following cultures have been collected: Blood cultures and Sputum culture The culture results are listed below.     Current antimicrobial regimen consists of the antibiotics listed below. Will monitor patient closely and continue current treatment plan unchanged.    Antibiotics (From admission, onward)      Start     Stop Route Frequency Ordered    03/14/25 0915  ceFEPIme injection 2 g         -- IV Every 8 hours (non-standard times) 03/14/25 0803    03/14/25 0915  azithromycin (ZITHROMAX) 500 mg in 0.9% NaCl 250 mL IVPB (admixture device)         03/19/25 0914 IV Every 24 hours (non-standard times) 03/14/25 0803 03/14/25 0903  vancomycin - pharmacy to dose  (vancomycin IVPB (PEDS and ADULTS))        Placed in "And" Linked Group    -- IV pharmacy to manage frequency 03/14/25 0803            Microbiology Results (last 7 days)       Procedure Component Value Units Date/Time    Respiratory Infection Panel (PCR), Nasopharyngeal [[id number]] Collected: 03/14/25 0902    Order Status: Completed Specimen: Nasopharyngeal Swab Updated: 03/14/25 1103     Respiratory Infection Panel Source NP swab     Adenovirus Not Detected     Coronavirus 229E, Common Cold Virus Not Detected     Coronavirus HKU1, Common Cold Virus Not Detected     Coronavirus NL63, Common Cold Virus Not Detected     Coronavirus OC43, Common Cold Virus Not Detected     Comment: Coronavirus strains 229E, HKU1, NL63, and OC43 can cause the common   cold   and are not associated with the respiratory disease outbreak caused   by  the COVID-19 (SARS-CoV-2 novel Coronavirus) " strain.           SARS-CoV2 (COVID-19) Qualitative PCR Not Detected     Human Metapneumovirus Not Detected     Human Rhinovirus/Enterovirus Not Detected     Influenza A Not Detected     Influenza B Not Detected     Parainfluenza Virus 1 Not Detected     Parainfluenza Virus 2 Not Detected     Parainfluenza Virus 3 Not Detected     Parainfluenza Virus 4 Not Detected     Respiratory Syncytial Virus Not Detected     Bordetella Parapertussis ([id number]) Not Detected     Bordetella pertussis (ptxP) Not Detected     Chlamydia pneumoniae Not Detected     Mycoplasma pneumoniae Not Detected     Comment: Respiratory Infection Panel testing performed by Multiplex PCR.       Narrative:      Respiratory Infection Panel source->NP Swab    Blood culture x two cultures. Draw prior to antibiotics [[id number]] Collected: 03/14/25 0653    Order Status: Sent Specimen: Blood Updated: 03/14/25 0727    Narrative:      Collection has been rescheduled by MYB at 03/14/2025 00:30 Reason:   Unable to collect 2nd set. Nurse Notified  Collection has been rescheduled by MYB at 03/14/2025 00:30 Reason:   Unable to collect 2nd set. Nurse Notified    Blood culture x two cultures. Draw prior to antibiotics [[id number]] Collected: 03/13/25 2344    Order Status: Completed Specimen: Blood from Peripheral, Hand, Right Updated: 03/14/25 0717     Blood Culture, Routine No Growth to date    Narrative:      Aerobic and anaerobic

## 2025-03-14 NOTE — ASSESSMENT & PLAN NOTE
"This patient does have evidence of infective focus  My overall impression is sepsis.  Source: Respiratory  Antibiotics given-   Antibiotics (72h ago, onward)      Start     Stop Route Frequency Ordered    03/14/25 0915  ceFEPIme injection 2 g         -- IV Every 8 hours (non-standard times) 03/14/25 0803    03/14/25 0915  azithromycin (ZITHROMAX) 500 mg in 0.9% NaCl 250 mL IVPB (admixture device)         03/19/25 0914 IV Every 24 hours (non-standard times) 03/14/25 0803 03/14/25 0903  vancomycin - pharmacy to dose  (vancomycin IVPB (PEDS and ADULTS))        Placed in "And" Linked Group    -- IV pharmacy to manage frequency 03/14/25 0803          Latest lactate reviewed-  Recent Labs   Lab 03/13/25  2342 03/14/25  0327   LACTATE  --  1.5   POCLAC 2.31*  --      Organ dysfunction indicated by Acute respiratory failure    Fluid challenge we will give fluids    Post- resuscitation assessment Yes - I attest a sepsis perfusion exam was performed within 6 hours of sepsis, severe sepsis, or septic shock presentation, following fluid resuscitation.      Will Not start Pressors- Levophed for MAP of 65  Source control achieved by:     Get 2 set blood culture, empiric cefepime/ vancomycin/ azithromycin, patient has a history of Pseudomonas in the sputum.  Get a sputum culture.  Get a CT chest with IV contrast.  Get MRSA screening.  "

## 2025-03-14 NOTE — PLAN OF CARE
[facility]  Initial Discharge Assessment       Primary Care Provider: Zulema Gutierrez    Admission Diagnosis: COPD exacerbation [J44.1]    Admission Date: 3/13/2025  Expected Discharge Date: 3/18/2025    SW met with patient bedside. Linh Moy, spouse, was present in the room assisting with assessment. SW verified demographics, insurance, supports, and PCP (Dr. OSVALDO Flores).  Patient reported living in the home with spouse and spouse drives to appointments. SW assessed patient's needs. Patient is unable to complete ADLs independently. Patient verified at home DME: oxygen, wheelchair, nebulizer.  Patient verified No HH, No Dialysis, No Blood Thinners, and Oxygen (2 liters). Patient uses oxygen at 2 liters from [facility].      Patient verified pharmacy of choice: [facility] on [facility].  Patient confirmed spouse will be source of transportation at the time of discharge.     Transition of Care Barriers: None    Payor: [employer] / Plan: [employer] MEDICARE HMO / Product Type: Medicare Advantage /     Extended Emergency Contact Information  Primary Emergency Contact: Linh Moy  Address: [id number] [place] [address]  Home Phone: [phone]  Mobile Phone: [phone]  Relation: Spouse   needed? No    Discharge Plan A: Home  Discharge Plan B: Home      [id number] [address] AT [facility] & [address] [address]  Phone: [phone] Fax: [phone]      Initial Assessment (most recent)       Adult Discharge Assessment - 03/14/25 1556          Discharge Assessment    Assessment Type Discharge Planning Assessment     Confirmed/corrected address, phone number and insurance Yes     Confirmed Demographics Correct on Facesheet     Source of Information patient;family     If unable to respond/provide information was family/caregiver contacted? Yes     Contact Name/Number Linh Moy, spouse,  [phone]     When was your last doctors appointment? 10/31/24     Communicated JENNA with patient/caregiver Date not available/Unable to determine     Reason For Admission Sepsis     People in Home spouse     Do you expect to return to your current living situation? Yes     Do you have help at home or someone to help you manage your care at home? Yes     Who are your caregiver(s) and their phone number(s)? Linh Moy, spouse, [phone]     Prior to hospitilization cognitive status: Unable to Assess     Current cognitive status: Alert/Oriented     Walking or Climbing Stairs Difficulty yes     Walking or Climbing Stairs ambulation difficulty, requires equipment     Mobility Management wheelchair     Dressing/Bathing Difficulty yes     Dressing/Bathing bathing difficulty, assistance 1 person;dressing difficulty, assistance 1 person     Home Accessibility wheelchair accessible     Home Layout Able to live on 1st floor     Equipment Currently Used at Home oxygen;nebulizer;wheelchair     Readmission within 30 days? No     Patient currently being followed by outpatient case management? No     Do you currently have service(s) that help you manage your care at home? No     Do you take prescription medications? Yes     Do you have prescription coverage? Yes     Coverage [employer]     Do you have any problems affording any of your prescribed medications? TBD     Is the patient taking medications as prescribed? yes     Who is going to help you get home at discharge? spouse     How do you get to doctors appointments? family or friend will provide     Are you on dialysis? No     Do you take coumadin? No     Discharge Plan A Home     Discharge Plan B Home     DME Needed Upon Discharge  none     Discharge Plan discussed with: Spouse/sig other;Patient     Name(s) and Number(s) Linh Moy, spouse, [phone]     Transition of Care Barriers None

## 2025-03-14 NOTE — PROGRESS NOTES
"Pharmacokinetic Initial Assessment: IV Vancomycin    Assessment/Plan:    Initiate intravenous vancomycin with loading dose of 1250 mg once followed by a maintenance dose of vancomycin 1250 mg IV every 24 hours  Desired empiric serum trough concentration is 15 to 20 mcg/mL  Draw vancomycin trough level 60 min prior to third dose on 03/16/2025 at approximately 00:00  Pharmacy will continue to follow and monitor vancomycin.      Please contact pharmacy at extension [phone] with any questions regarding this assessment.     Thank you for the consult,   Keya Crowell       Patient brief summary:  Reji Romano is a 70 y.o. male initiated on antimicrobial therapy with IV Vancomycin for treatment of suspected lower respiratory infection    Drug Allergies:   Review of patient's allergies indicates:   Allergen Reactions    Propoxyphene Anaphylaxis       Actual Body Weight:   49.9 kg    Renal Function:   Estimated Creatinine Clearance: 61.5 mL/min (based on SCr of 0.8 mg/dL).,     Dialysis Method (if applicable):  N/A    CBC (last 72 hours):  Recent Labs   Lab Result Units 03/13/25  2342   WBC K/uL 41.54*   Hemoglobin g/dL 11.9*   Hematocrit % 37.3*   Platelets K/uL 307   Gran % % 56.0   Lymph % % 5.0*   Mono % % 3.0*   Eosinophil % % 0.0   Basophil % % 0.0   Differential Method  Manual       Metabolic Panel (last 72 hours):  Recent Labs   Lab Result Units 03/14/25  0022 03/14/25  0443   Sodium mmol/L  --  131*   Potassium mmol/L  --  4.6   Chloride mmol/L  --  96   CO2 mmol/L  --  29   Glucose mg/dL  --  142*   Glucose, UA  Negative  --    BUN mg/dL  --  22   Creatinine mg/dL  --  0.8   Albumin g/dL  --  3.0*   Total Bilirubin mg/dL  --  0.4   Alkaline Phosphatase U/L  --  90   AST U/L  --  21   ALT U/L  --  23   Magnesium mg/dL  --  1.8   Phosphorus mg/dL  --  3.0       Drug levels (last 3 results):  No results for input(s): "VANCOMYCINRA", "VANCORANDOM", "VANCOMYCINPE", "VANCOPEAK", "VANCOMYCINTR", "VANCOTROUGH" in the last " 72 hours.    Microbiologic Results:  Microbiology Results (last 7 days)       Procedure Component Value Units Date/Time    MRSA Screen by PCR [[id number]] Collected: 03/14/25 1343    Order Status: Completed Specimen: Nasal Swab Updated: 03/14/25 1651     MRSA SCREEN BY PCR Not Detected    Blood culture x two cultures. Draw prior to antibiotics [[id number]] Collected: 03/14/25 0653    Order Status: Completed Specimen: Blood Updated: 03/14/25 1358     Blood Culture, Routine No Growth to date    Narrative:      Aerobic and anaerobic  Collection has been rescheduled by MYB at 03/14/2025 00:30 Reason:   Unable to collect 2nd set. Nurse Notified  Collection has been rescheduled by MYB at 03/14/2025 00:30 Reason:   Unable to collect 2nd set. Nurse Notified    Culture, Respiratory with Gram Stain [[id number]]     Order Status: No result Specimen: Respiratory     Respiratory Infection Panel (PCR), Nasopharyngeal [[id number]] Collected: 03/14/25 0902    Order Status: Completed Specimen: Nasopharyngeal Swab Updated: 03/14/25 1103     Respiratory Infection Panel Source NP swab     Adenovirus Not Detected     Coronavirus 229E, Common Cold Virus Not Detected     Coronavirus HKU1, Common Cold Virus Not Detected     Coronavirus NL63, Common Cold Virus Not Detected     Coronavirus OC43, Common Cold Virus Not Detected     Comment: Coronavirus strains 229E, HKU1, NL63, and OC43 can cause the common   cold   and are not associated with the respiratory disease outbreak caused   by  the COVID-19 (SARS-CoV-2 novel Coronavirus) strain.           SARS-CoV2 (COVID-19) Qualitative PCR Not Detected     Human Metapneumovirus Not Detected     Human Rhinovirus/Enterovirus Not Detected     Influenza A Not Detected     Influenza B Not Detected     Parainfluenza Virus 1 Not Detected     Parainfluenza Virus 2 Not Detected     Parainfluenza Virus 3 Not Detected     Parainfluenza Virus 4 Not Detected     Respiratory Syncytial Virus Not Detected      Bordetella Parapertussis (IU6369) Not Detected     Bordetella pertussis (ptxP) Not Detected     Chlamydia pneumoniae Not Detected     Mycoplasma pneumoniae Not Detected     Comment: Respiratory Infection Panel testing performed by Multiplex PCR.       Narrative:      Respiratory Infection Panel source->NP Swab    Blood culture x two cultures. Draw prior to antibiotics [[id number]] Collected: 03/13/25 3554    Order Status: Completed Specimen: Blood from Peripheral, Hand, Right Updated: 03/14/25 0717     Blood Culture, Routine No Growth to date    Narrative:      Aerobic and anaerobic

## 2025-03-14 NOTE — SUBJECTIVE & OBJECTIVE
History reviewed. No pertinent past medical history.    History reviewed. No pertinent surgical history.    Review of patient's allergies indicates:   Allergen Reactions    Propoxyphene Anaphylaxis       No current facility-administered medications on file prior to encounter.     Current Outpatient Medications on File Prior to Encounter   Medication Sig    albuterol (PROVENTIL) 2.5 mg /3 mL (0.083 %) nebulizer solution Take 2.5 mg by nebulization 2 (two) times a day.    albuterol (PROVENTIL/VENTOLIN HFA) 90 mcg/actuation inhaler Inhale 1-2 puffs into the lungs every 6 (six) hours as needed for Wheezing.    amitriptyline (ELAVIL) 50 MG tablet Take 50 mg by mouth every evening.    BREZTRI AEROSPHERE 160-9-4.8 mcg/actuation HFAA Inhale 2 puffs into the lungs 2 (two) times daily.    famotidine (PEPCID) 20 MG tablet Take 20 mg by mouth 2 (two) times daily.    FEROSUL 325 mg (65 mg iron) Tab tablet Take 325 mg by mouth every other day.    fluticasone propionate (FLONASE) 50 mcg/actuation nasal spray 1 spray by Each Nostril route once daily.    levothyroxine (SYNTHROID) 125 MCG tablet Take 125 mcg by mouth every evening.    oxyCODONE (ROXICODONE) 20 mg Tab immediate release tablet Take 1 tablet by mouth 3 (three) times daily as needed for Pain.    roflumilast (DALIRESP) 500 mcg Tab Take 500 mcg by mouth once daily.    sodium chloride 3% 3 % nebulizer solution Take 4 mLs by nebulization 2 (two) times a day.    azithromycin (ZITHROMAX) 500 MG tablet Take 500 mg by mouth every Mon, Wed, Fri.    hydrOXYzine HCL (ATARAX) 25 MG tablet Take 12.5 mg by mouth 3 (three) times daily as needed. (Patient not taking: Reported on 3/14/2025)    [DISCONTINUED] amLODIPine (NORVASC) 5 MG tablet Take 5 mg by mouth once daily.    [DISCONTINUED] oxyCODONE-acetaminophen (PERCOCET)  mg per tablet Take 1 tablet by mouth every 8 (eight) hours as needed for Pain.    [DISCONTINUED] tobramycin (BETHKIS) 300 mg/4 mL Nebu Inhale 300 mg into the  no lungs 2 (two) times a day. 1 month on and 1 month off     Family History    None       Tobacco Use    Smoking status: Former     Types: Cigarettes    Smokeless tobacco: Not on file   Substance and Sexual Activity    Alcohol use: Not Currently    Drug use: Not Currently    Sexual activity: Not Currently     Review of Systems   Constitutional:  Positive for fatigue.   Respiratory:  Positive for shortness of breath.      Objective:     Vital Signs (Most Recent):  Temp: 97.8 °F (36.6 °C) (03/14/25 1503)  Pulse: (!) 123 (03/14/25 1400)  Resp: (!) 25 (03/14/25 1358)  BP: 123/88 (03/14/25 1400)  SpO2: 96 % (03/14/25 1358) Vital Signs (24h Range):  Temp:  [97.8 °F (36.6 °C)-98.3 °F (36.8 °C)] 97.8 °F (36.6 °C)  Pulse:  [] 123  Resp:  [17-26] 25  SpO2:  [90 %-100 %] 96 %  BP: ()/(54-88) 123/88     Weight: 49.9 kg (110 lb)  Body mass index is 18.3 kg/m².     Physical Exam  Vitals and nursing note reviewed.   Constitutional:       Appearance: He is ill-appearing.      Comments: Severe cachectic   Neck:      Vascular: No JVD.   Cardiovascular:      Rate and Rhythm: Normal rate and regular rhythm.      Heart sounds: Normal heart sounds.   Pulmonary:      Effort: Pulmonary effort is normal.      Comments: Very poor bilateral air entries  Abdominal:      General: Bowel sounds are normal. There is no distension.      Palpations: Abdomen is soft.      Tenderness: There is no abdominal tenderness.      Comments: PEG tube in place   Musculoskeletal:         General: No deformity.   Lymphadenopathy:      Cervical: No cervical adenopathy.   Skin:     Coloration: Skin is not pale.      Findings: No rash.                Significant Labs: All pertinent labs within the past 24 hours have been reviewed.  CBC:   Recent Labs   Lab 03/13/25  2342   WBC 41.54*   HGB 11.9*   HCT 37.3*        CMP:   Recent Labs   Lab 03/14/25  0443   *   K 4.6   CL 96   CO2 29   *   BUN 22   CREATININE 0.8   CALCIUM 8.6*   PROT 6.2    ALBUMIN 3.0*   BILITOT 0.4   ALKPHOS 90   AST 21   ALT 23   ANIONGAP 6*     Troponin:   Recent Labs   Lab 03/14/25  0018 03/14/25  1235   TROPONINIHS 61.7* 8.6       Significant Imaging: I have reviewed all pertinent imaging results/findings within the past 24 hours.  I have reviewed and interpreted all pertinent imaging results/findings within the past 24 hours.      Scheduled Meds:   albuterol-ipratropium  3 mL Nebulization Q4H WAKE    amitriptyline  50 mg Oral QHS    arformoteroL  15 mcg Nebulization BID    azithromycin  500 mg Intravenous Q24H    budesonide  1 mg Nebulization Q12H    ceFEPime IV (PEDS and ADULTS)  2 g Intravenous Q8H    enoxparin  40 mg Subcutaneous Daily    levothyroxine  125 mcg Oral QHS    methylPREDNISolone injection (PEDS and ADULTS)  80 mg Intravenous Q8H    roflumilast  500 mcg Oral Daily    sodium chloride 3%  4 mL Nebulization BID     Continuous Infusions:   0.9% NaCl   Intravenous Continuous         PRN Meds:.  Current Facility-Administered Medications:     morphine, 2 mg, Intravenous, Q4H PRN    ondansetron, 4 mg, Intravenous, Q12H PRN    oxyCODONE, 20 mg, Oral, TID PRN    sodium chloride 0.9%, 3 mL, Intravenous, PRN    Pharmacy to dose Vancomycin consult, , , Once **AND** vancomycin - pharmacy to dose, , Intravenous, pharmacy to manage frequency    CTA Chest Non-Coronary (PE Studies)  Result Date: 3/14/2025  CMS MANDATED QUALITY DATA - CT RADIATION - 436 All CT scans at this facility utilize dose modulation, iterative reconstruction, and/or weight based dosing when appropriate to reduce radiation dose to as low as reasonably achievable. CLINICAL HISTORY: ([id number])69 y/o  (1955) M Pulmonary embolism (PE) suspected, unknown D-dimer; TECHNIQUE: (A#[id number], exam time 3/14/2025 13:36) CTA CHEST NON CORONARY (PE STUDIES) [id number] Axial CT examination of the chest with attention to the pulmonary arteries was performed using contiguous axial images from the diaphragms to the lung  apices following the intravenous administration of non-ionic contrast material. Images were reviewed using lung, mediastinal, and bone windows. The study was performed with thin sections with subsequent 3-D reformats/MIP/reconstructions in multiple planes. COMPARISON: Radiograph of the chest from 03/14/2025 CT of the chest from 06/04/2023. FINDINGS: Technically suboptimal exam secondary to motion artifact throughout. CTA PE evaluation: This is a moderate to high quality study for the evaluation of pulmonary embolism secondary to a combination of contrast bolus timing and motion artifact. The pulmonary arteries are normal in appearance without pulmonary emboli noted up to the segmental level, noting the limitations of CT technique for identifying small or isolated subsegmental emboli. CT Chest: Visualized neck: normal Lungs: Mild, upper lobe, centrilobular predominant emphysematous lung disease is present.  Mild biapical pleural thickening/scarring is present.  There is a 4 mm right lower lobe pulmonary nodule (image 179), and additional 4 mm left lower lobe pulmonary nodule (image 255).  Additional smaller punctate nodules may be present, difficult to assess secondary to the underlying motion artifact. Airway: Moderate cylindrical bronchiectasis is present with a dependent lower lobe predominance.  There is a small amount heterogeneous foamy debris in the dependent trachea and mainstem bronchi suggestive of mucous, secretions and/or aspiration. Pleura: There is no pleural effusion. There is no pneumothorax. Cardiovascular: The heart is normal in size. There are no findings of right heart failure.  Calcified plaque is seen at the aortic arch and in the LAD distribution of the coronary arteries. Mediastinum: No lymphadenopathy is seen. Soft tissues: normal Musculoskeletal: There are mild degenerative changes of the thoracic spine.  There are no suspicious osseous lesions. Esophagus: normal Upper Abdomen: No acute  abnormality is seen in the upper abdomen.  There is a PEG tube with balloon inflated in the body of the stomach.     1. No evidence of pulmonary embolism to the segmental arterial level. If there is continued clinical concern, consider further evaluation with lower extremity doppler. 2. Mild bronchiectasis with a central hilar lower lobe predominance. 3.  Minimal scattered punctate nodules present, partially obscured by motion artifact suggesting either minimal infection, atelectasis or scarring.  The largest measurable nodule is 4 mm in diameter, and is unchanged from the previous CT, for which no additional follow-up will be warranted. Electronically signed by: Ashish Sauceda Date:    03/14/2025 Time:    13:41    X-Ray Chest 1 View  Result Date: 3/14/2025  EXAMINATION: XR CHEST 1 VIEW CLINICAL HISTORY: Sepsis; TECHNIQUE: Single frontal view of the chest was performed. COMPARISON: 06/12/2023 FINDINGS: See below     Small bilateral pleural effusions with adjacent airspace disease or atelectasis, left greater than right.  Background of chronic interstitial changes.  Normal heart size. Electronically signed by: Giselle Hou Date:    03/14/2025 Time:    00:45  - pulls last radiology orders

## 2025-03-14 NOTE — PLAN OF CARE
VERNA unable to complete assessment due to patient having medical test ran at bedside. SW will follow up with patient at a later time.      03/14/25 1312   Discharge Assessment   Assessment Type Discharge Planning Assessment

## 2025-03-15 LAB
ALBUMIN SERPL BCP-MCNC: 3.4 G/DL (ref 3.5–5.2)
ALP SERPL-CCNC: 89 U/L (ref 55–135)
ALT SERPL W/O P-5'-P-CCNC: 23 U/L (ref 10–44)
ANION GAP SERPL CALC-SCNC: 7 MMOL/L (ref 8–16)
AST SERPL-CCNC: 16 U/L (ref 10–40)
BASOPHILS # BLD AUTO: 0.03 K/UL (ref 0–0.2)
BASOPHILS NFR BLD: 0.2 % (ref 0–1.9)
BILIRUB SERPL-MCNC: 0.3 MG/DL (ref 0.1–1)
BUN SERPL-MCNC: 21 MG/DL (ref 8–23)
CALCIUM SERPL-MCNC: 9.1 MG/DL (ref 8.7–10.5)
CHLORIDE SERPL-SCNC: 102 MMOL/L (ref 95–110)
CO2 SERPL-SCNC: 27 MMOL/L (ref 23–29)
CREAT SERPL-MCNC: 0.6 MG/DL (ref 0.5–1.4)
DIFFERENTIAL METHOD BLD: ABNORMAL
EOSINOPHIL # BLD AUTO: 0 K/UL (ref 0–0.5)
EOSINOPHIL NFR BLD: 0.2 % (ref 0–8)
ERYTHROCYTE [DISTWIDTH] IN BLOOD BY AUTOMATED COUNT: 13.9 % (ref 11.5–14.5)
EST. GFR  (NO RACE VARIABLE): >60 ML/MIN/1.73 M^2
GLUCOSE SERPL-MCNC: 107 MG/DL (ref 70–110)
HCT VFR BLD AUTO: 43 % (ref 40–54)
HGB BLD-MCNC: 13.3 G/DL (ref 14–18)
IMM GRANULOCYTES # BLD AUTO: 0.16 K/UL (ref 0–0.04)
IMM GRANULOCYTES NFR BLD AUTO: 0.8 % (ref 0–0.5)
LYMPHOCYTES # BLD AUTO: 0.4 K/UL (ref 1–4.8)
LYMPHOCYTES NFR BLD: 1.9 % (ref 18–48)
MAGNESIUM SERPL-MCNC: 2.1 MG/DL (ref 1.6–2.6)
MCH RBC QN AUTO: 28.7 PG (ref 27–31)
MCHC RBC AUTO-ENTMCNC: 30.9 G/DL (ref 32–36)
MCV RBC AUTO: 93 FL (ref 82–98)
MONOCYTES # BLD AUTO: 0.3 K/UL (ref 0.3–1)
MONOCYTES NFR BLD: 1.8 % (ref 4–15)
NEUTROPHILS # BLD AUTO: 18.1 K/UL (ref 1.8–7.7)
NEUTROPHILS NFR BLD: 95.1 % (ref 38–73)
NRBC BLD-RTO: 0 /100 WBC
PHOSPHATE SERPL-MCNC: 3.1 MG/DL (ref 2.7–4.5)
PLATELET # BLD AUTO: 301 K/UL (ref 150–450)
PMV BLD AUTO: 10.6 FL (ref 9.2–12.9)
POTASSIUM SERPL-SCNC: 4.8 MMOL/L (ref 3.5–5.1)
PROT SERPL-MCNC: 6.5 G/DL (ref 6–8.4)
RBC # BLD AUTO: 4.63 M/UL (ref 4.6–6.2)
SODIUM SERPL-SCNC: 136 MMOL/L (ref 136–145)
WBC # BLD AUTO: 19.06 K/UL (ref 3.9–12.7)

## 2025-03-15 PROCEDURE — 25000003 PHARM REV CODE 250: Performed by: HOSPITALIST

## 2025-03-15 PROCEDURE — 83735 ASSAY OF MAGNESIUM: CPT | Performed by: HOSPITALIST

## 2025-03-15 PROCEDURE — 21000000 HC CCU ICU ROOM CHARGE

## 2025-03-15 PROCEDURE — 94664 DEMO&/EVAL PT USE INHALER: CPT

## 2025-03-15 PROCEDURE — 63600175 PHARM REV CODE 636 W HCPCS: Performed by: HOSPITALIST

## 2025-03-15 PROCEDURE — 99900031 HC PATIENT EDUCATION (STAT)

## 2025-03-15 PROCEDURE — 25000242 PHARM REV CODE 250 ALT 637 W/ HCPCS: Performed by: HOSPITALIST

## 2025-03-15 PROCEDURE — 99900035 HC TECH TIME PER 15 MIN (STAT)

## 2025-03-15 PROCEDURE — 80053 COMPREHEN METABOLIC PANEL: CPT | Performed by: HOSPITALIST

## 2025-03-15 PROCEDURE — 94668 MNPJ CHEST WALL SBSQ: CPT

## 2025-03-15 PROCEDURE — 27000221 HC OXYGEN, UP TO 24 HOURS

## 2025-03-15 PROCEDURE — 94640 AIRWAY INHALATION TREATMENT: CPT

## 2025-03-15 PROCEDURE — 85025 COMPLETE CBC W/AUTO DIFF WBC: CPT | Performed by: HOSPITALIST

## 2025-03-15 PROCEDURE — 36415 COLL VENOUS BLD VENIPUNCTURE: CPT | Performed by: HOSPITALIST

## 2025-03-15 PROCEDURE — 94761 N-INVAS EAR/PLS OXIMETRY MLT: CPT

## 2025-03-15 PROCEDURE — 84100 ASSAY OF PHOSPHORUS: CPT | Performed by: HOSPITALIST

## 2025-03-15 RX ADMIN — OXYCODONE HYDROCHLORIDE 20 MG: 10 TABLET ORAL at 09:03

## 2025-03-15 RX ADMIN — IPRATROPIUM BROMIDE AND ALBUTEROL SULFATE 3 ML: .5; 3 SOLUTION RESPIRATORY (INHALATION) at 03:03

## 2025-03-15 RX ADMIN — CEFEPIME 2 G: 2 INJECTION, POWDER, FOR SOLUTION INTRAVENOUS at 01:03

## 2025-03-15 RX ADMIN — METHYLPREDNISOLONE SODIUM SUCCINATE 80 MG: 40 INJECTION, POWDER, FOR SOLUTION INTRAMUSCULAR; INTRAVENOUS at 06:03

## 2025-03-15 RX ADMIN — SODIUM CHLORIDE SOLN NEBU 3% 4 ML: 3 NEBU SOLN at 11:03

## 2025-03-15 RX ADMIN — OXYCODONE HYDROCHLORIDE 20 MG: 10 TABLET ORAL at 04:03

## 2025-03-15 RX ADMIN — IPRATROPIUM BROMIDE AND ALBUTEROL SULFATE 3 ML: .5; 3 SOLUTION RESPIRATORY (INHALATION) at 06:03

## 2025-03-15 RX ADMIN — ARFORMOTEROL TARTRATE 15 MCG: 15 SOLUTION RESPIRATORY (INHALATION) at 06:03

## 2025-03-15 RX ADMIN — SODIUM CHLORIDE: 9 INJECTION, SOLUTION INTRAVENOUS at 11:03

## 2025-03-15 RX ADMIN — VANCOMYCIN HYDROCHLORIDE 1250 MG: 1.25 INJECTION, POWDER, LYOPHILIZED, FOR SOLUTION INTRAVENOUS at 01:03

## 2025-03-15 RX ADMIN — BUDESONIDE INHALATION 1 MG: 0.5 SUSPENSION RESPIRATORY (INHALATION) at 07:03

## 2025-03-15 RX ADMIN — LEVOTHYROXINE SODIUM 125 MCG: 0.1 TABLET ORAL at 09:03

## 2025-03-15 RX ADMIN — METHYLPREDNISOLONE SODIUM SUCCINATE 60 MG: 40 INJECTION, POWDER, FOR SOLUTION INTRAMUSCULAR; INTRAVENOUS at 11:03

## 2025-03-15 RX ADMIN — BUDESONIDE INHALATION 1 MG: 0.5 SUSPENSION RESPIRATORY (INHALATION) at 06:03

## 2025-03-15 RX ADMIN — METHYLPREDNISOLONE SODIUM SUCCINATE 60 MG: 40 INJECTION, POWDER, FOR SOLUTION INTRAMUSCULAR; INTRAVENOUS at 03:03

## 2025-03-15 RX ADMIN — ENOXAPARIN SODIUM 40 MG: 40 INJECTION SUBCUTANEOUS at 06:03

## 2025-03-15 RX ADMIN — IPRATROPIUM BROMIDE AND ALBUTEROL SULFATE 3 ML: .5; 3 SOLUTION RESPIRATORY (INHALATION) at 11:03

## 2025-03-15 RX ADMIN — CEFEPIME 2 G: 2 INJECTION, POWDER, FOR SOLUTION INTRAVENOUS at 06:03

## 2025-03-15 RX ADMIN — SODIUM CHLORIDE SOLN NEBU 3% 4 ML: 3 NEBU SOLN at 07:03

## 2025-03-15 RX ADMIN — OXYCODONE HYDROCHLORIDE 20 MG: 10 TABLET ORAL at 11:03

## 2025-03-15 RX ADMIN — AZITHROMYCIN MONOHYDRATE 500 MG: 500 INJECTION, POWDER, LYOPHILIZED, FOR SOLUTION INTRAVENOUS at 09:03

## 2025-03-15 RX ADMIN — ROFLUMILAST 500 MCG: 500 TABLET ORAL at 09:03

## 2025-03-15 RX ADMIN — CEFEPIME 2 G: 2 INJECTION, POWDER, FOR SOLUTION INTRAVENOUS at 09:03

## 2025-03-15 RX ADMIN — AMITRIPTYLINE HYDROCHLORIDE 50 MG: 25 TABLET, FILM COATED ORAL at 09:03

## 2025-03-15 NOTE — ASSESSMENT & PLAN NOTE
Nutrition consulted. Most recent weight and BMI monitored-     Measurements:  Wt Readings from Last 1 Encounters:   03/14/25 47.9 kg (105 lb 9.6 oz)   Body mass index is 17.57 kg/m².    Patient has been screened and assessed by RD.    Malnutrition Type:  Context:    Level:      Malnutrition Characteristic Summary:       Interventions/Recommendations (treatment strategy):  1.) Will begin Glucerna 1.5-5 cartons daily to provide 1780 kcals, 100gm protein and 900mL of free water. Suggest FWF 100mL after each feed. TF to meet 100% EEN and EPN. 2.) If kitchen out of Glucerna, suggest Diabetisource-6 cans daily to provide 1800 kcals, 90gm protein and 1224mL of free water. Suggest FWF 80mL after each feed. TF to meet 100% EEN and EPN. 3.) RD to perform NFPE at follow up.

## 2025-03-15 NOTE — ASSESSMENT & PLAN NOTE
Patient has a diagnosis of pneumonia. The cause of the pneumonia is suspected to be bacterial in etiology but organism is not known. The pneumonia is stable. The patient has the following signs/symptoms of pneumonia: persistent hypoxia  and cough. The patient does have a current oxygen requirement and the patient does have a home oxygen requirement. I have reviewed the pertinent imaging. The following cultures have been collected: Blood cultures and Sputum culture The culture results are listed below.     Current antimicrobial regimen consists of the antibiotics listed below. Will monitor patient closely and continue current treatment plan unchanged.    Antibiotics (From admission, onward)      Start     Stop Route Frequency Ordered    03/14/25 0915  ceFEPIme injection 2 g         -- IV Every 8 hours (non-standard times) 03/14/25 0803    03/14/25 0915  azithromycin (ZITHROMAX) 500 mg in 0.9% NaCl 250 mL IVPB (admixture device)         03/19/25 0914 IV Every 24 hours (non-standard times) 03/14/25 0803            Microbiology Results (last 7 days)       Procedure Component Value Units Date/Time    Blood culture x two cultures. Draw prior to antibiotics [[id number]] Collected: 03/14/25 0653    Order Status: Completed Specimen: Blood Updated: 03/15/25 0832     Blood Culture, Routine No Growth to date      No Growth to date    Narrative:      Aerobic and anaerobic  Collection has been rescheduled by MYB at 03/14/2025 00:30 Reason:   Unable to collect 2nd set. Nurse Notified  Collection has been rescheduled by MYB at 03/14/2025 00:30 Reason:   Unable to collect 2nd set. Nurse Notified    Blood culture x two cultures. Draw prior to antibiotics [[id number]] Collected: 03/13/25 2344    Order Status: Completed Specimen: Blood from Peripheral, Hand, Right Updated: 03/15/25 0232     Blood Culture, Routine No Growth to date      No Growth to date    Narrative:      Aerobic and anaerobic    MRSA Screen by PCR [[id number]]  Collected: 03/14/25 1343    Order Status: Completed Specimen: Nasal Swab Updated: 03/14/25 1651     MRSA SCREEN BY PCR Not Detected    Culture, Respiratory with Gram Stain [[id number]]     Order Status: No result Specimen: Respiratory     Respiratory Infection Panel (PCR), Nasopharyngeal [[id number]] Collected: 03/14/25 0902    Order Status: Completed Specimen: Nasopharyngeal Swab Updated: 03/14/25 1103     Respiratory Infection Panel Source NP swab     Adenovirus Not Detected     Coronavirus 229E, Common Cold Virus Not Detected     Coronavirus HKU1, Common Cold Virus Not Detected     Coronavirus NL63, Common Cold Virus Not Detected     Coronavirus OC43, Common Cold Virus Not Detected     Comment: Coronavirus strains 229E, HKU1, NL63, and OC43 can cause the common   cold   and are not associated with the respiratory disease outbreak caused   by  the COVID-19 (SARS-CoV-2 novel Coronavirus) strain.           SARS-CoV2 (COVID-19) Qualitative PCR Not Detected     Human Metapneumovirus Not Detected     Human Rhinovirus/Enterovirus Not Detected     Influenza A Not Detected     Influenza B Not Detected     Parainfluenza Virus 1 Not Detected     Parainfluenza Virus 2 Not Detected     Parainfluenza Virus 3 Not Detected     Parainfluenza Virus 4 Not Detected     Respiratory Syncytial Virus Not Detected     Bordetella Parapertussis (DS0851) Not Detected     Bordetella pertussis (ptxP) Not Detected     Chlamydia pneumoniae Not Detected     Mycoplasma pneumoniae Not Detected     Comment: Respiratory Infection Panel testing performed by Multiplex PCR.       Narrative:      Respiratory Infection Panel source->NP Swab            Empiric cefepime azithromycin

## 2025-03-15 NOTE — SUBJECTIVE & OBJECTIVE
Interval History:   Seen and examined at multidisciplinary rounds, feel better, no fever or chills.  WBC trending down.     Review of Systems   Constitutional:  Positive for fatigue.   Respiratory:  Positive for shortness of breath.      Objective:     Vital Signs (Most Recent):  Temp: 97.5 °F (36.4 °C) (03/15/25 1100)  Pulse: 104 (03/15/25 1116)  Resp: 19 (03/15/25 1151)  BP: (!) 146/90 (03/15/25 0400)  SpO2: 99 % (03/15/25 1116) Vital Signs (24h Range):  Temp:  [96.8 °F (36 °C)-98.3 °F (36.8 °C)] 97.5 °F (36.4 °C)  Pulse:  [] 104  Resp:  [] 19  SpO2:  [74 %-100 %] 99 %  BP: (108-147)/(69-93) 146/90     Weight: 47.9 kg (105 lb 9.6 oz)  Body mass index is 17.57 kg/m².    Intake/Output Summary (Last 24 hours) at 3/15/2025 1238  Last data filed at 3/15/2025 0335  Gross per 24 hour   Intake --   Output 0 ml   Net 0 ml         Physical Exam  Vitals and nursing note reviewed.   Constitutional:       Appearance: He is ill-appearing.      Comments: Severe cachectic   Neck:      Vascular: No JVD.   Cardiovascular:      Rate and Rhythm: Normal rate and regular rhythm.      Heart sounds: Normal heart sounds.   Pulmonary:      Effort: Pulmonary effort is normal.      Comments: Very poor bilateral air entries  Abdominal:      General: Bowel sounds are normal. There is no distension.      Palpations: Abdomen is soft.      Tenderness: There is no abdominal tenderness.      Comments: PEG tube in place   Musculoskeletal:         General: No deformity.   Lymphadenopathy:      Cervical: No cervical adenopathy.   Skin:     Coloration: Skin is not pale.      Findings: No rash.               Significant Labs: All pertinent labs within the past 24 hours have been reviewed.  CBC:   Recent Labs   Lab 03/13/25  2342 03/15/25  0434   WBC 41.54* 19.06*   HGB 11.9* 13.3*   HCT 37.3* 43.0    301     CMP:   Recent Labs   Lab 03/14/25  0443 03/15/25  0434   * 136   K 4.6 4.8   CL 96 102   CO2 29 27   * 107   BUN 22  21   CREATININE 0.8 0.6   CALCIUM 8.6* 9.1   PROT 6.2 6.5   ALBUMIN 3.0* 3.4*   BILITOT 0.4 0.3   ALKPHOS 90 89   AST 21 16   ALT 23 23   ANIONGAP 6* 7*     Troponin:   Recent Labs   Lab 03/14/25  0018 03/14/25  1235   TROPONINIHS 61.7* 8.6       Significant Imaging: I have reviewed all pertinent imaging results/findings within the past 24 hours.  I have reviewed and interpreted all pertinent imaging results/findings within the past 24 hours.    Scheduled Meds:   albuterol-ipratropium  3 mL Nebulization Q4H WAKE    amitriptyline  50 mg Oral QHS    arformoteroL  15 mcg Nebulization BID    azithromycin  500 mg Intravenous Q24H    budesonide  1 mg Nebulization Q12H    ceFEPime IV (PEDS and ADULTS)  2 g Intravenous Q8H    enoxparin  40 mg Subcutaneous Daily    levothyroxine  125 mcg Oral QHS    methylPREDNISolone injection (PEDS and ADULTS)  60 mg Intravenous Q8H    roflumilast  500 mcg Oral Daily    sodium chloride 3%  4 mL Nebulization BID     Continuous Infusions:   0.9% NaCl   Intravenous Continuous 75 mL/hr at 03/15/25 1146 New Bag at 03/15/25 1146     PRN Meds:.  Current Facility-Administered Medications:     morphine, 2 mg, Intravenous, Q4H PRN    ondansetron, 4 mg, Intravenous, Q12H PRN    oxyCODONE, 20 mg, Oral, TID PRN    sodium chloride 0.9%, 3 mL, Intravenous, PRN    Echo  Result Date: 3/14/2025    Left Ventricle: The left ventricle is normal in size. Normal wall thickness. There is normal systolic function with a visually estimated ejection fraction of 65 - 70%. There is normal diastolic function.   Right Ventricle: The right ventricle is normal in size. Systolic function is normal.   IVC/SVC: Normal venous pressure at 3 mmHg.     CTA Chest Non-Coronary (PE Studies)  Result Date: 3/14/2025  CMS MANDATED QUALITY DATA - CT RADIATION - 436 All CT scans at this facility utilize dose modulation, iterative reconstruction, and/or weight based dosing when appropriate to reduce radiation dose to as low as reasonably  achievable. CLINICAL HISTORY: ([id number])71 y/o  (1955) M Pulmonary embolism (PE) suspected, unknown D-dimer; TECHNIQUE: (A#[id number], exam time 3/14/2025 13:36) CTA CHEST NON CORONARY (PE STUDIES) [id number] Axial CT examination of the chest with attention to the pulmonary arteries was performed using contiguous axial images from the diaphragms to the lung apices following the intravenous administration of non-ionic contrast material. Images were reviewed using lung, mediastinal, and bone windows. The study was performed with thin sections with subsequent 3-D reformats/MIP/reconstructions in multiple planes. COMPARISON: Radiograph of the chest from 03/14/2025 CT of the chest from 06/04/2023. FINDINGS: Technically suboptimal exam secondary to motion artifact throughout. CTA PE evaluation: This is a moderate to high quality study for the evaluation of pulmonary embolism secondary to a combination of contrast bolus timing and motion artifact. The pulmonary arteries are normal in appearance without pulmonary emboli noted up to the segmental level, noting the limitations of CT technique for identifying small or isolated subsegmental emboli. CT Chest: Visualized neck: normal Lungs: Mild, upper lobe, centrilobular predominant emphysematous lung disease is present.  Mild biapical pleural thickening/scarring is present.  There is a 4 mm right lower lobe pulmonary nodule (image 179), and additional 4 mm left lower lobe pulmonary nodule (image 255).  Additional smaller punctate nodules may be present, difficult to assess secondary to the underlying motion artifact. Airway: Moderate cylindrical bronchiectasis is present with a dependent lower lobe predominance.  There is a small amount heterogeneous foamy debris in the dependent trachea and mainstem bronchi suggestive of mucous, secretions and/or aspiration. Pleura: There is no pleural effusion. There is no pneumothorax. Cardiovascular: The heart is normal in size. There are  no findings of right heart failure.  Calcified plaque is seen at the aortic arch and in the LAD distribution of the coronary arteries. Mediastinum: No lymphadenopathy is seen. Soft tissues: normal Musculoskeletal: There are mild degenerative changes of the thoracic spine.  There are no suspicious osseous lesions. Esophagus: normal Upper Abdomen: No acute abnormality is seen in the upper abdomen.  There is a PEG tube with balloon inflated in the body of the stomach.     1. No evidence of pulmonary embolism to the segmental arterial level. If there is continued clinical concern, consider further evaluation with lower extremity doppler. 2. Mild bronchiectasis with a central hilar lower lobe predominance. 3.  Minimal scattered punctate nodules present, partially obscured by motion artifact suggesting either minimal infection, atelectasis or scarring.  The largest measurable nodule is 4 mm in diameter, and is unchanged from the previous CT, for which no additional follow-up will be warranted. Electronically signed by: Ashish Sauceda Date:    03/14/2025 Time:    13:41    X-Ray Chest 1 View  Result Date: 3/14/2025  EXAMINATION: XR CHEST 1 VIEW CLINICAL HISTORY: Sepsis; TECHNIQUE: Single frontal view of the chest was performed. COMPARISON: 06/12/2023 FINDINGS: See below     Small bilateral pleural effusions with adjacent airspace disease or atelectasis, left greater than right.  Background of chronic interstitial changes.  Normal heart size. Electronically signed by: Giselle Hou Date:    03/14/2025 Time:    00:45  - pulls last radiology orders

## 2025-03-15 NOTE — PLAN OF CARE
Problem: COPD (Chronic Obstructive Pulmonary Disease)  Goal: Optimal Chronic Illness Coping  Outcome: Progressing  Goal: Optimal Level of Functional [place]  Outcome: Progressing  Goal: Absence of Infection Signs and Symptoms  Outcome: Progressing  Goal: Improved Oral Intake  Outcome: Progressing  Goal: Effective Oxygenation and Ventilation  Outcome: Progressing     Problem: Adult Inpatient Plan of Care  Goal: Plan of Care Review  Outcome: Progressing  Goal: Patient-Specific Goal (Individualized)  Outcome: Progressing  Goal: Absence of Hospital-Acquired Illness or Injury  Outcome: Progressing  Goal: Optimal Comfort and Wellbeing  Outcome: Progressing  Goal: Readiness for Transition of Care  Outcome: Progressing     Problem: Sepsis/Septic Shock  Goal: Optimal Coping  Outcome: Progressing  Goal: Absence of Bleeding  Outcome: Progressing  Goal: Blood Glucose Level Within Targeted Range  Outcome: Progressing  Goal: Absence of Infection Signs and Symptoms  Outcome: Progressing  Goal: Optimal Nutrition Intake  Outcome: Progressing     Problem: Pneumonia  Goal: Fluid Balance  Outcome: Progressing  Goal: Resolution of Infection Signs and Symptoms  Outcome: Progressing  Goal: Effective Oxygenation and Ventilation  Outcome: Progressing     Problem: Enteral Nutrition  Goal: Absence of Aspiration Signs and Symptoms  Outcome: Progressing  Goal: Safe, Effective Therapy Delivery  Outcome: Progressing  Goal: Feeding Tolerance  Outcome: Progressing     Problem: Fall Injury Risk  Goal: Absence of Fall and Fall-Related Injury  Outcome: Progressing     Problem: Skin Injury Risk Increased  Goal: Skin Health and Integrity  Outcome: Progressing

## 2025-03-15 NOTE — ASSESSMENT & PLAN NOTE
Patient's COPD is with exacerbation noted by continued dyspnea and use of accessory muscles for breathing currently.  Patient is currently on COPD Pathway. Continue scheduled inhalers Steroids, Antibiotics, and Supplemental oxygen and monitor respiratory status closely.       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   Improved, cut down the dose of steroids.

## 2025-03-15 NOTE — PLAN OF CARE
03/14/25 1912   Patient Assessment/Suction   Level of Consciousness (AVPU) alert   Respiratory Effort Normal;Unlabored   Expansion/Accessory Muscles/Retractions no use of accessory muscles   All Lung Fields Breath Sounds Anterior:;Lateral:;diminished   Rhythm/Pattern, Respiratory pattern regular   Cough Frequency infrequent   Cough Type productive   Secretions Amount unknown;swallowed   Skin Integrity   $ Wound Care Tech Time 15 min   Area Observed Left;Right;Behind ear;Cheek;Upper lip;Nares   Skin Appearance without discoloration   PRE-TX-O2   Device (Oxygen Therapy) nasal cannula with humidification   $ Is the patient on Low Flow Oxygen? Yes   Flow (L/min) (Oxygen Therapy) 4   Oxygen Concentration (%) 36   SpO2 96 %   Pulse Oximetry Type Continuous   $ Pulse Oximetry - Multiple Charge Pulse Oximetry - Multiple   Pulse 105   Resp (!) 24   Aerosol Therapy   $ Aerosol Therapy Charges Aerosol Treatment  (duoneb)   Daily Review of Necessity (SVN) completed   Respiratory Treatment Status (SVN) given   Treatment Route (SVN) mask;air   Patient Position HOB elevated   Post Treatment Assessment (SVN) breath sounds unchanged   Signs of Intolerance (SVN) none   Breath Sounds Post-Respiratory Treatment   Throughout All Fields Post-Treatment All Fields   Throughout All Fields Post-Treatment no change   Post-treatment Heart Rate (beats/min) 109   Post-treatment Resp Rate (breaths/min) 22   General Safety Checklist   Safety Promotion/Fall Prevention side rails raised   Respiratory Interventions   Cough And Deep Breathing done with encouragement   Ready to Wean/Extubation Screen   FIO2<=50 (chart decimal) 0.36   Education   $ Education Bronchodilator;Oxygen;30 min

## 2025-03-15 NOTE — ASSESSMENT & PLAN NOTE
This patient does have evidence of infective focus  My overall impression is sepsis.  Source: Respiratory  Antibiotics given-   Antibiotics (72h ago, onward)      Start     Stop Route Frequency Ordered    03/14/25 0915  ceFEPIme injection 2 g         -- IV Every 8 hours (non-standard times) 03/14/25 0803    03/14/25 0915  azithromycin (ZITHROMAX) 500 mg in 0.9% NaCl 250 mL IVPB (admixture device)         03/19/25 0914 IV Every 24 hours (non-standard times) 03/14/25 0803          Latest lactate reviewed-  Recent Labs   Lab 03/13/25  2342 03/14/25  0327   LACTATE  --  1.5   POCLAC 2.31*  --      Organ dysfunction indicated by Acute respiratory failure    Fluid challenge we will give fluids    Post- resuscitation assessment Yes - I attest a sepsis perfusion exam was performed within 6 hours of sepsis, severe sepsis, or septic shock presentation, following fluid resuscitation.      Will Not start Pressors- Levophed for MAP of 65  Source control achieved by:     MRSA negative.   Discontinue vancomycin.   CTA of the chest reviewed, no significant pneumonia.  UA negative.   Get CTA abdomen and pelvis to rule out intra-abdominal infectious source.   Continue cefepime and azithromycin.   Continue low rate IV hydration

## 2025-03-15 NOTE — PROGRESS NOTES
[facility] Medicine  Progress Note    Patient Name: Reji Romano  MRN: [id number]  Patient Class: IP- Inpatient   Admission Date: 3/13/2025  Length of Stay: 1 days  Attending Physician: Mor Alfaro MD  Primary Care Provider: Brenda, Provider        Subjective     Principal Problem:<principal problem not specified>        HPI:  This is a 70-year-old  male with a history of severe COPD, chronic hypoxic respiratory failure on 2 L nasal cannula 24/7, severe cachexia with a BMI 18.3,  throat cancer status post chemoradiation with chronic dysphagia on PEG tube feeding presenting here for worsened shortness of breaths for past few days.  Also reported may have altered mental status, however patient is awake alert when I evaluated him.  Patient has severe dysarthria secondary to previous throat cancer, history is obtained from wife at bedside.  Patient apparently has been feel unwell for few days, no sick contacts.  No chest pain.  No fever or chills.  Patient has PEG tube with every feeding/ medication through this.  Patient has is absolutely nothing by mouth.  Patient also has a chest vest on 3% normal saline nebulizer at home.   In the ER, patient is afebrile, hemodynamically stable.  WBC count is 40 K. patient did not received any steroids prior to this as per wife.     Overview/Hospital Course:  No notes on file    70-year-old male with a history of throat cancer status post chemoradiation, chronic dysphagia/ dysarthria PEG tube feeding, severe emphysema/ bronchiectasis with chronic Pseudomonas colonization is admitted for COPD with acute exacerbation, severe sepsis, WBC 41K with elevated procalcitonin.  Started on empiric vancomycin/ cefepime/ azithromycin, vancomycin later discontinued since MRSA screening is negative.  CTA of the chest is negative for pneumonia/ pulmonary embolism.  WBC trending down, pending CTA abdomen and pelvis to rule out infectious source.     Interval  History:   Seen and examined at multidisciplinary rounds, feel better, no fever or chills.  WBC trending down.     Review of Systems   Constitutional:  Positive for fatigue.   Respiratory:  Positive for shortness of breath.      Objective:     Vital Signs (Most Recent):  Temp: 97.5 °F (36.4 °C) (03/15/25 1100)  Pulse: 104 (03/15/25 1116)  Resp: 19 (03/15/25 1151)  BP: (!) 146/90 (03/15/25 0400)  SpO2: 99 % (03/15/25 1116) Vital Signs (24h Range):  Temp:  [96.8 °F (36 °C)-98.3 °F (36.8 °C)] 97.5 °F (36.4 °C)  Pulse:  [] 104  Resp:  [] 19  SpO2:  [74 %-100 %] 99 %  BP: (108-147)/(69-93) 146/90     Weight: 47.9 kg (105 lb 9.6 oz)  Body mass index is 17.57 kg/m².    Intake/Output Summary (Last 24 hours) at 3/15/2025 1238  Last data filed at 3/15/2025 0335  Gross per 24 hour   Intake --   Output 0 ml   Net 0 ml         Physical Exam  Vitals and nursing note reviewed.   Constitutional:       Appearance: He is ill-appearing.      Comments: Severe cachectic   Neck:      Vascular: No JVD.   Cardiovascular:      Rate and Rhythm: Normal rate and regular rhythm.      Heart sounds: Normal heart sounds.   Pulmonary:      Effort: Pulmonary effort is normal.      Comments: Very poor bilateral air entries  Abdominal:      General: Bowel sounds are normal. There is no distension.      Palpations: Abdomen is soft.      Tenderness: There is no abdominal tenderness.      Comments: PEG tube in place   Musculoskeletal:         General: No deformity.   Lymphadenopathy:      Cervical: No cervical adenopathy.   Skin:     Coloration: Skin is not pale.      Findings: No rash.               Significant Labs: All pertinent labs within the past 24 hours have been reviewed.  CBC:   Recent Labs   Lab 03/13/25  2342 03/15/25  0434   WBC 41.54* 19.06*   HGB 11.9* 13.3*   HCT 37.3* 43.0    301     CMP:   Recent Labs   Lab 03/14/25  0443 03/15/25  0434   * 136   K 4.6 4.8   CL 96 102   CO2 29 27   * 107   BUN 22 21    CREATININE 0.8 0.6   CALCIUM 8.6* 9.1   PROT 6.2 6.5   ALBUMIN 3.0* 3.4*   BILITOT 0.4 0.3   ALKPHOS 90 89   AST 21 16   ALT 23 23   ANIONGAP 6* 7*     Troponin:   Recent Labs   Lab 03/14/25  0018 03/14/25  1235   TROPONINIHS 61.7* 8.6       Significant Imaging: I have reviewed all pertinent imaging results/findings within the past 24 hours.  I have reviewed and interpreted all pertinent imaging results/findings within the past 24 hours.    Scheduled Meds:   albuterol-ipratropium  3 mL Nebulization Q4H WAKE    amitriptyline  50 mg Oral QHS    arformoteroL  15 mcg Nebulization BID    azithromycin  500 mg Intravenous Q24H    budesonide  1 mg Nebulization Q12H    ceFEPime IV (PEDS and ADULTS)  2 g Intravenous Q8H    enoxparin  40 mg Subcutaneous Daily    levothyroxine  125 mcg Oral QHS    methylPREDNISolone injection (PEDS and ADULTS)  60 mg Intravenous Q8H    roflumilast  500 mcg Oral Daily    sodium chloride 3%  4 mL Nebulization BID     Continuous Infusions:   0.9% NaCl   Intravenous Continuous 75 mL/hr at 03/15/25 1146 New Bag at 03/15/25 1146     PRN Meds:.  Current Facility-Administered Medications:     morphine, 2 mg, Intravenous, Q4H PRN    ondansetron, 4 mg, Intravenous, Q12H PRN    oxyCODONE, 20 mg, Oral, TID PRN    sodium chloride 0.9%, 3 mL, Intravenous, PRN    Echo  Result Date: 3/14/2025    Left Ventricle: The left ventricle is normal in size. Normal wall thickness. There is normal systolic function with a visually estimated ejection fraction of 65 - 70%. There is normal diastolic function.   Right Ventricle: The right ventricle is normal in size. Systolic function is normal.   IVC/SVC: Normal venous pressure at 3 mmHg.     CTA Chest Non-Coronary (PE Studies)  Result Date: 3/14/2025  CMS MANDATED QUALITY DATA - CT RADIATION - 436 All CT scans at this facility utilize dose modulation, iterative reconstruction, and/or weight based dosing when appropriate to reduce radiation dose to as low as reasonably  achievable. CLINICAL HISTORY: ([id number])71 y/o  (1955) M Pulmonary embolism (PE) suspected, unknown D-dimer; TECHNIQUE: (A#[id number], exam time 3/14/2025 13:36) CTA CHEST NON CORONARY (PE STUDIES) [id number] Axial CT examination of the chest with attention to the pulmonary arteries was performed using contiguous axial images from the diaphragms to the lung apices following the intravenous administration of non-ionic contrast material. Images were reviewed using lung, mediastinal, and bone windows. The study was performed with thin sections with subsequent 3-D reformats/MIP/reconstructions in multiple planes. COMPARISON: Radiograph of the chest from 03/14/2025 CT of the chest from 06/04/2023. FINDINGS: Technically suboptimal exam secondary to motion artifact throughout. CTA PE evaluation: This is a moderate to high quality study for the evaluation of pulmonary embolism secondary to a combination of contrast bolus timing and motion artifact. The pulmonary arteries are normal in appearance without pulmonary emboli noted up to the segmental level, noting the limitations of CT technique for identifying small or isolated subsegmental emboli. CT Chest: Visualized neck: normal Lungs: Mild, upper lobe, centrilobular predominant emphysematous lung disease is present.  Mild biapical pleural thickening/scarring is present.  There is a 4 mm right lower lobe pulmonary nodule (image 179), and additional 4 mm left lower lobe pulmonary nodule (image 255).  Additional smaller punctate nodules may be present, difficult to assess secondary to the underlying motion artifact. Airway: Moderate cylindrical bronchiectasis is present with a dependent lower lobe predominance.  There is a small amount heterogeneous foamy debris in the dependent trachea and mainstem bronchi suggestive of mucous, secretions and/or aspiration. Pleura: There is no pleural effusion. There is no pneumothorax. Cardiovascular: The heart is normal in size. There are  no findings of right heart failure.  Calcified plaque is seen at the aortic arch and in the LAD distribution of the coronary arteries. Mediastinum: No lymphadenopathy is seen. Soft tissues: normal Musculoskeletal: There are mild degenerative changes of the thoracic spine.  There are no suspicious osseous lesions. Esophagus: normal Upper Abdomen: No acute abnormality is seen in the upper abdomen.  There is a PEG tube with balloon inflated in the body of the stomach.     1. No evidence of pulmonary embolism to the segmental arterial level. If there is continued clinical concern, consider further evaluation with lower extremity doppler. 2. Mild bronchiectasis with a central hilar lower lobe predominance. 3.  Minimal scattered punctate nodules present, partially obscured by motion artifact suggesting either minimal infection, atelectasis or scarring.  The largest measurable nodule is 4 mm in diameter, and is unchanged from the previous CT, for which no additional follow-up will be warranted. Electronically signed by: Ashish Sauceda Date:    03/14/2025 Time:    13:41    X-Ray Chest 1 View  Result Date: 3/14/2025  EXAMINATION: XR CHEST 1 VIEW CLINICAL HISTORY: Sepsis; TECHNIQUE: Single frontal view of the chest was performed. COMPARISON: 06/12/2023 FINDINGS: See below     Small bilateral pleural effusions with adjacent airspace disease or atelectasis, left greater than right.  Background of chronic interstitial changes.  Normal heart size. Electronically signed by: Giselle Hou Date:    03/14/2025 Time:    00:45  - pulls last radiology orders        Assessment & Plan  Laryngeal cancer s/p surgery and chemo    Aware, patient has chronic PEG tube feeding.  Nothing by mouth strictly.   Consult dietitian for tube feeding.   Patient has chronic dysphagia/ dysarthria.   COPD with acute exacerbation  Patient's COPD is with exacerbation noted by continued dyspnea and use of accessory muscles for breathing currently.   Patient is currently on COPD Pathway. Continue scheduled inhalers Steroids, Antibiotics, and Supplemental oxygen and monitor respiratory status closely.       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   Improved, cut down the dose of steroids.   PEG tube     Aware, consult dietitian  Bronchiectasis  Continue home vest therapy.       Sepsis  This patient does have evidence of infective focus  My overall impression is sepsis.  Source: Respiratory  Antibiotics given-   Antibiotics (72h ago, onward)      Start     Stop Route Frequency Ordered    03/14/25 0915  ceFEPIme injection 2 g         -- IV Every 8 hours (non-standard times) 03/14/25 0803    03/14/25 0915  azithromycin (ZITHROMAX) 500 mg in 0.9% NaCl 250 mL IVPB (admixture device)         03/19/25 0914 IV Every 24 hours (non-standard times) 03/14/25 0803          Latest lactate reviewed-  Recent Labs   Lab 03/13/25  2342 03/14/25  0327   LACTATE  --  1.5   POCLAC 2.31*  --      Organ dysfunction indicated by Acute respiratory failure    Fluid challenge we will give fluids    Post- resuscitation assessment Yes - I attest a sepsis perfusion exam was performed within 6 hours of sepsis, severe sepsis, or septic shock presentation, following fluid resuscitation.      Will Not start Pressors- Levophed for MAP of 65  Source control achieved by:     MRSA negative.   Discontinue vancomycin.   CTA of the chest reviewed, no significant pneumonia.  UA negative.   Get CTA abdomen and pelvis to rule out intra-abdominal infectious source.   Continue cefepime and azithromycin.   Continue low rate IV hydration  Possible Community acquired pneumonia  Patient has a diagnosis of pneumonia. The cause of the pneumonia is suspected to be bacterial in etiology but organism is not known. The pneumonia is stable. The patient has the following signs/symptoms of pneumonia: persistent hypoxia  and cough. The patient does have a current oxygen requirement and the  patient does have a home oxygen requirement. I have reviewed the pertinent imaging. The following cultures have been collected: Blood cultures and Sputum culture The culture results are listed below.     Current antimicrobial regimen consists of the antibiotics listed below. Will monitor patient closely and continue current treatment plan unchanged.    Antibiotics (From admission, onward)      Start     Stop Route Frequency Ordered    03/14/25 0915  ceFEPIme injection 2 g         -- IV Every 8 hours (non-standard times) 03/14/25 0803    03/14/25 0915  azithromycin (ZITHROMAX) 500 mg in 0.9% NaCl 250 mL IVPB (admixture device)         03/19/25 0914 IV Every 24 hours (non-standard times) 03/14/25 0803            Microbiology Results (last 7 days)       Procedure Component Value Units Date/Time    Blood culture x two cultures. Draw prior to antibiotics [[id number]] Collected: 03/14/25 0653    Order Status: Completed Specimen: Blood Updated: 03/15/25 0832     Blood Culture, Routine No Growth to date      No Growth to date    Narrative:      Aerobic and anaerobic  Collection has been rescheduled by MYB at 03/14/2025 00:30 Reason:   Unable to collect 2nd set. Nurse Notified  Collection has been rescheduled by MYB at 03/14/2025 00:30 Reason:   Unable to collect 2nd set. Nurse Notified    Blood culture x two cultures. Draw prior to antibiotics [[id number]] Collected: 03/13/25 2344    Order Status: Completed Specimen: Blood from Peripheral, Hand, Right Updated: 03/15/25 0232     Blood Culture, Routine No Growth to date      No Growth to date    Narrative:      Aerobic and anaerobic    MRSA Screen by PCR [[id number]] Collected: 03/14/25 1343    Order Status: Completed Specimen: Nasal Swab Updated: 03/14/25 1651     MRSA SCREEN BY PCR Not Detected    Culture, Respiratory with Gram Stain [[id number]]     Order Status: No result Specimen: Respiratory     Respiratory Infection Panel (PCR), Nasopharyngeal [[id number]] Collected:  03/14/25 0902    Order Status: Completed Specimen: Nasopharyngeal Swab Updated: 03/14/25 1103     Respiratory Infection Panel Source NP swab     Adenovirus Not Detected     Coronavirus 229E, Common Cold Virus Not Detected     Coronavirus HKU1, Common Cold Virus Not Detected     Coronavirus NL63, Common Cold Virus Not Detected     Coronavirus OC43, Common Cold Virus Not Detected     Comment: Coronavirus strains 229E, HKU1, NL63, and OC43 can cause the common   cold   and are not associated with the respiratory disease outbreak caused   by  the COVID-19 (SARS-CoV-2 novel Coronavirus) strain.           SARS-CoV2 (COVID-19) Qualitative PCR Not Detected     Human Metapneumovirus Not Detected     Human Rhinovirus/Enterovirus Not Detected     Influenza A Not Detected     Influenza B Not Detected     Parainfluenza Virus 1 Not Detected     Parainfluenza Virus 2 Not Detected     Parainfluenza Virus 3 Not Detected     Parainfluenza Virus 4 Not Detected     Respiratory Syncytial Virus Not Detected     Bordetella Parapertussis (GE6982) Not Detected     Bordetella pertussis (ptxP) Not Detected     Chlamydia pneumoniae Not Detected     Mycoplasma pneumoniae Not Detected     Comment: Respiratory Infection Panel testing performed by Multiplex PCR.       Narrative:      Respiratory Infection Panel source->NP Swab            Empiric cefepime azithromycin  Severe malnutrition  Nutrition consulted. Most recent weight and BMI monitored-     Measurements:  Wt Readings from Last 1 Encounters:   03/14/25 47.9 kg (105 lb 9.6 oz)   Body mass index is 17.57 kg/m².    Patient has been screened and assessed by RD.    Malnutrition Type:  Context:    Level:      Malnutrition Characteristic Summary:       Interventions/Recommendations (treatment strategy):  1.) Will begin Glucerna 1.5-5 cartons daily to provide 1780 kcals, 100gm protein and 900mL of free water. Suggest FWF 100mL after each feed. TF to meet 100% EEN and EPN. 2.) If kitchen out of  Glucerna, suggest Diabetisource-6 cans daily to provide 1800 kcals, 90gm protein and 1224mL of free water. Suggest FWF 80mL after each feed. TF to meet 100% EEN and EPN. 3.) RD to perform NFPE at follow up.    VTE Risk Mitigation (From admission, onward)           Ordered     enoxaparin injection 40 mg  Daily         03/14/25 0803     IP VTE HIGH RISK PATIENT  Once         03/14/25 0803     Place sequential compression device  Until discontinued         03/14/25 0803                    Discharge Planning   JENNA: 3/18/2025     Code Status: Full Code   Medical Readiness for Discharge Date:   Discharge Plan A: Home                        Mor Alfaro MD  Department of Hospital Medicine   [facility]

## 2025-03-15 NOTE — PROGRESS NOTES
Pharmacy Consult Discontinuation: Vancomycin    Reji Romano [id number] is a 70 y.o. male was consulted for vancomycin pharmacotherapy management by pharmacy.    Pharmacy consult for vancomycin dosing is no longer required.  Vancomycin was discontinued 03/15/2025.    Thank you for allowing us to participate in this patient's care. Should you have any questions or concerns please feel free to contact the pharmacy department at [phone].    Gabino Estrella, PharmD

## 2025-03-15 NOTE — PLAN OF CARE
Problem: COPD (Chronic Obstructive Pulmonary Disease)  Goal: Optimal Chronic Illness Coping  Outcome: Progressing  Goal: Optimal Level of Functional Echo  Outcome: Progressing  Goal: Absence of Infection Signs and Symptoms  Outcome: Progressing  Goal: Improved Oral Intake  Outcome: Progressing  Goal: Effective Oxygenation and Ventilation  Outcome: Progressing     Problem: Adult Inpatient Plan of Care  Goal: Plan of Care Review  Outcome: Progressing  Goal: Patient-Specific Goal (Individualized)  Outcome: Progressing  Goal: Absence of Hospital-Acquired Illness or Injury  Outcome: Progressing  Goal: Optimal Comfort and Wellbeing  Outcome: Progressing  Goal: Readiness for Transition of Care  Outcome: Progressing     Problem: Sepsis/Septic Shock  Goal: Optimal Coping  Outcome: Progressing  Goal: Absence of Bleeding  Outcome: Progressing  Goal: Blood Glucose Level Within Targeted Range  Outcome: Progressing  Goal: Absence of Infection Signs and Symptoms  Outcome: Progressing  Goal: Optimal Nutrition Intake  Outcome: Progressing     Problem: Pneumonia  Goal: Fluid Balance  Outcome: Progressing  Goal: Resolution of Infection Signs and Symptoms  Outcome: Progressing  Goal: Effective Oxygenation and Ventilation  Outcome: Progressing     Problem: Enteral Nutrition  Goal: Absence of Aspiration Signs and Symptoms  Outcome: Progressing  Goal: Safe, Effective Therapy Delivery  Outcome: Progressing  Goal: Feeding Tolerance  Outcome: Progressing     Problem: Fall Injury Risk  Goal: Absence of Fall and Fall-Related Injury  Outcome: Progressing

## 2025-03-15 NOTE — HOSPITAL COURSE
70-year-old male with a history of throat cancer status post chemoradiation, chronic dysphagia/ dysarthria PEG tube feeding, COPD on 2 L home ,chronic Pseudomonas colonization came in with worsening SOB and admitted for acute on chronic hypoxic respiratory failure likely multifactorial including COPD exacerbation wth possible pneumonia (elevated processioning).   Patient was maintained on intravenous cefepime and metronidazole. CT abdomen showed possible colitis and patient was started on antibiotics.  CTA of the chest was negative for PE.  Slowly wean off steroids as needed for COPD exacerbation.  Pulmonary Medicine and palliative medicine teams have been consulted.  Patient required use of BiPAP noninvasive Ventilatory therapy to improve work of breathing. Percussion vest therapy continued.  Sputum culture finalized Pseudomonas species.

## 2025-03-16 PROBLEM — J96.21 ACUTE ON CHRONIC RESPIRATORY FAILURE WITH HYPOXIA: Status: ACTIVE | Noted: 2025-03-16

## 2025-03-16 LAB
ALBUMIN SERPL BCP-MCNC: 2.9 G/DL (ref 3.5–5.2)
ALP SERPL-CCNC: 80 U/L (ref 55–135)
ALT SERPL W/O P-5'-P-CCNC: 23 U/L (ref 10–44)
ANION GAP SERPL CALC-SCNC: 7 MMOL/L (ref 8–16)
AST SERPL-CCNC: 15 U/L (ref 10–40)
BASOPHILS # BLD AUTO: 0.03 K/UL (ref 0–0.2)
BASOPHILS NFR BLD: 0.1 % (ref 0–1.9)
BILIRUB SERPL-MCNC: 0.3 MG/DL (ref 0.1–1)
BUN SERPL-MCNC: 22 MG/DL (ref 8–23)
CALCIUM SERPL-MCNC: 8.7 MG/DL (ref 8.7–10.5)
CHLORIDE SERPL-SCNC: 107 MMOL/L (ref 95–110)
CO2 SERPL-SCNC: 28 MMOL/L (ref 23–29)
CREAT SERPL-MCNC: 0.5 MG/DL (ref 0.5–1.4)
DIFFERENTIAL METHOD BLD: ABNORMAL
EOSINOPHIL # BLD AUTO: 0 K/UL (ref 0–0.5)
EOSINOPHIL NFR BLD: 0 % (ref 0–8)
ERYTHROCYTE [DISTWIDTH] IN BLOOD BY AUTOMATED COUNT: 14 % (ref 11.5–14.5)
EST. GFR  (NO RACE VARIABLE): >60 ML/MIN/1.73 M^2
GLUCOSE SERPL-MCNC: 124 MG/DL (ref 70–110)
HCT VFR BLD AUTO: 37.7 % (ref 40–54)
HGB BLD-MCNC: 11.6 G/DL (ref 14–18)
IMM GRANULOCYTES # BLD AUTO: 0.18 K/UL (ref 0–0.04)
IMM GRANULOCYTES NFR BLD AUTO: 0.7 % (ref 0–0.5)
LYMPHOCYTES # BLD AUTO: 0.3 K/UL (ref 1–4.8)
LYMPHOCYTES NFR BLD: 1.2 % (ref 18–48)
MAGNESIUM SERPL-MCNC: 2.2 MG/DL (ref 1.6–2.6)
MCH RBC QN AUTO: 28.6 PG (ref 27–31)
MCHC RBC AUTO-ENTMCNC: 30.8 G/DL (ref 32–36)
MCV RBC AUTO: 93 FL (ref 82–98)
MONOCYTES # BLD AUTO: 0.6 K/UL (ref 0.3–1)
MONOCYTES NFR BLD: 2.2 % (ref 4–15)
NEUTROPHILS # BLD AUTO: 26.3 K/UL (ref 1.8–7.7)
NEUTROPHILS NFR BLD: 95.8 % (ref 38–73)
NRBC BLD-RTO: 0 /100 WBC
PHOSPHATE SERPL-MCNC: 3.4 MG/DL (ref 2.7–4.5)
PLATELET # BLD AUTO: 358 K/UL (ref 150–450)
PLATELET BLD QL SMEAR: ABNORMAL
PMV BLD AUTO: 10.6 FL (ref 9.2–12.9)
POTASSIUM SERPL-SCNC: 4.1 MMOL/L (ref 3.5–5.1)
PROT SERPL-MCNC: 5.6 G/DL (ref 6–8.4)
RBC # BLD AUTO: 4.06 M/UL (ref 4.6–6.2)
SODIUM SERPL-SCNC: 142 MMOL/L (ref 136–145)
WBC # BLD AUTO: 27.44 K/UL (ref 3.9–12.7)

## 2025-03-16 PROCEDURE — 84100 ASSAY OF PHOSPHORUS: CPT | Performed by: HOSPITALIST

## 2025-03-16 PROCEDURE — 83735 ASSAY OF MAGNESIUM: CPT | Performed by: HOSPITALIST

## 2025-03-16 PROCEDURE — 36415 COLL VENOUS BLD VENIPUNCTURE: CPT | Performed by: HOSPITALIST

## 2025-03-16 PROCEDURE — 21000000 HC CCU ICU ROOM CHARGE

## 2025-03-16 PROCEDURE — 85025 COMPLETE CBC W/AUTO DIFF WBC: CPT | Performed by: HOSPITALIST

## 2025-03-16 PROCEDURE — 94668 MNPJ CHEST WALL SBSQ: CPT

## 2025-03-16 PROCEDURE — 94640 AIRWAY INHALATION TREATMENT: CPT

## 2025-03-16 PROCEDURE — 94664 DEMO&/EVAL PT USE INHALER: CPT

## 2025-03-16 PROCEDURE — 99900031 HC PATIENT EDUCATION (STAT)

## 2025-03-16 PROCEDURE — 25000242 PHARM REV CODE 250 ALT 637 W/ HCPCS: Performed by: HOSPITALIST

## 2025-03-16 PROCEDURE — 80053 COMPREHEN METABOLIC PANEL: CPT | Performed by: HOSPITALIST

## 2025-03-16 PROCEDURE — 63600175 PHARM REV CODE 636 W HCPCS: Mod: JZ,TB | Performed by: HOSPITALIST

## 2025-03-16 PROCEDURE — 94667 MNPJ CHEST WALL 1ST: CPT

## 2025-03-16 PROCEDURE — C1751 CATH, INF, PER/CENT/MIDLINE: HCPCS

## 2025-03-16 PROCEDURE — 99900035 HC TECH TIME PER 15 MIN (STAT)

## 2025-03-16 PROCEDURE — 25000003 PHARM REV CODE 250: Performed by: HOSPITALIST

## 2025-03-16 PROCEDURE — 27000221 HC OXYGEN, UP TO 24 HOURS

## 2025-03-16 PROCEDURE — 63600175 PHARM REV CODE 636 W HCPCS: Mod: JZ,TB | Performed by: FAMILY MEDICINE

## 2025-03-16 PROCEDURE — 36410 VNPNXR 3YR/> PHY/QHP DX/THER: CPT

## 2025-03-16 PROCEDURE — 94761 N-INVAS EAR/PLS OXIMETRY MLT: CPT

## 2025-03-16 RX ORDER — SODIUM CHLORIDE 0.9 % (FLUSH) 0.9 %
10 SYRINGE (ML) INJECTION EVERY 12 HOURS PRN
Status: DISCONTINUED | OUTPATIENT
Start: 2025-03-16 | End: 2025-03-20 | Stop reason: HOSPADM

## 2025-03-16 RX ORDER — METRONIDAZOLE 500 MG/100ML
500 INJECTION, SOLUTION INTRAVENOUS
Status: DISCONTINUED | OUTPATIENT
Start: 2025-03-16 | End: 2025-03-20 | Stop reason: HOSPADM

## 2025-03-16 RX ORDER — HYDROXYZINE HYDROCHLORIDE 25 MG/1
25 TABLET, FILM COATED ORAL 2 TIMES DAILY PRN
Status: DISCONTINUED | OUTPATIENT
Start: 2025-03-16 | End: 2025-03-20 | Stop reason: HOSPADM

## 2025-03-16 RX ADMIN — ROFLUMILAST 500 MCG: 500 TABLET ORAL at 09:03

## 2025-03-16 RX ADMIN — IPRATROPIUM BROMIDE AND ALBUTEROL SULFATE 3 ML: .5; 3 SOLUTION RESPIRATORY (INHALATION) at 03:03

## 2025-03-16 RX ADMIN — SODIUM CHLORIDE SOLN NEBU 3% 4 ML: 3 NEBU SOLN at 07:03

## 2025-03-16 RX ADMIN — OXYCODONE HYDROCHLORIDE 20 MG: 10 TABLET ORAL at 05:03

## 2025-03-16 RX ADMIN — OXYCODONE HYDROCHLORIDE 20 MG: 10 TABLET ORAL at 08:03

## 2025-03-16 RX ADMIN — METHYLPREDNISOLONE SODIUM SUCCINATE 40 MG: 40 INJECTION, POWDER, FOR SOLUTION INTRAMUSCULAR; INTRAVENOUS at 09:03

## 2025-03-16 RX ADMIN — CEFEPIME 2 G: 2 INJECTION, POWDER, FOR SOLUTION INTRAVENOUS at 02:03

## 2025-03-16 RX ADMIN — OXYCODONE HYDROCHLORIDE 20 MG: 10 TABLET ORAL at 03:03

## 2025-03-16 RX ADMIN — METRONIDAZOLE 500 MG: 500 INJECTION, SOLUTION INTRAVENOUS at 09:03

## 2025-03-16 RX ADMIN — ARFORMOTEROL TARTRATE 15 MCG: 15 SOLUTION RESPIRATORY (INHALATION) at 07:03

## 2025-03-16 RX ADMIN — CEFEPIME 2 G: 2 INJECTION, POWDER, FOR SOLUTION INTRAVENOUS at 09:03

## 2025-03-16 RX ADMIN — METHYLPREDNISOLONE SODIUM SUCCINATE 60 MG: 40 INJECTION, POWDER, FOR SOLUTION INTRAMUSCULAR; INTRAVENOUS at 05:03

## 2025-03-16 RX ADMIN — METRONIDAZOLE 500 MG: 500 INJECTION, SOLUTION INTRAVENOUS at 02:03

## 2025-03-16 RX ADMIN — LEVOTHYROXINE SODIUM 125 MCG: 0.1 TABLET ORAL at 08:03

## 2025-03-16 RX ADMIN — IPRATROPIUM BROMIDE AND ALBUTEROL SULFATE 3 ML: .5; 3 SOLUTION RESPIRATORY (INHALATION) at 11:03

## 2025-03-16 RX ADMIN — BUDESONIDE INHALATION 1 MG: 0.5 SUSPENSION RESPIRATORY (INHALATION) at 07:03

## 2025-03-16 RX ADMIN — IPRATROPIUM BROMIDE AND ALBUTEROL SULFATE 3 ML: .5; 3 SOLUTION RESPIRATORY (INHALATION) at 08:03

## 2025-03-16 RX ADMIN — BUDESONIDE INHALATION 1 MG: 0.5 SUSPENSION RESPIRATORY (INHALATION) at 08:03

## 2025-03-16 RX ADMIN — ARFORMOTEROL TARTRATE 15 MCG: 15 SOLUTION RESPIRATORY (INHALATION) at 08:03

## 2025-03-16 RX ADMIN — SODIUM CHLORIDE SOLN NEBU 3% 4 ML: 3 NEBU SOLN at 08:03

## 2025-03-16 RX ADMIN — ENOXAPARIN SODIUM 40 MG: 40 INJECTION SUBCUTANEOUS at 04:03

## 2025-03-16 RX ADMIN — METHYLPREDNISOLONE SODIUM SUCCINATE 40 MG: 40 INJECTION, POWDER, FOR SOLUTION INTRAMUSCULAR; INTRAVENOUS at 02:03

## 2025-03-16 RX ADMIN — AMITRIPTYLINE HYDROCHLORIDE 50 MG: 25 TABLET, FILM COATED ORAL at 08:03

## 2025-03-16 RX ADMIN — SODIUM CHLORIDE: 9 INJECTION, SOLUTION INTRAVENOUS at 03:03

## 2025-03-16 RX ADMIN — IPRATROPIUM BROMIDE AND ALBUTEROL SULFATE 3 ML: .5; 3 SOLUTION RESPIRATORY (INHALATION) at 07:03

## 2025-03-16 NOTE — ASSESSMENT & PLAN NOTE
This patient does have evidence of infective focus  My overall impression is sepsis.  Source: Respiratory  Antibiotics given-   Antibiotics (72h ago, onward)      Start     Stop Route Frequency Ordered    03/16/25 0945  metronidazole IVPB 500 mg         -- IV Every 8 hours (non-standard times) 03/16/25 0842    03/14/25 0915  ceFEPIme injection 2 g         -- IV Every 8 hours (non-standard times) 03/14/25 0803          Latest lactate reviewed-  Recent Labs   Lab 03/13/25  2342 03/14/25  0327   LACTATE  --  1.5   POCLAC 2.31*  --      Organ dysfunction indicated by Acute respiratory failure    Fluid challenge we will give fluids    Post- resuscitation assessment Yes - I attest a sepsis perfusion exam was performed within 6 hours of sepsis, severe sepsis, or septic shock presentation, following fluid resuscitation.      Will Not start Pressors- Levophed for MAP of 65  Source control achieved by:     MRSA negative.   Discontinue vancomycin.   CTA of the chest reviewed, no significant pneumonia.  UA negative.   Get CTA abdomen and pelvis to rule out intra-abdominal infectious source.   Continue cefepime and azithromycin.   Continue low rate IV hydration

## 2025-03-16 NOTE — PLAN OF CARE
03/15/25 1851   Patient Assessment/Suction   Level of Consciousness (AVPU) alert   Respiratory Effort Normal;Unlabored   Expansion/Accessory Muscles/Retractions no use of accessory muscles   All Lung Fields Breath Sounds Anterior:;Lateral:;coarse;crackles   Rhythm/Pattern, Respiratory pattern regular   Cough Frequency infrequent   Cough Type productive   Secretions Amount moderate   Secretions Color brown   Secretions Characteristics thick   Skin Integrity   $ Wound Care Tech Time 15 min   Area Observed Left;Right;Behind ear;Cheek;Upper lip;Nares   Skin Appearance without discoloration   PRE-TX-O2   Device (Oxygen Therapy) nasal cannula with humidification   $ Is the patient on Low Flow Oxygen? Yes   Flow (L/min) (Oxygen Therapy) 4   Oxygen Concentration (%) 36   SpO2 95 %   Pulse Oximetry Type Continuous   $ Pulse Oximetry - Multiple Charge Pulse Oximetry - Multiple   Pulse (!) 116   Resp (!) 22   Aerosol Therapy   $ Aerosol Therapy Charges Aerosol Treatment  (duoneb)   Daily Review of Necessity (SVN) completed   Respiratory Treatment Status (SVN) given   Treatment Route (SVN) mask;air   Patient Position HOB elevated   Post Treatment Assessment (SVN) breath sounds unchanged   Signs of Intolerance (SVN) none   Breath Sounds Post-Respiratory Treatment   Throughout All Fields Post-Treatment All Fields   Throughout All Fields Post-Treatment no change   Post-treatment Heart Rate (beats/min) 117   Post-treatment Resp Rate (breaths/min) 21   Vibratory PEP Therapy   $ Vibratory PEP Charges Aerobika Therapy   $ Vibratory PEP Equipment Aerobika Equipment   $ Vibratory PEP Tech Time Charges 15 min   Daily Review of Necessity (PEP Therapy) completed   Type (PEP Therapy) vibratory/oscillatory   Device (PEP Therapy) flutter   Route (PEP Therapy) mouthpiece   Breaths per Cycle (PEP Therapy) 10   Cycles (PEP Therapy) 1   Settings (PEP Therapy) PEP 5   Patient Position (PEP Therapy) HOB elevated   Post Treatment Assessment (PEP)  breath sounds unchanged   Signs of Intolerance (PEP Therapy) none   Chest Physiotherapy   Method (CPT) other (see comments)  (Vest)   High Frequency Chest Compression Vest   $ Chest Compression Charges Vest - Equipment   Daily Review of Necessity (HFCCV) completed   Vest Type (HFCCV) chest wrap vest   Frequency Type (HFCCV) single frequency   Single Frequency (Hz) 8   Pressure (cm H20) 5   Duration (HFCCV) 10 mins   Patient Position (HFCCV) HOB elevated   Post Treatment Assessment (HFCCV) breath sounds unchanged   Signs of Intolerance (HFCCV) none   General Safety Checklist   Safety Promotion/Fall Prevention side rails raised   Respiratory Interventions   Cough And Deep Breathing done with encouragement   Ready to Wean/Extubation Screen   FIO2<=50 (chart decimal) 0.36   Education   $ Education Bronchodilator;Oxygen;PEP Therapy;Other (see comment);30 min  (VEST)

## 2025-03-16 NOTE — CARE UPDATE
03/16/25 0737   Patient Assessment/Suction   Level of Consciousness (AVPU) alert   Respiratory Effort Normal;Unlabored   Expansion/Accessory Muscles/Retractions no use of accessory muscles   All Lung Fields Breath Sounds coarse   Rhythm/Pattern, Respiratory pattern regular   Cough Frequency infrequent   Cough Type congested   PRE-TX-O2   Device (Oxygen Therapy) nasal cannula with humidification   $ Is the patient on Low Flow Oxygen? Yes   Flow (L/min) (Oxygen Therapy) 3  (decreased from 3.5l to wean)   SpO2 99 %   Pulse Oximetry Type Continuous   $ Pulse Oximetry - Multiple Charge Pulse Oximetry - Multiple   Pulse 98   Resp (!) 22   Aerosol Therapy   $ Aerosol Therapy Charges Aerosol Treatment   Daily Review of Necessity (SVN) completed   Respiratory Treatment Status (SVN) given   Treatment Route (SVN) mask;air   Patient Position HOB elevated   Post Treatment Assessment (SVN) breath sounds unchanged   Signs of Intolerance (SVN) none   Breath Sounds Post-Respiratory Treatment   Throughout All Fields Post-Treatment All Fields   Throughout All Fields Post-Treatment no change   Post-treatment Heart Rate (beats/min) 104   Post-treatment Resp Rate (breaths/min) 18   Vibratory PEP Therapy   Daily Review of Necessity (PEP Therapy) other (see comments)  (refused)   Route (PEP Therapy) refused   Chest Physiotherapy   $ Chest Physiotherapy Charges Subsequent   Daily Review of Necessity (CPT) other (see comments)  (patient refused after 2 mins)   Type (CPT) percussion   Method (CPT) other (see comments)  (vest)   Patient Position (CPT) HOB elevated   Lung Fields (CPT) MIRIAM (left upper lobe);LLL (left lower lobe);RUL (right upper lobe);RML (right middle lobe);RLL (right lower lobe);Anterior:;Posterior:;Lateral:   Duration per Field (CPT) 2 mins   Duration Left Side (CPT) other (see comments)  (2 mins)   Duration Right Side (CPT) other (see comments)  (2 mins)   CPT Total Time (Min) 2   Post Treatment Assessment (CPT) breath  sounds unchanged   Signs of Intolerance (CPT) agitation;other (see comments)  (patient requested vest stopped after 2 mins)   Education   $ Education Bronchodilator;PEP Therapy;15 min;Oxygen

## 2025-03-16 NOTE — ASSESSMENT & PLAN NOTE
Patient has a diagnosis of pneumonia. The cause of the pneumonia is suspected to be bacterial in etiology but organism is not known. The pneumonia is stable. The patient has the following signs/symptoms of pneumonia: persistent hypoxia  and cough. The patient does have a current oxygen requirement and the patient does have a home oxygen requirement. I have reviewed the pertinent imaging. The following cultures have been collected: Blood cultures and Sputum culture The culture results are listed below.     Current antimicrobial regimen consists of the antibiotics listed below. Will monitor patient closely and continue current treatment plan unchanged.    Antibiotics (From admission, onward)      Start     Stop Route Frequency Ordered    03/16/25 0945  metronidazole IVPB 500 mg         -- IV Every 8 hours (non-standard times) 03/16/25 0842    03/14/25 0915  ceFEPIme injection 2 g         -- IV Every 8 hours (non-standard times) 03/14/25 0803            Microbiology Results (last 7 days)       Procedure Component Value Units Date/Time    Blood culture x two cultures. Draw prior to antibiotics [[id number]] Collected: 03/14/25 0653    Order Status: Completed Specimen: Blood Updated: 03/16/25 0832     Blood Culture, Routine No Growth to date      No Growth to date      No Growth to date    Narrative:      Aerobic and anaerobic  Collection has been rescheduled by MYB at 03/14/2025 00:30 Reason:   Unable to collect 2nd set. Nurse Notified  Collection has been rescheduled by MYB at 03/14/2025 00:30 Reason:   Unable to collect 2nd set. Nurse Notified    Blood culture x two cultures. Draw prior to antibiotics [[id number]] Collected: 03/13/25 2344    Order Status: Completed Specimen: Blood from Peripheral, Hand, Right Updated: 03/16/25 0232     Blood Culture, Routine No Growth to date      No Growth to date      No Growth to date    Narrative:      Aerobic and anaerobic    MRSA Screen by PCR [[id number]] Collected: 03/14/25  1343    Order Status: Completed Specimen: Nasal Swab Updated: 03/14/25 1651     MRSA SCREEN BY PCR Not Detected    Culture, Respiratory with Gram Stain [[id number]]     Order Status: No result Specimen: Respiratory     Respiratory Infection Panel (PCR), Nasopharyngeal [[id number]] Collected: 03/14/25 0902    Order Status: Completed Specimen: Nasopharyngeal Swab Updated: 03/14/25 1103     Respiratory Infection Panel Source NP swab     Adenovirus Not Detected     Coronavirus 229E, Common Cold Virus Not Detected     Coronavirus HKU1, Common Cold Virus Not Detected     Coronavirus NL63, Common Cold Virus Not Detected     Coronavirus OC43, Common Cold Virus Not Detected     Comment: Coronavirus strains 229E, HKU1, NL63, and OC43 can cause the common   cold   and are not associated with the respiratory disease outbreak caused   by  the COVID-19 (SARS-CoV-2 novel Coronavirus) strain.           SARS-CoV2 (COVID-19) Qualitative PCR Not Detected     Human Metapneumovirus Not Detected     Human Rhinovirus/Enterovirus Not Detected     Influenza A Not Detected     Influenza B Not Detected     Parainfluenza Virus 1 Not Detected     Parainfluenza Virus 2 Not Detected     Parainfluenza Virus 3 Not Detected     Parainfluenza Virus 4 Not Detected     Respiratory Syncytial Virus Not Detected     Bordetella Parapertussis ([id number]) Not Detected     Bordetella pertussis (ptxP) Not Detected     Chlamydia pneumoniae Not Detected     Mycoplasma pneumoniae Not Detected     Comment: Respiratory Infection Panel testing performed by Multiplex PCR.       Narrative:      Respiratory Infection Panel source->NP Swab            Empiric cefepime azithromycin

## 2025-03-16 NOTE — PROGRESS NOTES
[facility] Medicine  Progress Note    Patient Name: Reji Romano  MRN: [id number]  Patient Class: IP- Inpatient   Admission Date: 3/13/2025  Length of Stay: 2 days  Attending Physician: Rickey Gates DO  Primary Care Provider: Brenda, Provider        Subjective     Principal Problem:Acute on chronic respiratory failure with hypoxia        HPI:  This is a 70-year-old  male with a history of severe COPD, chronic hypoxic respiratory failure on 2 L nasal cannula 24/7, severe cachexia with a BMI 18.3,  throat cancer status post chemoradiation with chronic dysphagia on PEG tube feeding presenting here for worsened shortness of breaths for past few days.  Also reported may have altered mental status, however patient is awake alert when I evaluated him.  Patient has severe dysarthria secondary to previous throat cancer, history is obtained from wife at bedside.  Patient apparently has been feel unwell for few days, no sick contacts.  No chest pain.  No fever or chills.  Patient has PEG tube with every feeding/ medication through this.  Patient has is absolutely nothing by mouth.  Patient also has a chest vest on 3% normal saline nebulizer at home.   In the ER, patient is afebrile, hemodynamically stable.  WBC count is 40 K. patient did not received any steroids prior to this as per wife.     Overview/Hospital Course:  70-year-old male with a history of throat cancer status post chemoradiation, chronic dysphagia/ dysarthria PEG tube feeding, COPD on 2 L home ,chronic Pseudomonas colonization came in with worsening SOB and admitted for acute on chronic hypoxic respiratory failure likely multifactorial including COPD exacerbation wth possible pneumonia (elevated processioning).  CT abdomen showed possible colitis and patient was started on antibiotics.  CTA of the chest was negative for PE.  Slowly wean off steroids as needed for COPD exacerbation.    Interval History:  Patient said he  feels slightly better    Review of Systems   All other systems reviewed and are negative.    Objective:     Vital Signs (Most Recent):  Temp: 98.1 °F (36.7 °C) (03/16/25 0704)  Pulse: 105 (03/16/25 1121)  Resp: (!) 24 (03/16/25 1121)  BP: (!) 145/68 (03/16/25 1100)  SpO2: 97 % (03/16/25 1121) Vital Signs (24h Range):  Temp:  [97.4 °F (36.3 °C)-98.2 °F (36.8 °C)] 98.1 °F (36.7 °C)  Pulse:  [] 105  Resp:  [15-31] 24  SpO2:  [80 %-100 %] 97 %  BP: (125-149)/(68-92) 145/68     Weight: 47.9 kg (105 lb 9.6 oz)  Body mass index is 17.57 kg/m².    Intake/Output Summary (Last 24 hours) at 3/16/2025 1430  Last data filed at 3/16/2025 1412  Gross per 24 hour   Intake 3364.75 ml   Output 450 ml   Net 2914.75 ml         Physical Exam  Constitutional:       Appearance: He is ill-appearing.   Cardiovascular:      Rate and Rhythm: Normal rate.      Pulses: Normal pulses.   Pulmonary:      Breath sounds: Wheezing and rales present.   Abdominal:      General: There is no distension.      Comments: PEG tube in place   Neurological:      Mental Status: He is oriented to person, place, and time.               Significant Labs: All pertinent labs within the past 24 hours have been reviewed.    Significant Imaging: I have reviewed all pertinent imaging results/findings within the past 24 hours.      Assessment & Plan  Laryngeal cancer s/p surgery and chemo    Aware, patient has chronic PEG tube feeding.  Nothing by mouth strictly.   Consult dietitian for tube feeding.   Patient has chronic dysphagia/ dysarthria.   COPD with acute exacerbation  Patient's COPD is with exacerbation noted by continued dyspnea and use of accessory muscles for breathing currently.  Patient is currently on COPD Pathway. Continue scheduled inhalers Steroids, Antibiotics, and Supplemental oxygen and monitor respiratory status closely.       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   Improved, cut down the dose of steroids.   PEG tube      Aware, consult dietitian  Bronchiectasis  Continue home vest therapy.       Sepsis  This patient does have evidence of infective focus  My overall impression is sepsis.  Source: Respiratory  Antibiotics given-   Antibiotics (72h ago, onward)      Start     Stop Route Frequency Ordered    03/16/25 0945  metronidazole IVPB 500 mg         -- IV Every 8 hours (non-standard times) 03/16/25 0842    03/14/25 0915  ceFEPIme injection 2 g         -- IV Every 8 hours (non-standard times) 03/14/25 0803          Latest lactate reviewed-  Recent Labs   Lab 03/13/25  2342 03/14/25  0327   LACTATE  --  1.5   POCLAC 2.31*  --      Organ dysfunction indicated by Acute respiratory failure    Fluid challenge we will give fluids    Post- resuscitation assessment Yes - I attest a sepsis perfusion exam was performed within 6 hours of sepsis, severe sepsis, or septic shock presentation, following fluid resuscitation.      Will Not start Pressors- Levophed for MAP of 65  Source control achieved by:     MRSA negative.   Discontinue vancomycin.   CTA of the chest reviewed, no significant pneumonia.  UA negative.   Get CTA abdomen and pelvis to rule out intra-abdominal infectious source.   Continue cefepime and azithromycin.   Continue low rate IV hydration  Possible Community acquired pneumonia  Patient has a diagnosis of pneumonia. The cause of the pneumonia is suspected to be bacterial in etiology but organism is not known. The pneumonia is stable. The patient has the following signs/symptoms of pneumonia: persistent hypoxia  and cough. The patient does have a current oxygen requirement and the patient does have a home oxygen requirement. I have reviewed the pertinent imaging. The following cultures have been collected: Blood cultures and Sputum culture The culture results are listed below.     Current antimicrobial regimen consists of the antibiotics listed below. Will monitor patient closely and continue current treatment plan  unchanged.    Antibiotics (From admission, onward)      Start     Stop Route Frequency Ordered    03/16/25 0945  metronidazole IVPB 500 mg         -- IV Every 8 hours (non-standard times) 03/16/25 0842    03/14/25 0915  ceFEPIme injection 2 g         -- IV Every 8 hours (non-standard times) 03/14/25 0803            Microbiology Results (last 7 days)       Procedure Component Value Units Date/Time    Blood culture x two cultures. Draw prior to antibiotics [[id number]] Collected: 03/14/25 0653    Order Status: Completed Specimen: Blood Updated: 03/16/25 0832     Blood Culture, Routine No Growth to date      No Growth to date      No Growth to date    Narrative:      Aerobic and anaerobic  Collection has been rescheduled by MYB at 03/14/2025 00:30 Reason:   Unable to collect 2nd set. Nurse Notified  Collection has been rescheduled by MYB at 03/14/2025 00:30 Reason:   Unable to collect 2nd set. Nurse Notified    Blood culture x two cultures. Draw prior to antibiotics [[id number]] Collected: 03/13/25 2344    Order Status: Completed Specimen: Blood from Peripheral, Hand, Right Updated: 03/16/25 0232     Blood Culture, Routine No Growth to date      No Growth to date      No Growth to date    Narrative:      Aerobic and anaerobic    MRSA Screen by PCR [[id number]] Collected: 03/14/25 1343    Order Status: Completed Specimen: Nasal Swab Updated: 03/14/25 1651     MRSA SCREEN BY PCR Not Detected    Culture, Respiratory with Gram Stain [[id number]]     Order Status: No result Specimen: Respiratory     Respiratory Infection Panel (PCR), Nasopharyngeal [[id number]] Collected: 03/14/25 0902    Order Status: Completed Specimen: Nasopharyngeal Swab Updated: 03/14/25 1103     Respiratory Infection Panel Source NP swab     Adenovirus Not Detected     Coronavirus 229E, Common Cold Virus Not Detected     Coronavirus HKU1, Common Cold Virus Not Detected     Coronavirus NL63, Common Cold Virus Not Detected     Coronavirus OC43, Common  Cold Virus Not Detected     Comment: Coronavirus strains 229E, HKU1, NL63, and OC43 can cause the common   cold   and are not associated with the respiratory disease outbreak caused   by  the COVID-19 (SARS-CoV-2 novel Coronavirus) strain.           SARS-CoV2 (COVID-19) Qualitative PCR Not Detected     Human Metapneumovirus Not Detected     Human Rhinovirus/Enterovirus Not Detected     Influenza A Not Detected     Influenza B Not Detected     Parainfluenza Virus 1 Not Detected     Parainfluenza Virus 2 Not Detected     Parainfluenza Virus 3 Not Detected     Parainfluenza Virus 4 Not Detected     Respiratory Syncytial Virus Not Detected     Bordetella Parapertussis ([id number]) Not Detected     Bordetella pertussis (ptxP) Not Detected     Chlamydia pneumoniae Not Detected     Mycoplasma pneumoniae Not Detected     Comment: Respiratory Infection Panel testing performed by Multiplex PCR.       Narrative:      Respiratory Infection Panel source->NP Swab            Empiric cefepime azithromycin  Severe malnutrition  Nutrition consulted. Most recent weight and BMI monitored-     Measurements:  Wt Readings from Last 1 Encounters:   03/14/25 47.9 kg (105 lb 9.6 oz)   Body mass index is 17.57 kg/m².    Patient has been screened and assessed by RD.    Malnutrition Type:  Context:    Level:      Malnutrition Characteristic Summary:       Interventions/Recommendations (treatment strategy):  1.) Will begin Glucerna 1.5-5 cartons daily to provide 1780 kcals, 100gm protein and 900mL of free water. Suggest FWF 100mL after each feed. TF to meet 100% EEN and EPN. 2.) If kitchen out of Glucerna, suggest Diabetisource-6 cans daily to provide 1800 kcals, 90gm protein and 1224mL of free water. Suggest FWF 80mL after each feed. TF to meet 100% EEN and EPN. 3.) RD to perform NFPE at follow up.    Acute on chronic respiratory failure with hypoxia  Patient with Hypoxic Respiratory failure which is Acute on chronic.  he is on home oxygen at 2  LPM. Supplemental oxygen was provided and noted- Oxygen Concentration (%):  [36] 36  Signs/symptoms of respiratory failure include- increased work of breathing, respiratory distress, and use of accessory muscles. Contributing diagnoses includes - COPD and Pneumonia Labs and images were reviewed. Will treat underlying causes and adjust management of respiratory failure as follows  Antibiotics and steroids    VTE Risk Mitigation (From admission, onward)           Ordered     enoxaparin injection 40 mg  Daily         03/14/25 0803     IP VTE HIGH RISK PATIENT  Once         03/14/25 0803     Place sequential compression device  Until discontinued         03/14/25 0803                    Discharge Planning   JENNA: 3/18/2025     Code Status: Full Code   Medical Readiness for Discharge Date:   Discharge Plan A: Home                        Rickey Gates DO  Department of Hospital Medicine   [facility]

## 2025-03-16 NOTE — PLAN OF CARE
Problem: COPD (Chronic Obstructive Pulmonary Disease)  Goal: Optimal Chronic Illness Coping  Outcome: Progressing  Goal: Optimal Level of Functional [place]  Outcome: Progressing  Goal: Absence of Infection Signs and Symptoms  Outcome: Progressing  Goal: Improved Oral Intake  Outcome: Progressing  Goal: Effective Oxygenation and Ventilation  Outcome: Progressing     Problem: Adult Inpatient Plan of Care  Goal: Plan of Care Review  Outcome: Progressing  Goal: Patient-Specific Goal (Individualized)  Outcome: Progressing  Goal: Absence of Hospital-Acquired Illness or Injury  Outcome: Progressing  Goal: Optimal Comfort and Wellbeing  Outcome: Progressing  Goal: Readiness for Transition of Care  Outcome: Progressing     Problem: Sepsis/Septic Shock  Goal: Optimal Coping  Outcome: Progressing  Goal: Absence of Bleeding  Outcome: Progressing  Goal: Blood Glucose Level Within Targeted Range  Outcome: Progressing  Goal: Absence of Infection Signs and Symptoms  Outcome: Progressing  Goal: Optimal Nutrition Intake  Outcome: Progressing     Problem: Pneumonia  Goal: Fluid Balance  Outcome: Progressing  Goal: Resolution of Infection Signs and Symptoms  Outcome: Progressing  Goal: Effective Oxygenation and Ventilation  Outcome: Progressing     Problem: Enteral Nutrition  Goal: Absence of Aspiration Signs and Symptoms  Outcome: Progressing  Goal: Safe, Effective Therapy Delivery  Outcome: Progressing  Goal: Feeding Tolerance  Outcome: Progressing     Problem: Fall Injury Risk  Goal: Absence of Fall and Fall-Related Injury  Outcome: Progressing     Problem: Skin Injury Risk Increased  Goal: Skin Health and Integrity  Outcome: Progressing

## 2025-03-16 NOTE — SUBJECTIVE & OBJECTIVE
Interval History:  Patient said he feels slightly better    Review of Systems   All other systems reviewed and are negative.    Objective:     Vital Signs (Most Recent):  Temp: 98.1 °F (36.7 °C) (03/16/25 0704)  Pulse: 105 (03/16/25 1121)  Resp: (!) 24 (03/16/25 1121)  BP: (!) 145/68 (03/16/25 1100)  SpO2: 97 % (03/16/25 1121) Vital Signs (24h Range):  Temp:  [97.4 °F (36.3 °C)-98.2 °F (36.8 °C)] 98.1 °F (36.7 °C)  Pulse:  [] 105  Resp:  [15-31] 24  SpO2:  [80 %-100 %] 97 %  BP: (125-149)/(68-92) 145/68     Weight: 47.9 kg (105 lb 9.6 oz)  Body mass index is 17.57 kg/m².    Intake/Output Summary (Last 24 hours) at 3/16/2025 1430  Last data filed at 3/16/2025 1412  Gross per 24 hour   Intake 3364.75 ml   Output 450 ml   Net 2914.75 ml         Physical Exam  Constitutional:       Appearance: He is ill-appearing.   Cardiovascular:      Rate and Rhythm: Normal rate.      Pulses: Normal pulses.   Pulmonary:      Breath sounds: Wheezing and rales present.   Abdominal:      General: There is no distension.      Comments: PEG tube in place   Neurological:      Mental Status: He is oriented to person, place, and time.               Significant Labs: All pertinent labs within the past 24 hours have been reviewed.    Significant Imaging: I have reviewed all pertinent imaging results/findings within the past 24 hours.   [General Appearance - Well Developed] : well developed [General Appearance - Well Nourished] : well nourished [Normal Appearance] : normal appearance [Abdomen Soft] : soft [General Appearance - In No Acute Distress] : no acute distress [Well Groomed] : well groomed [Abdomen Tenderness] : non-tender [Costovertebral Angle Tenderness] : no ~M costovertebral angle tenderness [FreeTextEntry1] : Atophic changes, but appears well  [Urinary Bladder Findings] : the bladder was normal on palpation [Edema] : no peripheral edema [Respiration, Rhythm And Depth] : normal respiratory rhythm and effort [] : no respiratory distress [Exaggerated Use Of Accessory Muscles For Inspiration] : no accessory muscle use [Oriented To Time, Place, And Person] : oriented to person, place, and time [Mood] : the mood was normal [Affect] : the affect was normal [Not Anxious] : not anxious [Normal Station and Gait] : the gait and station were normal for the patient's age [No Focal Deficits] : no focal deficits [No Palpable Adenopathy] : no palpable adenopathy

## 2025-03-16 NOTE — ASSESSMENT & PLAN NOTE
Patient with Hypoxic Respiratory failure which is Acute on chronic.  he is on home oxygen at 2 LPM. Supplemental oxygen was provided and noted- Oxygen Concentration (%):  [36] 36  Signs/symptoms of respiratory failure include- increased work of breathing, respiratory distress, and use of accessory muscles. Contributing diagnoses includes - COPD and Pneumonia Labs and images were reviewed. Will treat underlying causes and adjust management of respiratory failure as follows  Antibiotics and steroids

## 2025-03-17 LAB
ALBUMIN SERPL BCP-MCNC: 2.5 G/DL (ref 3.5–5.2)
ALLENS TEST: ABNORMAL
ALP SERPL-CCNC: 73 U/L (ref 55–135)
ALT SERPL W/O P-5'-P-CCNC: 20 U/L (ref 10–44)
ANION GAP SERPL CALC-SCNC: 8 MMOL/L (ref 8–16)
AST SERPL-CCNC: 13 U/L (ref 10–40)
BASOPHILS # BLD AUTO: 0.05 K/UL (ref 0–0.2)
BASOPHILS NFR BLD: 0.2 % (ref 0–1.9)
BILIRUB SERPL-MCNC: 0.3 MG/DL (ref 0.1–1)
BUN SERPL-MCNC: 26 MG/DL (ref 8–23)
CALCIUM SERPL-MCNC: 8.4 MG/DL (ref 8.7–10.5)
CHLORIDE SERPL-SCNC: 108 MMOL/L (ref 95–110)
CO2 SERPL-SCNC: 28 MMOL/L (ref 23–29)
CREAT SERPL-MCNC: 0.5 MG/DL (ref 0.5–1.4)
DELSYS: ABNORMAL
DIFFERENTIAL METHOD BLD: ABNORMAL
EOSINOPHIL # BLD AUTO: 0 K/UL (ref 0–0.5)
EOSINOPHIL NFR BLD: 0 % (ref 0–8)
ERYTHROCYTE [DISTWIDTH] IN BLOOD BY AUTOMATED COUNT: 14.2 % (ref 11.5–14.5)
EST. GFR  (NO RACE VARIABLE): >60 ML/MIN/1.73 M^2
FLOW: 3.5
GLUCOSE SERPL-MCNC: 124 MG/DL (ref 70–110)
GLUCOSE SERPL-MCNC: 166 MG/DL (ref 70–110)
HCO3 UR-SCNC: 30.8 MMOL/L (ref 24–28)
HCT VFR BLD AUTO: 40.1 % (ref 40–54)
HCT VFR BLD CALC: 46 %PCV (ref 36–54)
HGB BLD-MCNC: 12.3 G/DL (ref 14–18)
IMM GRANULOCYTES # BLD AUTO: 0.2 K/UL (ref 0–0.04)
IMM GRANULOCYTES NFR BLD AUTO: 0.8 % (ref 0–0.5)
LYMPHOCYTES # BLD AUTO: 0.5 K/UL (ref 1–4.8)
LYMPHOCYTES NFR BLD: 1.8 % (ref 18–48)
MAGNESIUM SERPL-MCNC: 2.2 MG/DL (ref 1.6–2.6)
MCH RBC QN AUTO: 28.5 PG (ref 27–31)
MCHC RBC AUTO-ENTMCNC: 30.7 G/DL (ref 32–36)
MCV RBC AUTO: 93 FL (ref 82–98)
MODE: ABNORMAL
MONOCYTES # BLD AUTO: 1.2 K/UL (ref 0.3–1)
MONOCYTES NFR BLD: 4.8 % (ref 4–15)
NEUTROPHILS # BLD AUTO: 23.8 K/UL (ref 1.8–7.7)
NEUTROPHILS NFR BLD: 92.4 % (ref 38–73)
NRBC BLD-RTO: 0 /100 WBC
PCO2 BLDA: 55.1 MMHG (ref 35–45)
PH SMN: 7.36 [PH] (ref 7.35–7.45)
PHOSPHATE SERPL-MCNC: 3.3 MG/DL (ref 2.7–4.5)
PLATELET # BLD AUTO: 315 K/UL (ref 150–450)
PMV BLD AUTO: 10.8 FL (ref 9.2–12.9)
PO2 BLDA: 89 MMHG (ref 80–100)
POC BE: 5 MMOL/L
POC IONIZED CALCIUM: 1.27 MMOL/L (ref 1.06–1.42)
POC SATURATED O2: 96 % (ref 95–100)
POC TCO2: 32 MMOL/L (ref 23–27)
POTASSIUM BLD-SCNC: 4.1 MMOL/L (ref 3.5–5.1)
POTASSIUM SERPL-SCNC: 4.1 MMOL/L (ref 3.5–5.1)
PROCALCITONIN SERPL IA-MCNC: 0.52 NG/ML (ref 0–0.5)
PROT SERPL-MCNC: 4.6 G/DL (ref 6–8.4)
RBC # BLD AUTO: 4.31 M/UL (ref 4.6–6.2)
SAMPLE: ABNORMAL
SITE: ABNORMAL
SODIUM BLD-SCNC: 144 MMOL/L (ref 136–145)
SODIUM SERPL-SCNC: 144 MMOL/L (ref 136–145)
WBC # BLD AUTO: 25.73 K/UL (ref 3.9–12.7)

## 2025-03-17 PROCEDURE — 25000003 PHARM REV CODE 250: Performed by: HOSPITALIST

## 2025-03-17 PROCEDURE — 85025 COMPLETE CBC W/AUTO DIFF WBC: CPT | Performed by: HOSPITALIST

## 2025-03-17 PROCEDURE — 63600175 PHARM REV CODE 636 W HCPCS: Performed by: HOSPITALIST

## 2025-03-17 PROCEDURE — 94640 AIRWAY INHALATION TREATMENT: CPT

## 2025-03-17 PROCEDURE — 99900031 HC PATIENT EDUCATION (STAT)

## 2025-03-17 PROCEDURE — 25000242 PHARM REV CODE 250 ALT 637 W/ HCPCS: Performed by: HOSPITALIST

## 2025-03-17 PROCEDURE — 97165 OT EVAL LOW COMPLEX 30 MIN: CPT

## 2025-03-17 PROCEDURE — 99900035 HC TECH TIME PER 15 MIN (STAT)

## 2025-03-17 PROCEDURE — 94667 MNPJ CHEST WALL 1ST: CPT

## 2025-03-17 PROCEDURE — 27100171 HC OXYGEN HIGH FLOW UP TO 24 HOURS

## 2025-03-17 PROCEDURE — 84145 PROCALCITONIN (PCT): CPT | Performed by: INTERNAL MEDICINE

## 2025-03-17 PROCEDURE — 87186 SC STD MICRODIL/AGAR DIL: CPT | Performed by: HOSPITALIST

## 2025-03-17 PROCEDURE — 84295 ASSAY OF SERUM SODIUM: CPT

## 2025-03-17 PROCEDURE — 82330 ASSAY OF CALCIUM: CPT

## 2025-03-17 PROCEDURE — 85014 HEMATOCRIT: CPT

## 2025-03-17 PROCEDURE — 63600175 PHARM REV CODE 636 W HCPCS: Mod: JZ,TB | Performed by: FAMILY MEDICINE

## 2025-03-17 PROCEDURE — 84100 ASSAY OF PHOSPHORUS: CPT | Performed by: HOSPITALIST

## 2025-03-17 PROCEDURE — 99223 1ST HOSP IP/OBS HIGH 75: CPT | Mod: ,,, | Performed by: INTERNAL MEDICINE

## 2025-03-17 PROCEDURE — 84132 ASSAY OF SERUM POTASSIUM: CPT

## 2025-03-17 PROCEDURE — 97161 PT EVAL LOW COMPLEX 20 MIN: CPT

## 2025-03-17 PROCEDURE — 83735 ASSAY OF MAGNESIUM: CPT | Performed by: HOSPITALIST

## 2025-03-17 PROCEDURE — 21000000 HC CCU ICU ROOM CHARGE

## 2025-03-17 PROCEDURE — 80053 COMPREHEN METABOLIC PANEL: CPT | Performed by: HOSPITALIST

## 2025-03-17 PROCEDURE — 97530 THERAPEUTIC ACTIVITIES: CPT

## 2025-03-17 PROCEDURE — 94761 N-INVAS EAR/PLS OXIMETRY MLT: CPT

## 2025-03-17 PROCEDURE — 27000221 HC OXYGEN, UP TO 24 HOURS

## 2025-03-17 PROCEDURE — 36415 COLL VENOUS BLD VENIPUNCTURE: CPT | Performed by: INTERNAL MEDICINE

## 2025-03-17 PROCEDURE — 36600 WITHDRAWAL OF ARTERIAL BLOOD: CPT

## 2025-03-17 PROCEDURE — 63600175 PHARM REV CODE 636 W HCPCS: Performed by: INTERNAL MEDICINE

## 2025-03-17 PROCEDURE — 5A09357 ASSISTANCE WITH RESPIRATORY VENTILATION, LESS THAN 24 CONSECUTIVE HOURS, CONTINUOUS POSITIVE AIRWAY PRESSURE: ICD-10-PCS | Performed by: HOSPITALIST

## 2025-03-17 PROCEDURE — 87205 SMEAR GRAM STAIN: CPT | Performed by: HOSPITALIST

## 2025-03-17 PROCEDURE — 82803 BLOOD GASES ANY COMBINATION: CPT

## 2025-03-17 PROCEDURE — 36415 COLL VENOUS BLD VENIPUNCTURE: CPT | Performed by: HOSPITALIST

## 2025-03-17 PROCEDURE — 87070 CULTURE OTHR SPECIMN AEROBIC: CPT | Performed by: HOSPITALIST

## 2025-03-17 PROCEDURE — 97535 SELF CARE MNGMENT TRAINING: CPT

## 2025-03-17 PROCEDURE — 94660 CPAP INITIATION&MGMT: CPT

## 2025-03-17 RX ORDER — PREDNISONE 5 MG/ML
SOLUTION ORAL
Qty: 240 ML | Refills: 0 | Status: SHIPPED | OUTPATIENT
Start: 2025-03-17

## 2025-03-17 RX ORDER — FUROSEMIDE 10 MG/ML
INJECTION INTRAMUSCULAR; INTRAVENOUS
Status: DISPENSED
Start: 2025-03-17 | End: 2025-03-18

## 2025-03-17 RX ORDER — FUROSEMIDE 10 MG/ML
40 INJECTION INTRAMUSCULAR; INTRAVENOUS ONCE
Status: COMPLETED | OUTPATIENT
Start: 2025-03-17 | End: 2025-03-17

## 2025-03-17 RX ORDER — DOXYCYCLINE HYCLATE 100 MG
100 TABLET ORAL 2 TIMES DAILY
Qty: 14 TABLET | Refills: 0 | Status: SHIPPED | OUTPATIENT
Start: 2025-03-17 | End: 2025-03-20 | Stop reason: HOSPADM

## 2025-03-17 RX ORDER — METRONIDAZOLE 500 MG/1
500 TABLET ORAL 3 TIMES DAILY
Qty: 21 TABLET | Refills: 0 | Status: SHIPPED | OUTPATIENT
Start: 2025-03-17 | End: 2025-03-20 | Stop reason: HOSPADM

## 2025-03-17 RX ADMIN — ARFORMOTEROL TARTRATE 15 MCG: 15 SOLUTION RESPIRATORY (INHALATION) at 07:03

## 2025-03-17 RX ADMIN — METHYLPREDNISOLONE SODIUM SUCCINATE 40 MG: 40 INJECTION, POWDER, FOR SOLUTION INTRAMUSCULAR; INTRAVENOUS at 09:03

## 2025-03-17 RX ADMIN — METRONIDAZOLE 500 MG: 500 INJECTION, SOLUTION INTRAVENOUS at 08:03

## 2025-03-17 RX ADMIN — IPRATROPIUM BROMIDE AND ALBUTEROL SULFATE 3 ML: .5; 3 SOLUTION RESPIRATORY (INHALATION) at 07:03

## 2025-03-17 RX ADMIN — SODIUM CHLORIDE SOLN NEBU 3% 4 ML: 3 NEBU SOLN at 07:03

## 2025-03-17 RX ADMIN — IPRATROPIUM BROMIDE AND ALBUTEROL SULFATE 3 ML: .5; 3 SOLUTION RESPIRATORY (INHALATION) at 03:03

## 2025-03-17 RX ADMIN — OXYCODONE HYDROCHLORIDE 20 MG: 10 TABLET ORAL at 03:03

## 2025-03-17 RX ADMIN — FUROSEMIDE 40 MG: 10 INJECTION, SOLUTION INTRAMUSCULAR; INTRAVENOUS at 02:03

## 2025-03-17 RX ADMIN — IPRATROPIUM BROMIDE AND ALBUTEROL SULFATE 3 ML: .5; 3 SOLUTION RESPIRATORY (INHALATION) at 11:03

## 2025-03-17 RX ADMIN — CEFEPIME 2 G: 2 INJECTION, POWDER, FOR SOLUTION INTRAVENOUS at 03:03

## 2025-03-17 RX ADMIN — ENOXAPARIN SODIUM 40 MG: 40 INJECTION SUBCUTANEOUS at 04:03

## 2025-03-17 RX ADMIN — LEVOTHYROXINE SODIUM 125 MCG: 0.1 TABLET ORAL at 08:03

## 2025-03-17 RX ADMIN — CEFEPIME 2 G: 2 INJECTION, POWDER, FOR SOLUTION INTRAVENOUS at 09:03

## 2025-03-17 RX ADMIN — ROFLUMILAST 500 MCG: 500 TABLET ORAL at 09:03

## 2025-03-17 RX ADMIN — BUDESONIDE INHALATION 0.5 MG: 0.5 SUSPENSION RESPIRATORY (INHALATION) at 07:03

## 2025-03-17 RX ADMIN — BUDESONIDE INHALATION 1 MG: 0.5 SUSPENSION RESPIRATORY (INHALATION) at 07:03

## 2025-03-17 RX ADMIN — CEFEPIME 2 G: 2 INJECTION, POWDER, FOR SOLUTION INTRAVENOUS at 05:03

## 2025-03-17 RX ADMIN — METHYLPREDNISOLONE SODIUM SUCCINATE 40 MG: 40 INJECTION, POWDER, FOR SOLUTION INTRAMUSCULAR; INTRAVENOUS at 03:03

## 2025-03-17 RX ADMIN — METHYLPREDNISOLONE SODIUM SUCCINATE 40 MG: 40 INJECTION, POWDER, FOR SOLUTION INTRAMUSCULAR; INTRAVENOUS at 05:03

## 2025-03-17 RX ADMIN — AMITRIPTYLINE HYDROCHLORIDE 50 MG: 25 TABLET, FILM COATED ORAL at 08:03

## 2025-03-17 RX ADMIN — METRONIDAZOLE 500 MG: 500 INJECTION, SOLUTION INTRAVENOUS at 05:03

## 2025-03-17 RX ADMIN — OXYCODONE HYDROCHLORIDE 20 MG: 10 TABLET ORAL at 05:03

## 2025-03-17 RX ADMIN — METRONIDAZOLE 500 MG: 500 INJECTION, SOLUTION INTRAVENOUS at 03:03

## 2025-03-17 NOTE — CARE UPDATE
03/17/25 0728   Patient Assessment/Suction   Level of Consciousness (AVPU) alert   Respiratory Effort Unlabored;Normal   Expansion/Accessory Muscles/Retractions no use of accessory muscles;no retractions;expansion symmetric   All Lung Fields Breath Sounds coarse   Rhythm/Pattern, Respiratory unlabored;pattern regular;depth regular   Cough Frequency infrequent   Cough Type weak   PRE-TX-O2   Device (Oxygen Therapy) nasal cannula with humidification   Flow (L/min) (Oxygen Therapy) 2   SpO2 99 %   Pulse 105   Resp (!) 21   Aerosol Therapy   $ Aerosol Therapy Charges Aerosol Treatment   Daily Review of Necessity (SVN) completed   Respiratory Treatment Status (SVN) given   Treatment Route (SVN) oxygen;mask   Patient Position HOB elevated   Post Treatment Assessment (SVN) breath sounds unchanged   Signs of Intolerance (SVN) none   Chest Physiotherapy   $ Chest Physiotherapy Charges Initial   Daily Review of Necessity (CPT) completed   Type (CPT) percussion   Method (CPT) mechanical percussor   Patient Position (CPT) HOB elevated   Lung Fields (CPT) Anterior:;Posterior:   Duration per Field (CPT) 2 mins   Duration Left Side (CPT) other (see comments)   Duration Right Side (CPT) other (see comments)  (2 minutes)   CPT Total Time (Min)   (2 minutes)   Post Treatment Assessment (CPT) breath sounds unchanged   Signs of Intolerance (CPT) none

## 2025-03-17 NOTE — PLAN OF CARE
03/16/25 2002   Patient Assessment/Suction   Level of Consciousness (AVPU) alert   Respiratory Effort Normal;Unlabored   Expansion/Accessory Muscles/Retractions no use of accessory muscles   All Lung Fields Breath Sounds coarse   Rhythm/Pattern, Respiratory pattern regular;unlabored   PRE-TX-O2   Device (Oxygen Therapy) nasal cannula with humidification   $ Is the patient on Low Flow Oxygen? Yes   Flow (L/min) (Oxygen Therapy) 3   SpO2 99 %   Pulse Oximetry Type Continuous   $ Pulse Oximetry - Multiple Charge Pulse Oximetry - Multiple   Pulse 105   Resp 20   BP (!) 153/93   Aerosol Therapy   $ Aerosol Therapy Charges Aerosol Treatment   Daily Review of Necessity (SVN) completed   Respiratory Treatment Status (SVN) given   Treatment Route (SVN) mask;oxygen   Patient Position HOB elevated   Post Treatment Assessment (SVN) breath sounds improved   Signs of Intolerance (SVN) none   Vibratory PEP Therapy   $ Vibratory PEP Charges Aerobika Therapy   $ Vibratory PEP Tech Time Charges 15 min   Daily Review of Necessity (PEP Therapy) completed   Type (PEP Therapy) vibratory/oscillatory   Device (PEP Therapy) flutter   Route (PEP Therapy) mouthpiece   Breaths per Cycle (PEP Therapy) 10   Cycles (PEP Therapy) 1   Settings (PEP Therapy) PEP 5   Patient Position (PEP Therapy) HOB elevated   Post Treatment Assessment (PEP) breath sounds unchanged   Signs of Intolerance (PEP Therapy) none   Chest Physiotherapy   $ Chest Physiotherapy Charges Initial   Daily Review of Necessity (CPT) completed   Type (CPT) percussion   Method (CPT) mechanical percussor   Patient Position (CPT) HOB elevated   Lung Fields (CPT) Anterior:;Posterior:;Lateral:   Duration per Field (CPT) 2 mins   CPT Total Time (Min) 10   Post Treatment Assessment (CPT) breath sounds unchanged   Signs of Intolerance (CPT) none   High Frequency Chest Compression Vest   $ Chest Compression Charges Refused   Vest Type (HFCCV) chest wrap vest   Respiratory Interventions    Cough And Deep Breathing done with encouragement   Airway/Ventilation Management humidification applied;pulmonary hygiene promoted   Breathing Techniques/Airway Clearance deep/controlled cough encouraged;diaphragmatic breathing promoted   Education   $ Education Bronchodilator;PEP Therapy;Oxygen;15 min

## 2025-03-17 NOTE — PLAN OF CARE
Problem: COPD (Chronic Obstructive Pulmonary Disease)  Goal: Optimal Chronic Illness Coping  Outcome: Met  Goal: Optimal Level of Functional [place]  Outcome: Met  Goal: Absence of Infection Signs and Symptoms  Outcome: Met  Goal: Improved Oral Intake  Outcome: Met  Goal: Effective Oxygenation and Ventilation  Outcome: Met

## 2025-03-17 NOTE — ASSESSMENT & PLAN NOTE
Nutrition consulted. Most recent weight and BMI monitored-     Measurements:  Wt Readings from Last 1 Encounters:   03/17/25 48.5 kg (106 lb 14.8 oz)   Body mass index is 17.79 kg/m².    Patient has been screened and assessed by RD.    Malnutrition Type:  Context:    Level:      Malnutrition Characteristic Summary:       Interventions/Recommendations (treatment strategy):  1.) Will begin Glucerna 1.5-5 cartons daily to provide 1780 kcals, 100gm protein and 900mL of free water. Suggest FWF 100mL after each feed. TF to meet 100% EEN and EPN. 2.) If kitchen out of Glucerna, suggest Diabetisource-6 cans daily to provide 1800 kcals, 90gm protein and 1224mL of free water. Suggest FWF 80mL after each feed. TF to meet 100% EEN and EPN. 3.) RD to perform NFPE at follow up.

## 2025-03-17 NOTE — CONSULTS
Pulmonary/Critical Care Consult      Patient name: Reji Romano  MRN: [id number]  Date: 03/17/2025    Admit Date: 3/13/2025  Consult Requested By: Zoey Copeland MD    Reason for Consult: REspiratory failure, COPD    HPI:    3/17/2025 - Pt has been admitted fr a few days and was tentatively going home today when he developed an acute decompensation.  I was called to see him.  CXR looked OK and ABG was OK but he had increased WOB.  When I arrived he still has increased WOB biut will settle down at times.  He goes from not responding to questions to answering (though voice quality is not good).  He has COPD (I suspect very severe, wife says at one point there was talk about transplant), h/o head and neck cancer, s/p surgery, XRT (she tells me no active disease), prior trach (she says related to respiratory failure with sepsis), severe malnutrition.  I discussed at length with them about code status and at this time he wants to be a full code - he does not want to live prolonged on a ventilator (I did discuss that if he ends up on a ventilator that extubation would be very difficult).  We also discussed trying BiPAP and he is willing to try.  He also has chronic pain issues and I feel taht we need to continue his medications.      Review of Systems    Review of Systems   Constitutional:  Positive for malaise/fatigue and weight loss. Negative for chills, diaphoresis and fever.   HENT:  Negative for congestion.    Eyes:  Negative for pain.   Respiratory:  Positive for cough, sputum production (trouble clearing airway) and shortness of breath. Negative for hemoptysis, wheezing and stridor.    Cardiovascular:  Negative for chest pain, palpitations, orthopnea, claudication, leg swelling and PND.   Gastrointestinal:  Positive for nausea. Negative for abdominal pain, constipation, diarrhea, heartburn and vomiting.        PEG   Genitourinary:  Negative for dysuria, frequency and urgency.   Musculoskeletal:  Negative for falls  and myalgias.   Neurological:  Positive for weakness. Negative for sensory change and focal weakness.   Psychiatric/Behavioral:  Negative for depression. The patient is not nervous/anxious.        Past Medical History    History reviewed. No pertinent past medical history.    Past Surgical History    History reviewed. No pertinent surgical history.    Medications (scheduled):      albuterol-ipratropium  3 mL Nebulization Q4H WAKE    amitriptyline  50 mg Oral QHS    arformoteroL  15 mcg Nebulization BID    budesonide  1 mg Nebulization Q12H    ceFEPime IV (PEDS and ADULTS)  2 g Intravenous Q8H    enoxparin  40 mg Subcutaneous Daily    furosemide        levothyroxine  125 mcg Oral QHS    methylPREDNISolone injection (PEDS and ADULTS)  40 mg Intravenous Q8H    metroNIDAZOLE IV (PEDS and ADULTS)  500 mg Intravenous Q8H    roflumilast  500 mcg Oral Daily    sodium chloride 3%  4 mL Nebulization BID       Medications (infusions):         Medications (prn):       Current Facility-Administered Medications:     furosemide, , ,     hydrOXYzine HCL, 25 mg, Oral, BID PRN    morphine, 2 mg, Intravenous, Q4H PRN    ondansetron, 4 mg, Intravenous, Q12H PRN    oxyCODONE, 20 mg, Oral, TID PRN    Flushing PICC/Midline Protocol, , , Until Discontinued **AND** sodium chloride 0.9%, 10 mL, Intravenous, Q12H PRN    sodium chloride 0.9%, 3 mL, Intravenous, PRN    Family History: No family history on file.    Social History: Tobacco: Tobacco Use History[1]                             EtOH:   Social History     Substance and Sexual Activity   Alcohol Use Not Currently                                Drugs:   Social History     Substance and Sexual Activity   Drug Use Not Currently     Physical Exam    Vital signs:  Temp:  [97.5 °F (36.4 °C)-98.2 °F (36.8 °C)]   Pulse:  []   Resp:  [14-46]   BP: ()/(61-96)   SpO2:  [78 %-100 %]     Intake/Output:   Intake/Output Summary (Last 24 hours) at 3/17/2025 4941  Last data filed at  "3/17/2025 1744  Gross per 24 hour   Intake 198.04 ml   Output 600 ml   Net -401.96 ml        BMI: Estimated body mass index is 17.79 kg/m² as calculated from the following:    Height as of this encounter: 5' 5" (1.651 m).    Weight as of this encounter: 48.5 kg (106 lb 14.8 oz).    Physical Exam  Vitals and nursing note reviewed.   Constitutional:       General: He is in acute distress.      Appearance: Normal appearance. He is ill-appearing. He is not toxic-appearing or diaphoretic.      Comments: Thin, frail   HENT:      Head: Normocephalic and atraumatic.      Comments: Prior surgery (wife says he has titanium in his jaw)     Right Ear: External ear normal.      Left Ear: External ear normal.      Nose: Nose normal.      Mouth/Throat:      Mouth: Mucous membranes are moist.      Pharynx: Oropharynx is clear. No oropharyngeal exudate.   Eyes:      General: No scleral icterus.        Right eye: No discharge.         Left eye: No discharge.      Extraocular Movements: Extraocular movements intact.      Conjunctiva/sclera: Conjunctivae normal.      Pupils: Pupils are equal, round, and reactive to light.   Neck:      Vascular: No carotid bruit.      Comments: Prior trach site  Very firm neck tissue from treatment  Decreased mobility  Cardiovascular:      Rate and Rhythm: Normal rate and regular rhythm.      Pulses: Normal pulses.      Heart sounds: Normal heart sounds. No murmur heard.     No friction rub. No gallop.   Pulmonary:      Effort: Respiratory distress present.      Breath sounds: No stridor. Rhonchi present. No wheezing or rales.      Comments: Decreased BS throughout  Intermittent acc m use  Chest:      Chest wall: No tenderness.   Abdominal:      General: Bowel sounds are normal. There is no distension.      Tenderness: There is no abdominal tenderness. There is no guarding.      Comments: PEG   Musculoskeletal:         General: No swelling. Normal range of motion.      Cervical back: Normal range of " "motion and neck supple. No rigidity or tenderness.      Right lower leg: No edema.      Left lower leg: No edema.      Comments: + surgical scars  + muscle wasting   Lymphadenopathy:      Cervical: No cervical adenopathy.   Skin:     General: Skin is warm and dry.      Capillary Refill: Capillary refill takes less than 2 seconds.      Coloration: Skin is not jaundiced.      Findings: No bruising.   Neurological:      General: No focal deficit present.      Mental Status: He is alert. Mental status is at baseline.      Cranial Nerves: No cranial nerve deficit.      Sensory: No sensory deficit.      Motor: Weakness present.      Comments: Waxing and waning of LOC  Difficulty understanding when he vocalizes   Psychiatric:      Comments: Anxious          Laboratory    Recent Labs   Lab 03/17/25  0322 03/17/25  1453   WBC 25.73*  --    RBC 4.31*  --    HGB 12.3*  --    HCT 40.1 46     --    MCV 93  --    MCH 28.5  --    MCHC 30.7*  --        Recent Labs   Lab 03/17/25  0322   CALCIUM 8.4*   ALBUMIN 2.5*   PROT 4.6*      K 4.1   CO2 28      BUN 26*   CREATININE 0.5   ALKPHOS 73   ALT 20   AST 13   BILITOT 0.3       No results for input(s): "PT", "INR", "APTT" in the last 24 hours.    No results for input(s): "CPK", "CPKMB", "TROPONINI", "MB" in the last 24 hours.    Additional labs: reviewed    Microbiology:       Microbiology Results (last 7 days)       Procedure Component Value Units Date/Time    Culture, Respiratory with Gram Stain [[id number]] Collected: 03/17/25 0753    Order Status: Completed Specimen: Respiratory from Sputum, Expectorated Updated: 03/17/25 1651     Gram Stain (Respiratory) >10 epithelial cells per low power field     Gram Stain (Respiratory) Many WBC's     Gram Stain (Respiratory) Moderate Gram negative rods    Blood culture x two cultures. Draw prior to antibiotics [[id number]] Collected: 03/14/25 0653    Order Status: Completed Specimen: Blood Updated: 03/17/25 0832     Blood " Culture, Routine No Growth to date      No Growth to date      No Growth to date      No Growth to date    Narrative:      Aerobic and anaerobic  Collection has been rescheduled by MYB at 03/14/2025 00:30 Reason:   Unable to collect 2nd set. Nurse Notified  Collection has been rescheduled by MYB at 03/14/2025 00:30 Reason:   Unable to collect 2nd set. Nurse Notified    Blood culture x two cultures. Draw prior to antibiotics [[id number]] Collected: 03/13/25 2344    Order Status: Completed Specimen: Blood from Peripheral, Hand, Right Updated: 03/17/25 0232     Blood Culture, Routine No Growth to date      No Growth to date      No Growth to date      No Growth to date    Narrative:      Aerobic and anaerobic    MRSA Screen by PCR [[id number]] Collected: 03/14/25 1343    Order Status: Completed Specimen: Nasal Swab Updated: 03/14/25 1651     MRSA SCREEN BY PCR Not Detected    Respiratory Infection Panel (PCR), Nasopharyngeal [[id number]] Collected: 03/14/25 0902    Order Status: Completed Specimen: Nasopharyngeal Swab Updated: 03/14/25 1103     Respiratory Infection Panel Source NP swab     Adenovirus Not Detected     Coronavirus 229E, Common Cold Virus Not Detected     Coronavirus HKU1, Common Cold Virus Not Detected     Coronavirus NL63, Common Cold Virus Not Detected     Coronavirus OC43, Common Cold Virus Not Detected     Comment: Coronavirus strains 229E, HKU1, NL63, and OC43 can cause the common   cold   and are not associated with the respiratory disease outbreak caused   by  the COVID-19 (SARS-CoV-2 novel Coronavirus) strain.           SARS-CoV2 (COVID-19) Qualitative PCR Not Detected     Human Metapneumovirus Not Detected     Human Rhinovirus/Enterovirus Not Detected     Influenza A Not Detected     Influenza B Not Detected     Parainfluenza Virus 1 Not Detected     Parainfluenza Virus 2 Not Detected     Parainfluenza Virus 3 Not Detected     Parainfluenza Virus 4 Not Detected     Respiratory Syncytial Virus  Not Detected     Bordetella Parapertussis (AI4078) Not Detected     Bordetella pertussis (ptxP) Not Detected     Chlamydia pneumoniae Not Detected     Mycoplasma pneumoniae Not Detected     Comment: Respiratory Infection Panel testing performed by Multiplex PCR.       Narrative:      Respiratory Infection Panel source->NP Swab            Radiology    X-Ray Chest 1 View  Narrative: EXAMINATION:  XR CHEST 1 VIEW    CLINICAL HISTORY:  sob;    FINDINGS:  Portable chest with comparison chest x-ray 03/14/2025.  Normal cardiomediastinal silhouette.Lungs are clear. Pulmonary vasculature is normal. No acute osseous abnormality.  Left brachial venous line noted.  Impression: No acute cardiopulmonary abnormality.    Electronically signed by: Oscar Mccormick  Date:    03/17/2025  Time:    15:12    CXR with hyperinflation and flattened diaphragms    Additional Studies    reviewed    Ventilator Information    Oxygen Concentration (%):  [28] 28             Recent Labs     03/17/25  1453   PH 7.355   PCO2 55.1*   PO2 89   HCO3 30.8*   POCSATURATED 96   BE 5*         Impression    Active Hospital Problems    Diagnosis  POA    *Acute on chronic respiratory failure with hypoxia [J96.21]  Yes    Sepsis [A41.9]  Yes    Possible Community acquired pneumonia [J18.9]  Yes    Severe malnutrition [E43]  Yes    COPD with acute exacerbation [J44.1]  Yes    Laryngeal cancer s/p surgery and chemo [C32.9]  Yes    PEG tube  [K94.23]  Yes    Bronchiectasis [J47.9]  Yes      Resolved Hospital Problems   No resolved problems to display.       Plan    Continue respiratory treatments, steroids, antibiotics  BiPAP as tolerated  If he worsens we will move to ICU and intubate though prognosis for liberation would be very poor  Would continue his chronic pain meds to help with pain, air hunger and avoid any withdrawal  Wtach closely  Lovenox fr prophylaxis  Agree with plans for palliative care team to meet with them and help them discuss goals of care going  forward    Thank you for this consult.  I will follow with you while the patient is hospitalized.        Anton Mak MD  [facility] Pulmonary/Critical Care  03/17/2025               [1]   Social History  Tobacco Use   Smoking Status Former    Types: Cigarettes   Smokeless Tobacco Not on file

## 2025-03-17 NOTE — CARE UPDATE
03/17/25 0713   Patient Assessment/Suction   Level of Consciousness (AVPU) alert   Respiratory Effort Normal;Unlabored   Expansion/Accessory Muscles/Retractions no use of accessory muscles;no retractions;expansion symmetric   All Lung Fields Breath Sounds coarse   Rhythm/Pattern, Respiratory unlabored;pattern regular;depth regular   Cough Frequency infrequent   Cough Type weak   PRE-TX-O2   Device (Oxygen Therapy) nasal cannula with humidification   $ Is the patient on Low Flow Oxygen? Yes   Flow (L/min) (Oxygen Therapy) (S)  2  (pt wears 2L at home)   Oxygen Concentration (%) 28   SpO2 100 %   Pulse Oximetry Type Continuous   $ Pulse Oximetry - Multiple Charge Pulse Oximetry - Multiple   Pulse 103   Resp 18   Aerosol Therapy   $ Aerosol Therapy Charges Aerosol Treatment   Daily Review of Necessity (SVN) completed   Respiratory Treatment Status (SVN) given   Treatment Route (SVN) oxygen;mask   Patient Position HOB elevated   Post Treatment Assessment (SVN) breath sounds unchanged   Signs of Intolerance (SVN) none   Ready to Wean/Extubation Screen   FIO2<=50 (chart decimal) 0.28

## 2025-03-17 NOTE — DISCHARGE SUMMARY
Formerly [facility], [facility] Medicine  Discharge Summary      Patient Name: Reji Romano  MRN: [id number]  [facility]: [id number]  Patient Class: IP- Inpatient  Admission Date: 3/13/2025  Hospital Length of Stay: 3 days  Discharge Date and Time:  03/17/2025 11:25 AM  Attending Physician: Zoey Copeland MD   Discharging Provider: Zoey Copeland MD  Primary Care Provider: Zulema Gutierrez    Primary Care Team: Networked reference to record PCT     HPI:   This is a 70-year-old  male with a history of severe COPD, chronic hypoxic respiratory failure on 2 L nasal cannula 24/7, severe cachexia with a BMI 18.3,  throat cancer status post chemoradiation with chronic dysphagia on PEG tube feeding presenting here for worsened shortness of breaths for past few days.  Also reported may have altered mental status, however patient is awake alert when I evaluated him.  Patient has severe dysarthria secondary to previous throat cancer, history is obtained from wife at bedside.  Patient apparently has been feel unwell for few days, no sick contacts.  No chest pain.  No fever or chills.  Patient has PEG tube with every feeding/ medication through this.  Patient has is absolutely nothing by mouth.  Patient also has a chest vest on 3% normal saline nebulizer at home.   In the ER, patient is afebrile, hemodynamically stable.  WBC count is 40 K. patient did not received any steroids prior to this as per wife.     * No surgery found *      Hospital Course:   70-year-old male with a history of throat cancer status post chemoradiation, chronic dysphagia/ dysarthria PEG tube feeding, COPD on 2 L home ,chronic Pseudomonas colonization came in with worsening SOB and admitted for acute on chronic hypoxic respiratory failure likely multifactorial including COPD exacerbation wth possible pneumonia (elevated processioning).  CT abdomen showed possible colitis and patient was started on antibiotics.  CTA of the chest was negative for  PE.  Slowly wean down steroids as needed for COPD exacerbation.   CT scan of the abdomen was consistent with pancolitis.  Patient maintained on antibiotic therapy with much improved symptoms.  Patient is anxious to be discharged.  Discharge plan of care reviewed with patient and his wife.  Patient to continue home supplemental oxygen to maintain pulse ox above 90%.  Patient to keep head end of the bed elevated at 30° all the time.  Patient to complete antibiotic therapy and steroid taper as directed.  Patient will continue PEG care as before.  Patient to follow-up with primary care physician next week. Patient was deemed appropriate for discharge.  At the time of discharge, the plan of care was discussed with patient/family, who were agreeable and amenable.  All medications were verbally reviewed and discussed with the patient/family.  They endorsed understanding and compliance.  Informed them that these changes will be available on their discharge paperwork, as well.  Outpatient follow-ups were scheduled, or if unable to be scheduled ambulatory referrals were placed, and ER return precautions were given.  All questions were answered to the patient/family's satisfaction.  she was subsequently discharged in stable condition. Follow-up with PCP next week. For worsening symptoms, chest pain, shortness of breath, increased abdominal pain, high grade fever, stroke or stroke like symptoms, immediately go to the nearest Emergency Room or call 911 as soon as possible.       Goals of Care Treatment Preferences:  Code Status: Full Code      SDOH Screening:  The patient declined to be screened for utility difficulties, food insecurity, transport difficulties, housing insecurity, and interpersonal safety, so no concerns could be identified this admission.     Consults:   Consults (From admission, onward)          Status Ordering Provider     Inpatient consult to Midline team  Once        Provider:  (Not yet assigned)     "Completed STONEY DE LA CRUZ     Inpatient consult to Registered Dietitian/Nutritionist  Once        Provider:  (Not yet assigned)    Completed DWIGHT PRADO            Assessment & Plan  Laryngeal cancer s/p surgery and chemo    Aware, patient has chronic PEG tube feeding.  Nothing by mouth strictly.   Consult dietitian for tube feeding.   Patient has chronic dysphagia/ dysarthria.   COPD with acute exacerbation  Patient's COPD is with exacerbation noted by continued dyspnea and use of accessory muscles for breathing currently.  Patient is currently on COPD Pathway. Continue scheduled inhalers Steroids, Antibiotics, and Supplemental oxygen and monitor respiratory status closely.       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   Improved, cut down the dose of steroids.   PEG tube     Aware, consult dietitian  Bronchiectasis  Continue home vest therapy.       Sepsis  This patient does have evidence of infective focus  My overall impression is sepsis.  Source: Respiratory  Antibiotics given-   Antibiotics (72h ago, onward)      Start     Stop Route Frequency Ordered    03/16/25 0945  metronidazole IVPB 500 mg         -- IV Every 8 hours (non-standard times) 03/16/25 0842    03/14/25 0915  ceFEPIme injection 2 g         -- IV Every 8 hours (non-standard times) 03/14/25 0803          Latest lactate reviewed-  No results for input(s): "LACTATE", "POCLAC" in the last 72 hours.    Organ dysfunction indicated by Acute respiratory failure    Fluid challenge we will give fluids    Post- resuscitation assessment Yes - I attest a sepsis perfusion exam was performed within 6 hours of sepsis, severe sepsis, or septic shock presentation, following fluid resuscitation.      Will Not start Pressors- Levophed for MAP of 65  Source control achieved by:     MRSA negative.   Discontinue vancomycin.   CTA of the chest reviewed, no significant pneumonia.  UA negative.   Get CTA abdomen and pelvis to rule out " intra-abdominal infectious source.   Continue cefepime and azithromycin.   Continue low rate IV hydration  Possible Community acquired pneumonia  Patient has a diagnosis of pneumonia. The cause of the pneumonia is suspected to be bacterial in etiology but organism is not known. The pneumonia is stable. The patient has the following signs/symptoms of pneumonia: persistent hypoxia  and cough. The patient does have a current oxygen requirement and the patient does have a home oxygen requirement. I have reviewed the pertinent imaging. The following cultures have been collected: Blood cultures and Sputum culture The culture results are listed below.     Current antimicrobial regimen consists of the antibiotics listed below. Will monitor patient closely and continue current treatment plan unchanged.    Antibiotics (From admission, onward)      Start     Stop Route Frequency Ordered    03/16/25 0945  metronidazole IVPB 500 mg         -- IV Every 8 hours (non-standard times) 03/16/25 0842    03/14/25 0915  ceFEPIme injection 2 g         -- IV Every 8 hours (non-standard times) 03/14/25 0803            Microbiology Results (last 7 days)       Procedure Component Value Units Date/Time    Blood culture x two cultures. Draw prior to antibiotics [[id number]] Collected: 03/14/25 0653    Order Status: Completed Specimen: Blood Updated: 03/17/25 0832     Blood Culture, Routine No Growth to date      No Growth to date      No Growth to date      No Growth to date    Narrative:      Aerobic and anaerobic  Collection has been rescheduled by MYB at 03/14/2025 00:30 Reason:   Unable to collect 2nd set. Nurse Notified  Collection has been rescheduled by MYB at 03/14/2025 00:30 Reason:   Unable to collect 2nd set. Nurse Notified    Culture, Respiratory with Gram Stain [[id number]] Collected: 03/17/25 0753    Order Status: Sent Specimen: Respiratory from Sputum, Expectorated Updated: 03/17/25 0803    Blood culture x two cultures. Draw  prior to antibiotics [[id number]] Collected: 03/13/25 2344    Order Status: Completed Specimen: Blood from Peripheral, Hand, Right Updated: 03/17/25 0232     Blood Culture, Routine No Growth to date      No Growth to date      No Growth to date      No Growth to date    Narrative:      Aerobic and anaerobic    MRSA Screen by PCR [[id number]] Collected: 03/14/25 1343    Order Status: Completed Specimen: Nasal Swab Updated: 03/14/25 1651     MRSA SCREEN BY PCR Not Detected    Respiratory Infection Panel (PCR), Nasopharyngeal [[id number]] Collected: 03/14/25 0902    Order Status: Completed Specimen: Nasopharyngeal Swab Updated: 03/14/25 1103     Respiratory Infection Panel Source NP swab     Adenovirus Not Detected     Coronavirus 229E, Common Cold Virus Not Detected     Coronavirus HKU1, Common Cold Virus Not Detected     Coronavirus NL63, Common Cold Virus Not Detected     Coronavirus OC43, Common Cold Virus Not Detected     Comment: Coronavirus strains 229E, HKU1, NL63, and OC43 can cause the common   cold   and are not associated with the respiratory disease outbreak caused   by  the COVID-19 (SARS-CoV-2 novel Coronavirus) strain.           SARS-CoV2 (COVID-19) Qualitative PCR Not Detected     Human Metapneumovirus Not Detected     Human Rhinovirus/Enterovirus Not Detected     Influenza A Not Detected     Influenza B Not Detected     Parainfluenza Virus 1 Not Detected     Parainfluenza Virus 2 Not Detected     Parainfluenza Virus 3 Not Detected     Parainfluenza Virus 4 Not Detected     Respiratory Syncytial Virus Not Detected     Bordetella Parapertussis (JB0565) Not Detected     Bordetella pertussis (ptxP) Not Detected     Chlamydia pneumoniae Not Detected     Mycoplasma pneumoniae Not Detected     Comment: Respiratory Infection Panel testing performed by Multiplex PCR.       Narrative:      Respiratory Infection Panel source->NP Swab            Empiric cefepime azithromycin  Severe malnutrition  Nutrition  consulted. Most recent weight and BMI monitored-     Measurements:  Wt Readings from Last 1 Encounters:   03/17/25 48.5 kg (106 lb 14.8 oz)   Body mass index is 17.79 kg/m².    Patient has been screened and assessed by RD.    Malnutrition Type:  Context:    Level:      Malnutrition Characteristic Summary:       Interventions/Recommendations (treatment strategy):  1.) Will begin Glucerna 1.5-5 cartons daily to provide 1780 kcals, 100gm protein and 900mL of free water. Suggest FWF 100mL after each feed. TF to meet 100% EEN and EPN. 2.) If kitchen out of Glucerna, suggest Diabetisource-6 cans daily to provide 1800 kcals, 90gm protein and 1224mL of free water. Suggest FWF 80mL after each feed. TF to meet 100% EEN and EPN. 3.) RD to perform NFPE at follow up.    Acute on chronic respiratory failure with hypoxia  Patient with Hypoxic Respiratory failure which is Acute on chronic.  he is on home oxygen at 2 LPM. Supplemental oxygen was provided and noted- Oxygen Concentration (%):  [28] 28  Signs/symptoms of respiratory failure include- increased work of breathing, respiratory distress, and use of accessory muscles. Contributing diagnoses includes - COPD and Pneumonia Labs and images were reviewed. Will treat underlying causes and adjust management of respiratory failure as follows  Antibiotics and steroids  Final Active Diagnoses:    Diagnosis Date Noted POA    PRINCIPAL PROBLEM:  Acute on chronic respiratory failure with hypoxia [J96.21] 03/16/2025 Yes    Sepsis [A41.9] 03/14/2025 Yes    Possible Community acquired pneumonia [J18.9] 03/14/2025 Yes    Severe malnutrition [E43] 03/14/2025 Yes    COPD with acute exacerbation [J44.1] 06/04/2023 Yes    Laryngeal cancer s/p surgery and chemo [C32.9] 06/04/2023 Yes    PEG tube  [K94.23] 06/04/2023 Yes    Bronchiectasis [J47.9] 06/04/2023 Yes      Problems Resolved During this Admission:       Discharged Condition: good    Disposition: Home or Self Care    Follow Up:   Follow-up  Information       [facility], Provider Follow up in 1 week(s).                           Patient Instructions:      Diet NPO     Notify your health care provider if you experience any of the following:  increased confusion or weakness     Notify your health care provider if you experience any of the following:  persistent dizziness, light-headedness, or visual disturbances     Notify your health care provider if you experience any of the following:  worsening rash     Notify your health care provider if you experience any of the following:  severe persistent headache     Notify your health care provider if you experience any of the following:  difficulty breathing or increased cough     Notify your health care provider if you experience any of the following:  redness, tenderness, or signs of infection (pain, swelling, redness, odor or green/yellow discharge around incision site)     Notify your health care provider if you experience any of the following:  severe uncontrolled pain     Notify your health care provider if you experience any of the following:  persistent nausea and vomiting or diarrhea     Notify your health care provider if you experience any of the following:  temperature >100.4     Tube Feedings/Formulas   Order Comments: Glucerna 1.5 6 cans daily as before with 90 cc free water flush after each feeding.     Order Specific Question Answer Comments   Select Adult Formula: Other    Route: Gastrostomy      Activity as tolerated   Order Comments: Up with assist, observe fall precautions, rotate patient every 2 hours, keep head end of the bed elevated 30° all the time.       Significant Diagnostic Studies: Labs: CMP   Recent Labs   Lab 03/16/25  0504 03/17/25  0322    144   K 4.1 4.1    108   CO2 28 28   * 124*   BUN 22 26*   CREATININE 0.5 0.5   CALCIUM 8.7 8.4*   PROT 5.6* 4.6*   ALBUMIN 2.9* 2.5*   BILITOT 0.3 0.3   ALKPHOS 80 73   AST 15 13   ALT 23 20   ANIONGAP 7* 8   , CBC  "  Recent Labs   Lab 03/16/25  0504 03/17/25  0322   WBC 27.44* 25.73*   HGB 11.6* 12.3*   HCT 37.7* 40.1    315   , INR   Lab Results   Component Value Date    INR 1.0 09/03/2024    INR 0.9 03/28/2023    INR 0.9 05/04/2021   , Lipid Panel   Lab Results   Component Value Date    CHOL 157 06/09/2022    HDL 35 (L) 06/09/2022    LDLCALC 70 06/09/2022    TRIG 258 (H) 06/09/2022    CHOLHDL 4.49 06/09/2022   , and Troponin No results for input(s): "TROPONINI" in the last 168 hours.  Microbiology: Blood Culture   Lab Results   Component Value Date    LABBLOO No Growth to date 03/14/2025    LABBLOO No Growth to date 03/14/2025    LABBLOO No Growth to date 03/14/2025    LABBLOO No Growth to date 03/14/2025   , Sputum Culture   Lab Results   Component Value Date    GSRESP Many WBC's 06/04/2023    GSRESP <10 epithelial cells per low power field. 06/04/2023    GSRESP Many Gram negative rods 06/04/2023    GSRESP Few Gram positive cocci 06/04/2023    RESPIRATORYC PSEUDOMONAS AERUGINOSA  Moderate   (A) 06/04/2023   , and Urine Culture  No results found for: "LABURIN"    Pending Diagnostic Studies:       None           Medications:  Reconciled Home Medications:      Medication List        START taking these medications      doxycycline 100 MG tablet  Commonly known as: VIBRA-TABS  1 tablet (100 mg total) by Per G Tube route 2 (two) times daily. for 7 days     metroNIDAZOLE 500 MG tablet  Commonly known as: FLAGYL  1 tablet (500 mg total) by Per G Tube route 3 (three) times daily. for 7 days     predniSONE 5 mg/5 mL Soln  Take 30 cc daily for 4 days and then   Take 20 cc daily for 4 days and then   Take 10 cc daily for 4 days and stop.            CONTINUE taking these medications      * albuterol 2.5 mg /3 mL (0.083 %) nebulizer solution  Commonly known as: PROVENTIL  Take 2.5 mg by nebulization 2 (two) times a day.     * albuterol 90 mcg/actuation inhaler  Commonly known as: PROVENTIL/VENTOLIN HFA  Inhale 1-2 puffs into the " lungs every 6 (six) hours as needed for Wheezing.     amitriptyline 50 MG tablet  Commonly known as: ELAVIL  Take 50 mg by mouth every evening.     BREZTRI AEROSPHERE 160-9-4.8 mcg/actuation Hfaa  Generic drug: budesonide-glycopyr-formoterol  Inhale 2 puffs into the lungs 2 (two) times daily.     famotidine 20 MG tablet  Commonly known as: PEPCID  Take 20 mg by mouth 2 (two) times daily.     FeroSuL 325 mg (65 mg iron) Tab tablet  Generic drug: ferrous sulfate  Take 325 mg by mouth every other day.     fluticasone propionate 50 mcg/actuation nasal spray  Commonly known as: FLONASE  1 spray by Each Nostril route once daily.     levothyroxine 125 MCG tablet  Commonly known as: SYNTHROID  Take 125 mcg by mouth every evening.     oxyCODONE 20 mg Tab immediate release tablet  Commonly known as: ROXICODONE  Take 1 tablet by mouth 3 (three) times daily as needed for Pain.     roflumilast 500 mcg Tab  Commonly known as: DALIRESP  Take 500 mcg by mouth once daily.     sodium chloride 3% 3 % nebulizer solution  Take 4 mLs by nebulization 2 (two) times a day.           * This list has 2 medication(s) that are the same as other medications prescribed for you. Read the directions carefully, and ask your doctor or other care provider to review them with you.                STOP taking these medications      azithromycin 500 MG tablet  Commonly known as: ZITHROMAX            ASK your doctor about these medications      hydrOXYzine HCL 25 MG tablet  Commonly known as: ATARAX  Take 12.5 mg by mouth 3 (three) times daily as needed.              Indwelling Lines/Drains at time of discharge:   Lines/Drains/Airways       Drain  Duration                  Gastrostomy/Enterostomy  LUQ feeding -- days                    Time spent on the discharge of patient: 35 minutes         Zoey Copeland MD  Department of Hospital Medicine  [facility]

## 2025-03-17 NOTE — PLAN OF CARE
Follow up appointment with discharge clinic scheduled and added to AVS. Per spouse, pt already has appointment with nutritionist and pulmonologist tomorrow, instructed to keep previously scheduled appointments.No further needs known at this time. Pt to discharge home with spouse to provide transport at discharge. Pt and spouse declined home health services.      03/17/25 1236   Final Note   Assessment Type Final Discharge Note   Anticipated Discharge Disposition Home   What phone number can be called within the next 1-3 days to see how you are doing after discharge? [phone]   Hospital Resources/Appts/Education Provided Provided patient/caregiver with written discharge plan information;Appointments scheduled and added to AVS   Post-Acute Status   Discharge Delays None known at this time

## 2025-03-17 NOTE — PROGRESS NOTES
[facility]  Progress Note    Patient Name: Reji Romano  MRN: [id number]  Patient Class: IP- Inpatient   Admission Date: 3/13/2025  Length of Stay: 3 days  Attending Physician: Zoey Copeland MD  Primary Care Provider: Brenda, Provider        Subjective     Principal Problem:Acute on chronic respiratory failure with hypoxia        HPI:  This is a 70-year-old  male with a history of severe COPD, chronic hypoxic respiratory failure on 2 L nasal cannula 24/7, severe cachexia with a BMI 18.3,  throat cancer status post chemoradiation with chronic dysphagia on PEG tube feeding presenting here for worsened shortness of breaths for past few days.  Also reported may have altered mental status, however patient is awake alert when I evaluated him.  Patient has severe dysarthria secondary to previous throat cancer, history is obtained from wife at bedside.  Patient apparently has been feel unwell for few days, no sick contacts.  No chest pain.  No fever or chills.  Patient has PEG tube with every feeding/ medication through this.  Patient has is absolutely nothing by mouth.  Patient also has a chest vest on 3% normal saline nebulizer at home.   In the ER, patient is afebrile, hemodynamically stable.  WBC count is 40 K. patient did not received any steroids prior to this as per wife.     Overview/Hospital Course:  70-year-old male with a history of throat cancer status post chemoradiation, chronic dysphagia/ dysarthria PEG tube feeding, COPD on 2 L home ,chronic Pseudomonas colonization came in with worsening SOB and admitted for acute on chronic hypoxic respiratory failure likely multifactorial including COPD exacerbation wth possible pneumonia (elevated processioning).  CT abdomen showed possible colitis and patient was started on antibiotics.  CTA of the chest was negative for PE.  Slowly wean off steroids as needed for COPD exacerbation.    Interval History:  Frail elderly male  with increase work of breathing, poor historian, asking for more pain medication, anxious daughter at bedside site. Patient wanting to go home. Diarrhea has improved. Denies any chest pain or any focal neurological complaints. Dc plans cancelled. Patient not happy.     Review of Systems   All other systems reviewed and are negative.    Objective:     Vital Signs (Most Recent):  Temp: 97.6 °F (36.4 °C) (03/17/25 1104)  Pulse: (!) 122 (03/17/25 1453)  Resp: (!) 33 (03/17/25 1453)  BP: (!) 150/96 (03/17/25 1300)  SpO2: 96 % (continuous pulse ox not reading) (03/17/25 1453) Vital Signs (24h Range):  Temp:  [97.5 °F (36.4 °C)-98.2 °F (36.8 °C)] 97.6 °F (36.4 °C)  Pulse:  [] 122  Resp:  [14-46] 33  SpO2:  [90 %-100 %] 96 %  BP: ()/(61-96) 150/96     Weight: 48.5 kg (106 lb 14.8 oz)  Body mass index is 17.79 kg/m².    Intake/Output Summary (Last 24 hours) at 3/17/2025 1515  Last data filed at 3/17/2025 1340  Gross per 24 hour   Intake 870.04 ml   Output 200 ml   Net 670.04 ml         Physical Exam  Constitutional:       Appearance: He is ill-appearing.   Cardiovascular:      Rate and Rhythm: Normal rate.      Pulses: Normal pulses.   Pulmonary:      Breath sounds: Wheezing and rales present.   Abdominal:      General: There is no distension.      Comments: PEG tube in place   Neurological:      Mental Status: He is oriented to person, place, and time.               Significant Labs:   Lab Results   Component Value Date    WBC 25.73 (H) 03/17/2025    HGB 12.3 (L) 03/17/2025    HCT 46 03/17/2025    MCV 93 03/17/2025     03/17/2025       CMP  Sodium   Date Value Ref Range Status   03/17/2025 144 136 - 145 mmol/L Final     Potassium   Date Value Ref Range Status   03/17/2025 4.1 3.5 - 5.1 mmol/L Final     Chloride   Date Value Ref Range Status   03/17/2025 108 95 - 110 mmol/L Final     CO2   Date Value Ref Range Status   03/17/2025 28 23 - 29 mmol/L Final     Glucose   Date Value Ref Range Status    03/17/2025 124 (H) 70 - 110 mg/dL Final     BUN   Date Value Ref Range Status   03/17/2025 26 (H) 8 - 23 mg/dL Final     Creatinine   Date Value Ref Range Status   03/17/2025 0.5 0.5 - 1.4 mg/dL Final     Calcium   Date Value Ref Range Status   03/17/2025 8.4 (L) 8.7 - 10.5 mg/dL Final     Total Protein   Date Value Ref Range Status   03/17/2025 4.6 (L) 6.0 - 8.4 g/dL Final     Albumin   Date Value Ref Range Status   03/17/2025 2.5 (L) 3.5 - 5.2 g/dL Final     Total Bilirubin   Date Value Ref Range Status   03/17/2025 0.3 0.1 - 1.0 mg/dL Final     Comment:     For infants and newborns, interpretation of results should be based  on gestational age, weight and in agreement with clinical  observations.    Premature Infant recommended reference ranges:  Up to 24 hours.............<8.0 mg/dL  Up to 48 hours............<12.0 mg/dL  3-5 days..................<15.0 mg/dL  6-29 days.................<15.0 mg/dL       Alkaline Phosphatase   Date Value Ref Range Status   03/17/2025 73 55 - 135 U/L Final     AST   Date Value Ref Range Status   03/17/2025 13 10 - 40 U/L Final     ALT   Date Value Ref Range Status   03/17/2025 20 10 - 44 U/L Final     Anion Gap   Date Value Ref Range Status   03/17/2025 8 8 - 16 mmol/L Final     eGFR   Date Value Ref Range Status   03/17/2025 >60.0 >60 mL/min/1.73 m^2 Final     Microbiology Results (last 7 days)       Procedure Component Value Units Date/Time    Blood culture x two cultures. Draw prior to antibiotics [[id number]] Collected: 03/14/25 0653    Order Status: Completed Specimen: Blood Updated: 03/17/25 0832     Blood Culture, Routine No Growth to date      No Growth to date      No Growth to date      No Growth to date    Narrative:      Aerobic and anaerobic  Collection has been rescheduled by MYB at 03/14/2025 00:30 Reason:   Unable to collect 2nd set. Nurse Notified  Collection has been rescheduled by MYB at 03/14/2025 00:30 Reason:   Unable to collect 2nd set. Nurse Notified     Culture, Respiratory with Gram Stain [[id number]] Collected: 03/17/25 0753    Order Status: Sent Specimen: Respiratory from Sputum, Expectorated Updated: 03/17/25 0803    Blood culture x two cultures. Draw prior to antibiotics [[id number]] Collected: 03/13/25 2344    Order Status: Completed Specimen: Blood from Peripheral, Hand, Right Updated: 03/17/25 0232     Blood Culture, Routine No Growth to date      No Growth to date      No Growth to date      No Growth to date    Narrative:      Aerobic and anaerobic    MRSA Screen by PCR [[id number]] Collected: 03/14/25 1343    Order Status: Completed Specimen: Nasal Swab Updated: 03/14/25 1651     MRSA SCREEN BY PCR Not Detected    Respiratory Infection Panel (PCR), Nasopharyngeal [[id number]] Collected: 03/14/25 0902    Order Status: Completed Specimen: Nasopharyngeal Swab Updated: 03/14/25 1103     Respiratory Infection Panel Source NP swab     Adenovirus Not Detected     Coronavirus 229E, Common Cold Virus Not Detected     Coronavirus HKU1, Common Cold Virus Not Detected     Coronavirus NL63, Common Cold Virus Not Detected     Coronavirus OC43, Common Cold Virus Not Detected     Comment: Coronavirus strains 229E, HKU1, NL63, and OC43 can cause the common   cold   and are not associated with the respiratory disease outbreak caused   by  the COVID-19 (SARS-CoV-2 novel Coronavirus) strain.           SARS-CoV2 (COVID-19) Qualitative PCR Not Detected     Human Metapneumovirus Not Detected     Human Rhinovirus/Enterovirus Not Detected     Influenza A Not Detected     Influenza B Not Detected     Parainfluenza Virus 1 Not Detected     Parainfluenza Virus 2 Not Detected     Parainfluenza Virus 3 Not Detected     Parainfluenza Virus 4 Not Detected     Respiratory Syncytial Virus Not Detected     Bordetella Parapertussis ([id number]) Not Detected     Bordetella pertussis (ptxP) Not Detected     Chlamydia pneumoniae Not Detected     Mycoplasma pneumoniae Not Detected     Comment:  Respiratory Infection Panel testing performed by Multiplex PCR.       Narrative:      Respiratory Infection Panel source->NP Swab          Significant Imaging:   CT abdomen and Pelvis with IV contrast:  1.  Moderate to severe colonic wall thickening in the ascending and rectosigmoid colon, with mild to moderate colonic wall thickening in the remainder of the colon compatible with pan-colitis (likely infectious/inflammatory, and or neutropenic, and less likely from vasculitis, ischemia or lymphoma), consider correlation with the symptoms, history and attention on follow-up imaging.  2.  Peg tube with balloon inflated in the body of the stomach.  No finding of bowel obstruction, intra-abdominal free air or leak.  3.  Numerous additional, and incidental findings as above.    ECHO:    Left Ventricle: The left ventricle is normal in size. Normal wall thickness. There is normal systolic function with a visually estimated ejection fraction of 65 - 70%. There is normal diastolic function.    Right Ventricle: The right ventricle is normal in size. Systolic function is normal.    IVC/SVC: Normal venous pressure at 3 mmHg.    CXR:  Small bilateral pleural effusions with adjacent airspace disease or atelectasis, left greater than right. Background of chronic interstitial changes. Normal heart size.     CTA Chest:  1. No evidence of pulmonary embolism to the segmental arterial level. If there is continued clinical concern, consider further evaluation with lower extremity doppler.  2. Mild bronchiectasis with a central hilar lower lobe predominance.  3.  Minimal scattered punctate nodules present, partially obscured by motion artifact suggesting either minimal infection, atelectasis or scarring.  The largest measurable nodule is 4 mm in diameter, and is unchanged from the previous CT, for which no additional follow-up will be warranted.      Assessment & Plan  Laryngeal cancer s/p surgery and chemo    Aware, patient has chronic  "PEG tube feeding.  Nothing by mouth strictly.   Consult dietitian for tube feeding.   Patient has chronic dysphagia/ dysarthria.   COPD with acute exacerbation  Patient's COPD is with exacerbation noted by continued dyspnea and use of accessory muscles for breathing currently.  Patient is currently on COPD Pathway. Continue scheduled inhalers Steroids, Antibiotics, and Supplemental oxygen and monitor respiratory status closely.       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   On IV solumedrol 40 q 8 hrs.  PEG tube     Aware, consult dietitian  Bronchiectasis  Continue home vest therapy.       Sepsis  This patient does have evidence of infective focus  My overall impression is sepsis.  Source: Respiratory  Antibiotics given-   Antibiotics (72h ago, onward)      Start     Stop Route Frequency Ordered    03/16/25 0945  metronidazole IVPB 500 mg         -- IV Every 8 hours (non-standard times) 03/16/25 0842    03/14/25 0915  ceFEPIme injection 2 g         -- IV Every 8 hours (non-standard times) 03/14/25 0803          Latest lactate reviewed-  No results for input(s): "LACTATE", "POCLAC" in the last 72 hours.    Organ dysfunction indicated by Acute respiratory failure    Fluid challenge we will give fluids    Post- resuscitation assessment Yes - I attest a sepsis perfusion exam was performed within 6 hours of sepsis, severe sepsis, or septic shock presentation, following fluid resuscitation.      Will Not start Pressors- Levophed for MAP of 65  Source control achieved by:     MRSA negative.   Discontinue vancomycin.   CTA of the chest reviewed, no significant pneumonia.  UA negative.   Get CTA abdomen and pelvis to rule out intra-abdominal infectious source.   Continue cefepime and azithromycin.   Continue low rate IV hydration  Possible Community acquired pneumonia  Patient has a diagnosis of pneumonia. The cause of the pneumonia is suspected to be bacterial in etiology but organism is not " known. The pneumonia is stable. The patient has the following signs/symptoms of pneumonia: persistent hypoxia  and cough. The patient does have a current oxygen requirement and the patient does have a home oxygen requirement. I have reviewed the pertinent imaging. The following cultures have been collected: Blood cultures and Sputum culture The culture results are listed below.     Current antimicrobial regimen consists of the antibiotics listed below. Will monitor patient closely and continue current treatment plan unchanged.    Antibiotics (From admission, onward)      Start     Stop Route Frequency Ordered    03/16/25 0945  metronidazole IVPB 500 mg         -- IV Every 8 hours (non-standard times) 03/16/25 0842    03/14/25 0915  ceFEPIme injection 2 g         -- IV Every 8 hours (non-standard times) 03/14/25 0803            Microbiology Results (last 7 days)       Procedure Component Value Units Date/Time    Blood culture x two cultures. Draw prior to antibiotics [[id number]] Collected: 03/14/25 0653    Order Status: Completed Specimen: Blood Updated: 03/17/25 0832     Blood Culture, Routine No Growth to date      No Growth to date      No Growth to date      No Growth to date    Narrative:      Aerobic and anaerobic  Collection has been rescheduled by MYB at 03/14/2025 00:30 Reason:   Unable to collect 2nd set. Nurse Notified  Collection has been rescheduled by [facility] at 03/14/2025 00:30 Reason:   Unable to collect 2nd set. Nurse Notified    Culture, Respiratory with Gram Stain [[id number]] Collected: 03/17/25 0753    Order Status: Sent Specimen: Respiratory from Sputum, Expectorated Updated: 03/17/25 0803    Blood culture x two cultures. Draw prior to antibiotics [[id number]] Collected: 03/13/25 2344    Order Status: Completed Specimen: Blood from Peripheral, Hand, Right Updated: 03/17/25 0232     Blood Culture, Routine No Growth to date      No Growth to date      No Growth to date      No Growth to date     Narrative:      Aerobic and anaerobic    MRSA Screen by PCR [[id number]] Collected: 03/14/25 1343    Order Status: Completed Specimen: Nasal Swab Updated: 03/14/25 1651     MRSA SCREEN BY PCR Not Detected    Respiratory Infection Panel (PCR), Nasopharyngeal [[id number]] Collected: 03/14/25 0902    Order Status: Completed Specimen: Nasopharyngeal Swab Updated: 03/14/25 1103     Respiratory Infection Panel Source NP swab     Adenovirus Not Detected     Coronavirus 229E, Common Cold Virus Not Detected     Coronavirus HKU1, Common Cold Virus Not Detected     Coronavirus NL63, Common Cold Virus Not Detected     Coronavirus OC43, Common Cold Virus Not Detected     Comment: Coronavirus strains 229E, HKU1, NL63, and OC43 can cause the common   cold   and are not associated with the respiratory disease outbreak caused   by  the COVID-19 (SARS-CoV-2 novel Coronavirus) strain.           SARS-CoV2 (COVID-19) Qualitative PCR Not Detected     Human Metapneumovirus Not Detected     Human Rhinovirus/Enterovirus Not Detected     Influenza A Not Detected     Influenza B Not Detected     Parainfluenza Virus 1 Not Detected     Parainfluenza Virus 2 Not Detected     Parainfluenza Virus 3 Not Detected     Parainfluenza Virus 4 Not Detected     Respiratory Syncytial Virus Not Detected     Bordetella Parapertussis ([id number]) Not Detected     Bordetella pertussis (ptxP) Not Detected     Chlamydia pneumoniae Not Detected     Mycoplasma pneumoniae Not Detected     Comment: Respiratory Infection Panel testing performed by Multiplex PCR.       Narrative:      Respiratory Infection Panel source->NP Swab            Empiric cefepime azithromycin  Severe malnutrition  Nutrition consulted. Most recent weight and BMI monitored-     Measurements:  Wt Readings from Last 1 Encounters:   03/17/25 48.5 kg (106 lb 14.8 oz)   Body mass index is 17.79 kg/m².    Patient has been screened and assessed by RD.    Malnutrition Type:  Context:    Level:       Malnutrition Characteristic Summary:       Interventions/Recommendations (treatment strategy):  1.) Will begin Glucerna 1.5-5 cartons daily to provide 1780 kcals, 100gm protein and 900mL of free water. Suggest FWF 100mL after each feed. TF to meet 100% EEN and EPN. 2.) If kitchen out of Glucerna, suggest Diabetisource-6 cans daily to provide 1800 kcals, 90gm protein and 1224mL of free water. Suggest FWF 80mL after each feed. TF to meet 100% EEN and EPN. 3.) RD to perform NFPE at follow up.    Acute on chronic respiratory failure with hypoxia  Patient with Hypoxic Respiratory failure which is Acute on chronic.  he is on home oxygen at 2 LPM. Supplemental oxygen was provided and noted- Oxygen Concentration (%):  [28] 28  Signs/symptoms of respiratory failure include- increased work of breathing, respiratory distress, and use of accessory muscles. Contributing diagnoses includes - COPD and Pneumonia Labs and images were reviewed. Will treat underlying causes and adjust management of respiratory failure as follows  Antibiotics and steroids    D/W daughter, bedside nurse and . Cancelling DC plans for now. Daughter updated.   VTE Risk Mitigation (From admission, onward)           Ordered     enoxaparin injection 40 mg  Daily         03/14/25 0803     IP VTE HIGH RISK PATIENT  Once         03/14/25 0803     Place sequential compression device  Until discontinued         03/14/25 0803                    Discharge Planning   JENNA: 3/17/2025     Code Status: Full Code   Medical Readiness for Discharge Date: 3/17/2025  Discharge Plan A: Home   Discharge Delays: None known at this time            Please place Justification for DME        Zoey Copeland MD  Department of Hospital Medicine   [facility]

## 2025-03-17 NOTE — ASSESSMENT & PLAN NOTE
Patient has a diagnosis of pneumonia. The cause of the pneumonia is suspected to be bacterial in etiology but organism is not known. The pneumonia is stable. The patient has the following signs/symptoms of pneumonia: persistent hypoxia  and cough. The patient does have a current oxygen requirement and the patient does have a home oxygen requirement. I have reviewed the pertinent imaging. The following cultures have been collected: Blood cultures and Sputum culture The culture results are listed below.     Current antimicrobial regimen consists of the antibiotics listed below. Will monitor patient closely and continue current treatment plan unchanged.    Antibiotics (From admission, onward)      Start     Stop Route Frequency Ordered    03/16/25 0945  metronidazole IVPB 500 mg         -- IV Every 8 hours (non-standard times) 03/16/25 0842    03/14/25 0915  ceFEPIme injection 2 g         -- IV Every 8 hours (non-standard times) 03/14/25 0803            Microbiology Results (last 7 days)       Procedure Component Value Units Date/Time    Blood culture x two cultures. Draw prior to antibiotics [[id number]] Collected: 03/14/25 0653    Order Status: Completed Specimen: Blood Updated: 03/17/25 0832     Blood Culture, Routine No Growth to date      No Growth to date      No Growth to date      No Growth to date    Narrative:      Aerobic and anaerobic  Collection has been rescheduled by MYB at 03/14/2025 00:30 Reason:   Unable to collect 2nd set. Nurse Notified  Collection has been rescheduled by MYB at 03/14/2025 00:30 Reason:   Unable to collect 2nd set. Nurse Notified    Culture, Respiratory with Gram Stain [[id number]] Collected: 03/17/25 0753    Order Status: Sent Specimen: Respiratory from Sputum, Expectorated Updated: 03/17/25 0803    Blood culture x two cultures. Draw prior to antibiotics [[id number]] Collected: 03/13/25 2344    Order Status: Completed Specimen: Blood from Peripheral, Hand, Right Updated: 03/17/25  0232     Blood Culture, Routine No Growth to date      No Growth to date      No Growth to date      No Growth to date    Narrative:      Aerobic and anaerobic    MRSA Screen by PCR [[id number]] Collected: 03/14/25 1343    Order Status: Completed Specimen: Nasal Swab Updated: 03/14/25 1651     MRSA SCREEN BY PCR Not Detected    Respiratory Infection Panel (PCR), Nasopharyngeal [[id number]] Collected: 03/14/25 0902    Order Status: Completed Specimen: Nasopharyngeal Swab Updated: 03/14/25 1103     Respiratory Infection Panel Source NP swab     Adenovirus Not Detected     Coronavirus 229E, Common Cold Virus Not Detected     Coronavirus HKU1, Common Cold Virus Not Detected     Coronavirus NL63, Common Cold Virus Not Detected     Coronavirus OC43, Common Cold Virus Not Detected     Comment: Coronavirus strains 229E, HKU1, NL63, and OC43 can cause the common   cold   and are not associated with the respiratory disease outbreak caused   by  the COVID-19 (SARS-CoV-2 novel Coronavirus) strain.           SARS-CoV2 (COVID-19) Qualitative PCR Not Detected     Human Metapneumovirus Not Detected     Human Rhinovirus/Enterovirus Not Detected     Influenza A Not Detected     Influenza B Not Detected     Parainfluenza Virus 1 Not Detected     Parainfluenza Virus 2 Not Detected     Parainfluenza Virus 3 Not Detected     Parainfluenza Virus 4 Not Detected     Respiratory Syncytial Virus Not Detected     Bordetella Parapertussis ([id number]) Not Detected     Bordetella pertussis (ptxP) Not Detected     Chlamydia pneumoniae Not Detected     Mycoplasma pneumoniae Not Detected     Comment: Respiratory Infection Panel testing performed by Multiplex PCR.       Narrative:      Respiratory Infection Panel source->NP Swab            Empiric cefepime azithromycin

## 2025-03-17 NOTE — SUBJECTIVE & OBJECTIVE
Interval History:  Frail elderly male with increase work of breathing, poor historian, asking for more pain medication, anxious daughter at bedside site. Patient wanting to go home. Diarrhea has improved. Denies any chest pain or any focal neurological complaints. Dc plans cancelled. Patient not happy.     Review of Systems   All other systems reviewed and are negative.    Objective:     Vital Signs (Most Recent):  Temp: 97.6 °F (36.4 °C) (03/17/25 1104)  Pulse: (!) 122 (03/17/25 1453)  Resp: (!) 33 (03/17/25 1453)  BP: (!) 150/96 (03/17/25 1300)  SpO2: 96 % (continuous pulse ox not reading) (03/17/25 1453) Vital Signs (24h Range):  Temp:  [97.5 °F (36.4 °C)-98.2 °F (36.8 °C)] 97.6 °F (36.4 °C)  Pulse:  [] 122  Resp:  [14-46] 33  SpO2:  [90 %-100 %] 96 %  BP: ()/(61-96) 150/96     Weight: 48.5 kg (106 lb 14.8 oz)  Body mass index is 17.79 kg/m².    Intake/Output Summary (Last 24 hours) at 3/17/2025 1515  Last data filed at 3/17/2025 1340  Gross per 24 hour   Intake 870.04 ml   Output 200 ml   Net 670.04 ml         Physical Exam  Constitutional:       Appearance: He is ill-appearing.   Cardiovascular:      Rate and Rhythm: Normal rate.      Pulses: Normal pulses.   Pulmonary:      Breath sounds: Wheezing and rales present.   Abdominal:      General: There is no distension.      Comments: PEG tube in place   Neurological:      Mental Status: He is oriented to person, place, and time.               Significant Labs:   Lab Results   Component Value Date    WBC 25.73 (H) 03/17/2025    HGB 12.3 (L) 03/17/2025    HCT 46 03/17/2025    MCV 93 03/17/2025     03/17/2025       CMP  Sodium   Date Value Ref Range Status   03/17/2025 144 136 - 145 mmol/L Final     Potassium   Date Value Ref Range Status   03/17/2025 4.1 3.5 - 5.1 mmol/L Final     Chloride   Date Value Ref Range Status   03/17/2025 108 95 - 110 mmol/L Final     CO2   Date Value Ref Range Status   03/17/2025 28 23 - 29 mmol/L Final     Glucose    Date Value Ref Range Status   03/17/2025 124 (H) 70 - 110 mg/dL Final     BUN   Date Value Ref Range Status   03/17/2025 26 (H) 8 - 23 mg/dL Final     Creatinine   Date Value Ref Range Status   03/17/2025 0.5 0.5 - 1.4 mg/dL Final     Calcium   Date Value Ref Range Status   03/17/2025 8.4 (L) 8.7 - 10.5 mg/dL Final     Total Protein   Date Value Ref Range Status   03/17/2025 4.6 (L) 6.0 - 8.4 g/dL Final     Albumin   Date Value Ref Range Status   03/17/2025 2.5 (L) 3.5 - 5.2 g/dL Final     Total Bilirubin   Date Value Ref Range Status   03/17/2025 0.3 0.1 - 1.0 mg/dL Final     Comment:     For infants and newborns, interpretation of results should be based  on gestational age, weight and in agreement with clinical  observations.    Premature Infant recommended reference ranges:  Up to 24 hours.............<8.0 mg/dL  Up to 48 hours............<12.0 mg/dL  3-5 days..................<15.0 mg/dL  6-29 days.................<15.0 mg/dL       Alkaline Phosphatase   Date Value Ref Range Status   03/17/2025 73 55 - 135 U/L Final     AST   Date Value Ref Range Status   03/17/2025 13 10 - 40 U/L Final     ALT   Date Value Ref Range Status   03/17/2025 20 10 - 44 U/L Final     Anion Gap   Date Value Ref Range Status   03/17/2025 8 8 - 16 mmol/L Final     eGFR   Date Value Ref Range Status   03/17/2025 >60.0 >60 mL/min/1.73 m^2 Final     Microbiology Results (last 7 days)       Procedure Component Value Units Date/Time    Blood culture x two cultures. Draw prior to antibiotics [[id number]] Collected: 03/14/25 0653    Order Status: Completed Specimen: Blood Updated: 03/17/25 0832     Blood Culture, Routine No Growth to date      No Growth to date      No Growth to date      No Growth to date    Narrative:      Aerobic and anaerobic  Collection has been rescheduled by MYB at 03/14/2025 00:30 Reason:   Unable to collect 2nd set. Nurse Notified  Collection has been rescheduled by MYB at 03/14/2025 00:30 Reason:   Unable to  collect 2nd set. Nurse Notified    Culture, Respiratory with Gram Stain [[id number]] Collected: 03/17/25 0753    Order Status: Sent Specimen: Respiratory from Sputum, Expectorated Updated: 03/17/25 0803    Blood culture x two cultures. Draw prior to antibiotics [[id number]] Collected: 03/13/25 2344    Order Status: Completed Specimen: Blood from Peripheral, Hand, Right Updated: 03/17/25 0232     Blood Culture, Routine No Growth to date      No Growth to date      No Growth to date      No Growth to date    Narrative:      Aerobic and anaerobic    MRSA Screen by PCR [[id number]] Collected: 03/14/25 1343    Order Status: Completed Specimen: Nasal Swab Updated: 03/14/25 1651     MRSA SCREEN BY PCR Not Detected    Respiratory Infection Panel (PCR), Nasopharyngeal [[id number]] Collected: 03/14/25 0902    Order Status: Completed Specimen: Nasopharyngeal Swab Updated: 03/14/25 1103     Respiratory Infection Panel Source NP swab     Adenovirus Not Detected     Coronavirus 229E, Common Cold Virus Not Detected     Coronavirus HKU1, Common Cold Virus Not Detected     Coronavirus NL63, Common Cold Virus Not Detected     Coronavirus OC43, Common Cold Virus Not Detected     Comment: Coronavirus strains 229E, HKU1, NL63, and OC43 can cause the common   cold   and are not associated with the respiratory disease outbreak caused   by  the COVID-19 (SARS-CoV-2 novel Coronavirus) strain.           SARS-CoV2 (COVID-19) Qualitative PCR Not Detected     Human Metapneumovirus Not Detected     Human Rhinovirus/Enterovirus Not Detected     Influenza A Not Detected     Influenza B Not Detected     Parainfluenza Virus 1 Not Detected     Parainfluenza Virus 2 Not Detected     Parainfluenza Virus 3 Not Detected     Parainfluenza Virus 4 Not Detected     Respiratory Syncytial Virus Not Detected     Bordetella Parapertussis ([id number]) Not Detected     Bordetella pertussis (ptxP) Not Detected     Chlamydia pneumoniae Not Detected     Mycoplasma  pneumoniae Not Detected     Comment: Respiratory Infection Panel testing performed by Multiplex PCR.       Narrative:      Respiratory Infection Panel source->NP Swab          Significant Imaging:   CT abdomen and Pelvis with IV contrast:  1.  Moderate to severe colonic wall thickening in the ascending and rectosigmoid colon, with mild to moderate colonic wall thickening in the remainder of the colon compatible with pan-colitis (likely infectious/inflammatory, and or neutropenic, and less likely from vasculitis, ischemia or lymphoma), consider correlation with the symptoms, history and attention on follow-up imaging.  2.  Peg tube with balloon inflated in the body of the stomach.  No finding of bowel obstruction, intra-abdominal free air or leak.  3.  Numerous additional, and incidental findings as above.    ECHO:    Left Ventricle: The left ventricle is normal in size. Normal wall thickness. There is normal systolic function with a visually estimated ejection fraction of 65 - 70%. There is normal diastolic function.    Right Ventricle: The right ventricle is normal in size. Systolic function is normal.    IVC/SVC: Normal venous pressure at 3 mmHg.    CXR:  Small bilateral pleural effusions with adjacent airspace disease or atelectasis, left greater than right. Background of chronic interstitial changes. Normal heart size.     CTA Chest:  1. No evidence of pulmonary embolism to the segmental arterial level. If there is continued clinical concern, consider further evaluation with lower extremity doppler.  2. Mild bronchiectasis with a central hilar lower lobe predominance.  3.  Minimal scattered punctate nodules present, partially obscured by motion artifact suggesting either minimal infection, atelectasis or scarring.  The largest measurable nodule is 4 mm in diameter, and is unchanged from the previous CT, for which no additional follow-up will be warranted.

## 2025-03-17 NOTE — ASSESSMENT & PLAN NOTE
Patient has a diagnosis of pneumonia. The cause of the pneumonia is suspected to be bacterial in etiology but organism is not known. The pneumonia is stable. The patient has the following signs/symptoms of pneumonia: persistent hypoxia  and cough. The patient does have a current oxygen requirement and the patient does have a home oxygen requirement. I have reviewed the pertinent imaging. The following cultures have been collected: Blood cultures and Sputum culture The culture results are listed below.     Current antimicrobial regimen consists of the antibiotics listed below. Will monitor patient closely and continue current treatment plan unchanged.    Antibiotics (From admission, onward)      Start     Stop Route Frequency Ordered    03/16/25 0945  metronidazole IVPB 500 mg         -- IV Every 8 hours (non-standard times) 03/16/25 0842    03/14/25 0915  ceFEPIme injection 2 g         -- IV Every 8 hours (non-standard times) 03/14/25 0803            Microbiology Results (last 7 days)       Procedure Component Value Units Date/Time    Blood culture x two cultures. Draw prior to antibiotics [[id number]] Collected: 03/14/25 0653    Order Status: Completed Specimen: Blood Updated: 03/17/25 0832     Blood Culture, Routine No Growth to date      No Growth to date      No Growth to date      No Growth to date    Narrative:      Aerobic and anaerobic  Collection has been rescheduled by MYB at 03/14/2025 00:30 Reason:   Unable to collect 2nd set. Nurse Notified  Collection has been rescheduled by MYB at 03/14/2025 00:30 Reason:   Unable to collect 2nd set. Nurse Notified    Culture, Respiratory with Gram Stain [[id number]] Collected: 03/17/25 0753    Order Status: Sent Specimen: Respiratory from Sputum, Expectorated Updated: 03/17/25 0803    Blood culture x two cultures. Draw prior to antibiotics [[id number]] Collected: 03/13/25 2344    Order Status: Completed Specimen: Blood from Peripheral, Hand, Right Updated: 03/17/25  0232     Blood Culture, Routine No Growth to date      No Growth to date      No Growth to date      No Growth to date    Narrative:      Aerobic and anaerobic    MRSA Screen by PCR [[id number]] Collected: 03/14/25 1343    Order Status: Completed Specimen: Nasal Swab Updated: 03/14/25 1651     MRSA SCREEN BY PCR Not Detected    Respiratory Infection Panel (PCR), Nasopharyngeal [[id number]] Collected: 03/14/25 0902    Order Status: Completed Specimen: Nasopharyngeal Swab Updated: 03/14/25 1103     Respiratory Infection Panel Source NP swab     Adenovirus Not Detected     Coronavirus 229E, Common Cold Virus Not Detected     Coronavirus HKU1, Common Cold Virus Not Detected     Coronavirus NL63, Common Cold Virus Not Detected     Coronavirus OC43, Common Cold Virus Not Detected     Comment: Coronavirus strains 229E, HKU1, NL63, and OC43 can cause the common   cold   and are not associated with the respiratory disease outbreak caused   by  the COVID-19 (SARS-CoV-2 novel Coronavirus) strain.           SARS-CoV2 (COVID-19) Qualitative PCR Not Detected     Human Metapneumovirus Not Detected     Human Rhinovirus/Enterovirus Not Detected     Influenza A Not Detected     Influenza B Not Detected     Parainfluenza Virus 1 Not Detected     Parainfluenza Virus 2 Not Detected     Parainfluenza Virus 3 Not Detected     Parainfluenza Virus 4 Not Detected     Respiratory Syncytial Virus Not Detected     Bordetella Parapertussis ([id number]) Not Detected     Bordetella pertussis (ptxP) Not Detected     Chlamydia pneumoniae Not Detected     Mycoplasma pneumoniae Not Detected     Comment: Respiratory Infection Panel testing performed by Multiplex PCR.       Narrative:      Respiratory Infection Panel source->NP Swab            Empiric cefepime azithromycin

## 2025-03-17 NOTE — ASSESSMENT & PLAN NOTE
Patient has a diagnosis of pneumonia. The cause of the pneumonia is suspected to be bacterial in etiology but organism is not known. The pneumonia is stable. The patient has the following signs/symptoms of pneumonia: persistent hypoxia  and cough. The patient does have a current oxygen requirement and the patient does have a home oxygen requirement. I have reviewed the pertinent imaging. The following cultures have been collected: Blood cultures and Sputum culture The culture results are listed below.     Current antimicrobial regimen consists of the antibiotics listed below. Will monitor patient closely and continue current treatment plan unchanged.    Antibiotics (From admission, onward)      Start     Stop Route Frequency Ordered    03/16/25 0945  metronidazole IVPB 500 mg         -- IV Every 8 hours (non-standard times) 03/16/25 0842    03/14/25 0915  ceFEPIme injection 2 g         -- IV Every 8 hours (non-standard times) 03/14/25 0803            Microbiology Results (last 7 days)       Procedure Component Value Units Date/Time    Culture, Respiratory with Gram Stain [[id number]] Collected: 03/17/25 0753    Order Status: Sent Specimen: Respiratory from Sputum, Expectorated Updated: 03/17/25 0803    Blood culture x two cultures. Draw prior to antibiotics [[id number]] Collected: 03/13/25 2344    Order Status: Completed Specimen: Blood from Peripheral, Hand, Right Updated: 03/17/25 0232     Blood Culture, Routine No Growth to date      No Growth to date      No Growth to date      No Growth to date    Narrative:      Aerobic and anaerobic    Blood culture x two cultures. Draw prior to antibiotics [[id number]] Collected: 03/14/25 0653    Order Status: Completed Specimen: Blood Updated: 03/16/25 0832     Blood Culture, Routine No Growth to date      No Growth to date      No Growth to date    Narrative:      Aerobic and anaerobic  Collection has been rescheduled by MYB at 03/14/2025 00:30 Reason:   Unable to collect  2nd set. Nurse Notified  Collection has been rescheduled by [facility] at 03/14/2025 00:30 Reason:   Unable to collect 2nd set. Nurse Notified    MRSA Screen by PCR [[id number]] Collected: 03/14/25 1343    Order Status: Completed Specimen: Nasal Swab Updated: 03/14/25 1651     MRSA SCREEN BY PCR Not Detected    Respiratory Infection Panel (PCR), Nasopharyngeal [[id number]] Collected: 03/14/25 0902    Order Status: Completed Specimen: Nasopharyngeal Swab Updated: 03/14/25 1103     Respiratory Infection Panel Source NP swab     Adenovirus Not Detected     Coronavirus 229E, Common Cold Virus Not Detected     Coronavirus HKU1, Common Cold Virus Not Detected     Coronavirus NL63, Common Cold Virus Not Detected     Coronavirus OC43, Common Cold Virus Not Detected     Comment: Coronavirus strains 229E, HKU1, NL63, and OC43 can cause the common   cold   and are not associated with the respiratory disease outbreak caused   by  the COVID-19 (SARS-CoV-2 novel Coronavirus) strain.           SARS-CoV2 (COVID-19) Qualitative PCR Not Detected     Human Metapneumovirus Not Detected     Human Rhinovirus/Enterovirus Not Detected     Influenza A Not Detected     Influenza B Not Detected     Parainfluenza Virus 1 Not Detected     Parainfluenza Virus 2 Not Detected     Parainfluenza Virus 3 Not Detected     Parainfluenza Virus 4 Not Detected     Respiratory Syncytial Virus Not Detected     Bordetella Parapertussis ([id number]) Not Detected     Bordetella pertussis (ptxP) Not Detected     Chlamydia pneumoniae Not Detected     Mycoplasma pneumoniae Not Detected     Comment: Respiratory Infection Panel testing performed by Multiplex PCR.       Narrative:      Respiratory Infection Panel source->NP Swab            Empiric cefepime azithromycin

## 2025-03-17 NOTE — SIGNIFICANT EVENT
Hospital Medicine Acute Decompensation    Admit Date: 3/13/2025   LOS: 3  Code Status: Full Code  CC: Acute on chronic respiratory failure with hypoxia  Date of Consult: 03/17/2025  RRT Indication(s): Altered, increased work of breathing  Attending Physician: Zoey Copeland MD  Primary Service: Networked reference to record PCT     SUBJECTIVE:     Interval history: Notified by the nursing, patient appears somewhat altered with increased work of breathing after patient attempted to use commode. Upon arrival, patient visibly in respiratory distress, increased  work of breathing, arousable, moving all extremities, answering questions. Pulse ox difficult to measure.      OBJECTIVE:     Vital Signs(Most Recent):  Temp: 97.6 °F (36.4 °C) (03/17/25 1104)  Pulse: (!) 120 (03/17/25 1517)  Resp: (!) 24 (03/17/25 1517)  BP: (!) 150/96 (03/17/25 1300)  SpO2: 96 % (03/17/25 1517) Vital Signs(24h Range):  Temp:  [97.5 °F (36.4 °C)-98.2 °F (36.8 °C)] 97.6 °F (36.4 °C)  Pulse:  [] 120  Resp:  [14-46] 24  SpO2:  [90 %-100 %] 96 %  BP: ()/(61-96) 150/96     I & O(24h)L    Intake/Output Summary (Last 24 hours) at 3/17/2025 1519  Last data filed at 3/17/2025 1340  Gross per 24 hour   Intake 870.04 ml   Output 200 ml   Net 670.04 ml        Physical Exam: Physical Exam  Elderly frail male  In respiratory distress  Coase bilateral breath sound swith scattered rhonchi and bibasal rales.  PEG site is okay, no definite abdominal tenderness, guarding or rigidity noted.   No peripheral edema    Lines/Drains/Airway:       Gastrostomy/Enterostomy  LUQ feeding (Active)   Securement secured to abdomen 03/16/25 1906   Feeding Type bolus 03/16/25 1906   Dressing dry and intact 03/17/25 0701   Insertion Site redness 03/16/25 1906   Insertion Site Drainage  small 03/15/25 1831   Flush/Irrigation flushed w/;water 03/17/25 0701   Water Bolus (mL) 300 mL 03/16/25 1601   Formula Name glucerna 1.5 03/16/25 1906   Tube Feeding Intake (mL) 250  03/16/25 1601          Nutrition:  Current Diet Order   Procedures    Diet NPO    Diet NPO        Labs/Imaging-   Start ABG:  ABG  Recent Labs   Lab 03/17/25  1453   PH 7.355   PO2 89   PCO2 55.1*   HCO3 30.8*   BE 5*     CXR: reviewed by me; emphysematous changes with bilateral CP amgle blunting and central venous congestion but no infiltrate see. Prior CXR compared.    Diagnostics/Interventions during Rapid response     - ABG  - CXR    ASSESSMENT/PLAN:     Active Hospital Problems    Diagnosis    *Acute on chronic respiratory failure with hypoxia    Sepsis    Possible Community acquired pneumonia    Severe malnutrition    COPD with acute exacerbation    Laryngeal cancer s/p surgery and chemo    PEG tube     Bronchiectasis        Continue supplemental oxygen. ABG reviewed. Avoi dexcessive oxygenation.  Discussed with RT and bedside nurse.  Avoid sedative.  Avoid narcotics.  Consulting pulmonary medicine for opinion and recommendation.  Patient is on Flagyl/Cefepime and Solumedrol.  Continue beta 2 agonists nebulizations.  Keep 30 degree HOB.  Continue pulse.  Lasix 40 mg IV x 1.  Consult palliative medicine. Daughter confirms full code status.        Critical care time spent on the evaluation and treatment of severe organ dysfunction, review of pertinent labs and imaging studies, discussions with consulting providers and discussions with patient/family 45 minutes

## 2025-03-17 NOTE — PLAN OF CARE
Problem: COPD (Chronic Obstructive Pulmonary Disease)  Goal: Optimal Chronic Illness Coping  Outcome: Progressing  Goal: Optimal Level of Functional [place]  Outcome: Progressing  Goal: Absence of Infection Signs and Symptoms  Outcome: Progressing  Goal: Improved Oral Intake  Outcome: Progressing  Goal: Effective Oxygenation and Ventilation  Outcome: Progressing     Problem: Adult Inpatient Plan of Care  Goal: Plan of Care Review  Outcome: Progressing  Goal: Patient-Specific Goal (Individualized)  Outcome: Progressing  Goal: Absence of Hospital-Acquired Illness or Injury  Outcome: Progressing  Goal: Optimal Comfort and Wellbeing  Outcome: Progressing  Goal: Readiness for Transition of Care  Outcome: Progressing     Problem: Sepsis/Septic Shock  Goal: Optimal Coping  Outcome: Progressing  Goal: Absence of Bleeding  Outcome: Progressing  Goal: Blood Glucose Level Within Targeted Range  Outcome: Progressing  Goal: Absence of Infection Signs and Symptoms  Outcome: Progressing  Goal: Optimal Nutrition Intake  Outcome: Progressing     Problem: Pneumonia  Goal: Fluid Balance  Outcome: Progressing  Goal: Resolution of Infection Signs and Symptoms  Outcome: Progressing  Goal: Effective Oxygenation and Ventilation  Outcome: Progressing     Problem: Enteral Nutrition  Goal: Absence of Aspiration Signs and Symptoms  Outcome: Progressing  Goal: Safe, Effective Therapy Delivery  Outcome: Progressing  Goal: Feeding Tolerance  Outcome: Progressing     Problem: Fall Injury Risk  Goal: Absence of Fall and Fall-Related Injury  Outcome: Progressing     Problem: Skin Injury Risk Increased  Goal: Skin Health and Integrity  Outcome: Progressing     Problem: Infection  Goal: Absence of Infection Signs and Symptoms  Outcome: Progressing

## 2025-03-17 NOTE — CARE UPDATE
Patient complaining of shortness of breath, abdominal breathing noted, abg done with normal results, respiratory tx given with cpt, patients work of breathing is decreasing.

## 2025-03-17 NOTE — NURSING
I went into this patients room to discharge him. This patient was very sob, abdominal breathing and saying he felt like he couldn't get any air. I called Dr. Cpoeland to bedside. RT was on the floor and she came to bedside as well. Abg done, stat chest xray done, lasix 40mg IV given and dc cancelled. Patients breathing eased up some. Will continue to monitor.

## 2025-03-17 NOTE — ASSESSMENT & PLAN NOTE
Patient with Hypoxic Respiratory failure which is Acute on chronic.  he is on home oxygen at 2 LPM. Supplemental oxygen was provided and noted- Oxygen Concentration (%):  [28] 28  Signs/symptoms of respiratory failure include- increased work of breathing, respiratory distress, and use of accessory muscles. Contributing diagnoses includes - COPD and Pneumonia Labs and images were reviewed. Will treat underlying causes and adjust management of respiratory failure as follows  Antibiotics and steroids    D/W daughter, bedside nurse and . Cancelling DC plans for now. Daughter updated.

## 2025-03-17 NOTE — PLAN OF CARE
Follow up appointment with discharge clinic scheduled and added to AVS. Per spouse, pt already has appointment with nutritionist and pulmonologist tomorrow, instructed to keep previously scheduled appointments. Home health with [facility] set up with start of care date 3/18 received. No further needs known at this time. Spouse to provide transport at discharge.      03/17/25 1236   Final Note   Assessment Type Final Discharge Note   Anticipated Discharge Disposition Home-Health   What phone number can be called within the next 1-3 days to see how you are doing after discharge? [phone]   Hospital Resources/Appts/Education Provided Provided patient/caregiver with written discharge plan information;Appointments scheduled and added to AVS;Post-Acute resouces added to AVS   Post-Acute Status   Post-Acute Authorization Home Health   Home Health Status Set-up Complete/Auth obtained   Discharge Delays None known at this time

## 2025-03-17 NOTE — SUBJECTIVE & OBJECTIVE
Interval History:  Patient said he feels slightly better    Review of Systems   All other systems reviewed and are negative.    Objective:     Vital Signs (Most Recent):  Temp: 97.8 °F (36.6 °C) (03/17/25 0701)  Pulse: 105 (03/17/25 0728)  Resp: (!) 21 (03/17/25 0728)  BP: 128/82 (03/17/25 0701)  SpO2: 99 % (03/17/25 0728) Vital Signs (24h Range):  Temp:  [97.5 °F (36.4 °C)-98.4 °F (36.9 °C)] 97.8 °F (36.6 °C)  Pulse:  [] 105  Resp:  [14-36] 21  SpO2:  [97 %-100 %] 99 %  BP: ()/(61-93) 128/82     Weight: 48.5 kg (106 lb 14.8 oz)  Body mass index is 17.79 kg/m².    Intake/Output Summary (Last 24 hours) at 3/17/2025 0819  Last data filed at 3/17/2025 0802  Gross per 24 hour   Intake 1374.54 ml   Output 400 ml   Net 974.54 ml         Physical Exam  Constitutional:       Appearance: He is ill-appearing.   Cardiovascular:      Rate and Rhythm: Normal rate.      Pulses: Normal pulses.   Pulmonary:      Breath sounds: Wheezing and rales present.   Abdominal:      General: There is no distension.      Comments: PEG tube in place   Neurological:      Mental Status: He is oriented to person, place, and time.               Significant Labs:   Lab Results   Component Value Date    WBC 25.73 (H) 03/17/2025    HGB 12.3 (L) 03/17/2025    HCT 40.1 03/17/2025    MCV 93 03/17/2025     03/17/2025       CMP  Sodium   Date Value Ref Range Status   03/17/2025 144 136 - 145 mmol/L Final     Potassium   Date Value Ref Range Status   03/17/2025 4.1 3.5 - 5.1 mmol/L Final     Chloride   Date Value Ref Range Status   03/17/2025 108 95 - 110 mmol/L Final     CO2   Date Value Ref Range Status   03/17/2025 28 23 - 29 mmol/L Final     Glucose   Date Value Ref Range Status   03/17/2025 124 (H) 70 - 110 mg/dL Final     BUN   Date Value Ref Range Status   03/17/2025 26 (H) 8 - 23 mg/dL Final     Creatinine   Date Value Ref Range Status   03/17/2025 0.5 0.5 - 1.4 mg/dL Final     Calcium   Date Value Ref Range Status   03/17/2025  8.4 (L) 8.7 - 10.5 mg/dL Final     Total Protein   Date Value Ref Range Status   03/17/2025 4.6 (L) 6.0 - 8.4 g/dL Final     Albumin   Date Value Ref Range Status   03/17/2025 2.5 (L) 3.5 - 5.2 g/dL Final     Total Bilirubin   Date Value Ref Range Status   03/17/2025 0.3 0.1 - 1.0 mg/dL Final     Comment:     For infants and newborns, interpretation of results should be based  on gestational age, weight and in agreement with clinical  observations.    Premature Infant recommended reference ranges:  Up to 24 hours.............<8.0 mg/dL  Up to 48 hours............<12.0 mg/dL  3-5 days..................<15.0 mg/dL  6-29 days.................<15.0 mg/dL       Alkaline Phosphatase   Date Value Ref Range Status   03/17/2025 73 55 - 135 U/L Final     AST   Date Value Ref Range Status   03/17/2025 13 10 - 40 U/L Final     ALT   Date Value Ref Range Status   03/17/2025 20 10 - 44 U/L Final     Anion Gap   Date Value Ref Range Status   03/17/2025 8 8 - 16 mmol/L Final     eGFR   Date Value Ref Range Status   03/17/2025 >60.0 >60 mL/min/1.73 m^2 Final     Microbiology Results (last 7 days)       Procedure Component Value Units Date/Time    Culture, Respiratory with Gram Stain [[id number]] Collected: 03/17/25 0753    Order Status: Sent Specimen: Respiratory from Sputum, Expectorated Updated: 03/17/25 0803    Blood culture x two cultures. Draw prior to antibiotics [[id number]] Collected: 03/13/25 2346    Order Status: Completed Specimen: Blood from Peripheral, Hand, Right Updated: 03/17/25 0232     Blood Culture, Routine No Growth to date      No Growth to date      No Growth to date      No Growth to date    Narrative:      Aerobic and anaerobic    Blood culture x two cultures. Draw prior to antibiotics [[id number]] Collected: 03/14/25 0642    Order Status: Completed Specimen: Blood Updated: 03/16/25 0832     Blood Culture, Routine No Growth to date      No Growth to date      No Growth to date    Narrative:      Aerobic and  anaerobic  Collection has been rescheduled by [facility] at 03/14/2025 00:30 Reason:   Unable to collect 2nd set. Nurse Notified  Collection has been rescheduled by [facility] at 03/14/2025 00:30 Reason:   Unable to collect 2nd set. Nurse Notified    MRSA Screen by PCR [[id number]] Collected: 03/14/25 1343    Order Status: Completed Specimen: Nasal Swab Updated: 03/14/25 1651     MRSA SCREEN BY PCR Not Detected    Respiratory Infection Panel (PCR), Nasopharyngeal [[id number]] Collected: 03/14/25 0902    Order Status: Completed Specimen: Nasopharyngeal Swab Updated: 03/14/25 1103     Respiratory Infection Panel Source NP swab     Adenovirus Not Detected     Coronavirus 229E, Common Cold Virus Not Detected     Coronavirus HKU1, Common Cold Virus Not Detected     Coronavirus NL63, Common Cold Virus Not Detected     Coronavirus OC43, Common Cold Virus Not Detected     Comment: Coronavirus strains 229E, HKU1, NL63, and OC43 can cause the common   cold   and are not associated with the respiratory disease outbreak caused   by  the COVID-19 (SARS-CoV-2 novel Coronavirus) strain.           SARS-CoV2 (COVID-19) Qualitative PCR Not Detected     Human Metapneumovirus Not Detected     Human Rhinovirus/Enterovirus Not Detected     Influenza A Not Detected     Influenza B Not Detected     Parainfluenza Virus 1 Not Detected     Parainfluenza Virus 2 Not Detected     Parainfluenza Virus 3 Not Detected     Parainfluenza Virus 4 Not Detected     Respiratory Syncytial Virus Not Detected     Bordetella Parapertussis (IB7756) Not Detected     Bordetella pertussis (ptxP) Not Detected     Chlamydia pneumoniae Not Detected     Mycoplasma pneumoniae Not Detected     Comment: Respiratory Infection Panel testing performed by Multiplex PCR.       Narrative:      Respiratory Infection Panel source->NP Swab          Significant Imaging:   CT abdomen and Pelvis with IV contrast:  1.  Moderate to severe colonic wall thickening in the ascending and rectosigmoid  colon, with mild to moderate colonic wall thickening in the remainder of the colon compatible with pan-colitis (likely infectious/inflammatory, and or neutropenic, and less likely from vasculitis, ischemia or lymphoma), consider correlation with the symptoms, history and attention on follow-up imaging.  2.  Peg tube with balloon inflated in the body of the stomach.  No finding of bowel obstruction, intra-abdominal free air or leak.  3.  Numerous additional, and incidental findings as above.    ECHO:    Left Ventricle: The left ventricle is normal in size. Normal wall thickness. There is normal systolic function with a visually estimated ejection fraction of 65 - 70%. There is normal diastolic function.    Right Ventricle: The right ventricle is normal in size. Systolic function is normal.    IVC/SVC: Normal venous pressure at 3 mmHg.    CXR:  Small bilateral pleural effusions with adjacent airspace disease or atelectasis, left greater than right. Background of chronic interstitial changes. Normal heart size.     CTA Chest:  1. No evidence of pulmonary embolism to the segmental arterial level. If there is continued clinical concern, consider further evaluation with lower extremity doppler.  2. Mild bronchiectasis with a central hilar lower lobe predominance.  3.  Minimal scattered punctate nodules present, partially obscured by motion artifact suggesting either minimal infection, atelectasis or scarring.  The largest measurable nodule is 4 mm in diameter, and is unchanged from the previous CT, for which no additional follow-up will be warranted.

## 2025-03-17 NOTE — ASSESSMENT & PLAN NOTE
"This patient does have evidence of infective focus  My overall impression is sepsis.  Source: Respiratory  Antibiotics given-   Antibiotics (72h ago, onward)      Start     Stop Route Frequency Ordered    03/16/25 0945  metronidazole IVPB 500 mg         -- IV Every 8 hours (non-standard times) 03/16/25 0842    03/14/25 0915  ceFEPIme injection 2 g         -- IV Every 8 hours (non-standard times) 03/14/25 0803          Latest lactate reviewed-  No results for input(s): "LACTATE", "POCLAC" in the last 72 hours.    Organ dysfunction indicated by Acute respiratory failure    Fluid challenge we will give fluids    Post- resuscitation assessment Yes - I attest a sepsis perfusion exam was performed within 6 hours of sepsis, severe sepsis, or septic shock presentation, following fluid resuscitation.      Will Not start Pressors- Levophed for MAP of 65  Source control achieved by:     MRSA negative.   Discontinue vancomycin.   CTA of the chest reviewed, no significant pneumonia.  UA negative.   Get CTA abdomen and pelvis to rule out intra-abdominal infectious source.   Continue cefepime and azithromycin.   Continue low rate IV hydration  "

## 2025-03-17 NOTE — PT/OT/SLP EVAL
Occupational Therapy   Evaluation, tx    Name: Reji Romano  MRN: [id number]  Admitting Diagnosis: Acute on chronic respiratory failure with hypoxia  Recent Surgery: * No surgery found *    The primary encounter diagnosis was COPD exacerbation. Diagnoses of Sepsis, due to unspecified organism, unspecified whether acute organ dysfunction present, Hypotension, unspecified hypotension type, Lactic acidosis, Acute on chronic respiratory failure with hypoxia, Shortness of breath, Elevated troponin, and Poor venous access were also pertinent to this visit.    Recommendations:     Discharge Recommendations: Low Intensity Therapy (wife and pt declining HHOT)  Discharge Equipment Recommendations:  none  Barriers to discharge:   (O2 desaturation w/ act.)    Assessment:     Reji Romano is a 70 y.o. male with a medical diagnosis of Acute on chronic respiratory failure with hypoxia.  He presents with deconditioning. Performance deficits affecting function: weakness, impaired endurance, impaired self care skills, impaired functional mobility, gait instability, decreased upper extremity function, decreased lower extremity function, impaired cardiopulmonary response to activity.      Pt w/ hx of laryngeal ca, s/p radiation and chemo. He has chronic debility as a result per wife's report.  Pt on 2l home O2 24/7. Has hx PEG tube and dysphagia. He presents to OT w/ poor activity tolerance which limited self care activity participation. Pt exhibited increased SOB, quick fatigue and O2 desaturation to 72% on 2L w/ minimal activity. Pt recovered fairly quickly w/ frequent rest breaks. RT noted upon entrance to room. Pt would benefit from continued OT. Wife and pt declining HHOT.     Rehab Prognosis: Fair; patient would benefit from acute skilled OT services to address these deficits and reach maximum level of function.       Plan:     Patient to be seen 3 x/week to address the above listed problems via self-care/home management,  therapeutic activities, therapeutic exercises  Plan of Care Expires: 04/17/25  Plan of Care Reviewed with: patient, spouse    Subjective     Chief Complaint: none  Patient/Family Comments/goals: per spouse, he just got back to bed, he is tired.    Occupational Profile:  Living Environment: pt lives with spouse in a SSH with ramp; pt sponge bathes. Sleeps in recliner.   Previous level of function: Indep g/hygiene, ambulates short w/ rollator to bathroom, toilets self; spouse helps w/ sponge bathing from bed an dressing tasks. Spouse does all meal prep, household chores and provide transportation.  Roles and Routines: chronically ill from [place], sedentary.   Equipment Used at Home: oxygen, walker, rolling, rollator, wheelchair  Assistance upon Discharge: spouse    Pain/Comfort:  Pain Rating 1: 0/10  Pain Rating Post-Intervention 1: 0/10    Patients cultural, spiritual, Pentecostal conflicts given the current situation: no    Objective:     Communicated with: nurse prior to session.  Patient found HOB elevated with bed alarm, peripheral IV, telemetry, blood pressure cuff, pulse ox (continuous), oxygen, PEG Tube upon OT entry to room.    General Precautions: Standard, fall, respiratory  Orthopedic Precautions: N/A  Braces: N/A  Respiratory Status: Nasal cannula, flow 2 L/min    Occupational Performance:    Activities of Daily Living:  Feeding:  dependence NPO, PEG tube  Grooming: stand by assistance for oral hygiene w/ swab; vc to take rest breaks due to O2 desaturation.     Cognitive/Visual Perceptual:  Cognitive/Psychosocial Skills:     -       Oriented to: Person, Place, and month not year   -       Follows Commands/attention:Follows one-step commands  -       Communication: dysarthria  -       Memory: NT  -       Safety awareness/insight to disability: impaired   -       Mood/Affect/Coping skills/emotional control: Cooperative and Flat affect  Visual/Perceptual:      -Intact grossly    Physical Exam:  Balance:    -        NT due to fatigue and SOB  Postural examination/scapula alignment:    -       NT  Skin integrity: Dry and PEG  Dominant hand:    -       right  Upper Extremity Range of Motion:     -       Right Upper Extremity: WFL  -       Left Upper Extremity: WFL  Upper Extremity Strength:    -       Right Upper Extremity: WFL except shld 3+/5  -       Left Upper Extremity: WFL except shld 3+/5   Strength:    -       Right Upper Extremity: WFL  -       Left Upper Extremity: WFL    AMPAC 6 Click ADL:  AMPAC Total Score: 13    Treatment & Education:  Purpose of OT and POC   Educated pt and spouse on energy conservation tech; slow pacing and PLB, pt unable to return demonstration of PLB tech.  All questions/.concerns addressed with scope.      Patient left HOB elevated with all lines intact, call button in reach, bed alarm on, and spouse present    GOALS:   Multidisciplinary Problems       Occupational Therapy Goals          Problem: Occupational Therapy    Goal Priority Disciplines Outcome Interventions   Occupational Therapy Goal     OT, PT/OT Not Progressing    Description: Goals to be met by: 4/17/2025     Patient will increase functional independence with ADLs by performing:    UE Dressing with Stand-by Assistance.  LE Dressing with Minimal Assistance.  Grooming while seated with Modified [place].  Toileting from toilet with Stand-by Assistance for hygiene and clothing management.   Toilet transfer to toilet with Stand-by Assistance.  Upper extremity exercise program x10 reps per handout, with independence.                         DME Justifications:  No DME recommended requiring DME justifications    History:     History reviewed. No pertinent past medical history.    History reviewed. No pertinent surgical history.    Time Tracking:     OT Date of Treatment: 03/17/25  OT Start Time: 1130  OT Stop Time: 1146  OT Total Time (min): 16 min    Billable Minutes:Evaluation 8  Self Care/Home Management 8  Total Time  16    3/17/2025

## 2025-03-17 NOTE — PT/OT/SLP EVAL
Physical Therapy Evaluation    Patient Name:  Reji Romano   MRN:  [id number]    Recommendations:     Discharge Recommendations: No Therapy Indicated   Discharge Equipment Recommendations: none   Barriers to discharge:  medical status    Assessment:     Reji Romano is a 70 y.o. male admitted with a medical diagnosis of Acute on chronic respiratory failure with hypoxia.  He presents with the following impairments/functional limitations: weakness, impaired endurance, impaired self care skills, impaired functional mobility, gait instability, impaired balance, decreased lower extremity function, decreased safety awareness, pain, impaired cardiopulmonary response to activity.    Pt found in bed with HOB elevated. Caregiver at bedside. Pt agreeable to visit. Pt requested to use commode which caregiver assisted with CGA for transfers and dependent for hygiene care. Mild shortness of breath but SAO2 WFL on 2L O2 via NC. After commode use patient returned to EOB and patient agreeable to up in chair. Pt requires CGA bed to chair with no loss of balance and mild shortness of breath. Poor waves length unable to determine accurate SAO2 post transfer but no over s/s of low SAO2.    Rehab Prognosis: Fair; patient would benefit from acute skilled PT services to address these deficits and reach maximum level of function.    Recent Surgery: * No surgery found *      Plan:     During this hospitalization, patient to be seen 3 x/week to address the identified rehab impairments via gait training, therapeutic activities, therapeutic exercises, neuromuscular re-education and progress toward the following goals:    Plan of Care Expires:  04/17/25    Subjective     Chief Complaint: want to get up  Patient/Family Comments/goals: up in chair  Pain/Comfort:  Pain Rating 1: 0/10    Patients cultural, spiritual, Baptist conflicts given the current situation: no    Living Environment:  Pt lives with his spouse in a one story home with ramp  access. Pt is room bound but able to walk short distance around bedroom and to his bathroom with RW  Prior to admission, patients level of function was supervision RW.  Equipment used at home: oxygen, wheelchair, walker, rolling.  DME owned (not currently used): none.  Upon discharge, patient will have assistance from spouse.    Objective:     Communicated with RN prior to session.  Patient found HOB elevated with peripheral IV, telemetry, blood pressure cuff, pulse ox (continuous), oxygen, PEG Tube  upon PT entry to room.    General Precautions: Standard, fall  Orthopedic Precautions:N/A   Braces: N/A  Respiratory Status: Nasal cannula, flow 2 L/min    Exams:  RLE ROM: WFL  RLE Strength: WFL  LLE ROM: WFL  LLE Strength: WFL    Functional Mobility:  Bed Mobility:     Supine to Sit: contact guard assistance  Transfers:     Sit to Stand:  contact guard assistance with no AD  Bed to Chair: contact guard assistance with  no AD  using  Stand Pivot      AM-PAC 6 CLICK MOBILITY  Total Score:17       Treatment & Education:  Pt educated on POC, discharge recommendation, importance of time OOB, pursed lip breathing, need for assist with mobility, use of call bell to seek assistance as needed and fall prevention      Patient left up in chair with all lines intact, call button in reach, chair alarm on, and wife present.    GOALS:   Multidisciplinary Problems       Physical Therapy Goals          Problem: Physical Therapy    Goal Priority Disciplines Outcome Interventions   Physical Therapy Goal     PT, PT/OT Progressing    Description: Goals to be met by: 25     Patient will increase functional independence with mobility by performin. Supine to sit with Independent  2. Sit to stand transfer with Supervision  3. Bed to chair transfer with Supervision using Rolling Walker  4. Gait  x 25 feet with Supervision using Rolling Walker.                              DME Justifications:  No DME recommended requiring DME  justifications    History:     History reviewed. No pertinent past medical history.    History reviewed. No pertinent surgical history.    Time Tracking:     PT Received On: 03/17/25  PT Start Time: 1020     PT Stop Time: 1037  PT Total Time (min): 17 min     Billable Minutes: Evaluation 9 and Therapeutic Activity 8      03/17/2025

## 2025-03-17 NOTE — ASSESSMENT & PLAN NOTE
Patient's COPD is with exacerbation noted by continued dyspnea and use of accessory muscles for breathing currently.  Patient is currently on COPD Pathway. Continue scheduled inhalers Steroids, Antibiotics, and Supplemental oxygen and monitor respiratory status closely.       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   On IV solumedrol 40 q 8 hrs.

## 2025-03-17 NOTE — ASSESSMENT & PLAN NOTE
Patient with Hypoxic Respiratory failure which is Acute on chronic.  he is on home oxygen at 2 LPM. Supplemental oxygen was provided and noted- Oxygen Concentration (%):  [28] 28  Signs/symptoms of respiratory failure include- increased work of breathing, respiratory distress, and use of accessory muscles. Contributing diagnoses includes - COPD and Pneumonia Labs and images were reviewed. Will treat underlying causes and adjust management of respiratory failure as follows  Antibiotics and steroids

## 2025-03-17 NOTE — PLAN OF CARE
Problem: Occupational Therapy  Goal: Occupational Therapy Goal  Description: Goals to be met by: 4/17/2025     Patient will increase functional independence with ADLs by performing:    UE Dressing with Stand-by Assistance.  LE Dressing with Minimal Assistance.  Grooming while seated with Modified [place].  Toileting from toilet with Stand-by Assistance for hygiene and clothing management.   Toilet transfer to toilet with Stand-by Assistance.  Upper extremity exercise program x10 reps per handout, with independence.    Outcome: Not Progressing   Due to O2 desaturation to 72% on 2L w/  minimal act. Pt recovered fairly quickly w/ rest break. RT noted.

## 2025-03-18 LAB
ALBUMIN SERPL BCP-MCNC: 2.6 G/DL (ref 3.5–5.2)
ALP SERPL-CCNC: 81 U/L (ref 55–135)
ALT SERPL W/O P-5'-P-CCNC: 26 U/L (ref 10–44)
ANION GAP SERPL CALC-SCNC: 7 MMOL/L (ref 8–16)
AST SERPL-CCNC: 18 U/L (ref 10–40)
BASOPHILS # BLD AUTO: 0.01 K/UL (ref 0–0.2)
BASOPHILS NFR BLD: 0 % (ref 0–1.9)
BILIRUB SERPL-MCNC: 0.5 MG/DL (ref 0.1–1)
BUN SERPL-MCNC: 37 MG/DL (ref 8–23)
CALCIUM SERPL-MCNC: 8.4 MG/DL (ref 8.7–10.5)
CHLORIDE SERPL-SCNC: 106 MMOL/L (ref 95–110)
CO2 SERPL-SCNC: 33 MMOL/L (ref 23–29)
CREAT SERPL-MCNC: 0.8 MG/DL (ref 0.5–1.4)
DIFFERENTIAL METHOD BLD: ABNORMAL
EOSINOPHIL # BLD AUTO: 0 K/UL (ref 0–0.5)
EOSINOPHIL NFR BLD: 0 % (ref 0–8)
ERYTHROCYTE [DISTWIDTH] IN BLOOD BY AUTOMATED COUNT: 14.6 % (ref 11.5–14.5)
EST. GFR  (NO RACE VARIABLE): >60 ML/MIN/1.73 M^2
GLUCOSE SERPL-MCNC: 133 MG/DL (ref 70–110)
HCT VFR BLD AUTO: 49.7 % (ref 40–54)
HGB BLD-MCNC: 15.1 G/DL (ref 14–18)
IMM GRANULOCYTES # BLD AUTO: 0.89 K/UL (ref 0–0.04)
IMM GRANULOCYTES NFR BLD AUTO: 2.3 % (ref 0–0.5)
LYMPHOCYTES # BLD AUTO: 0.9 K/UL (ref 1–4.8)
LYMPHOCYTES NFR BLD: 2.4 % (ref 18–48)
MAGNESIUM SERPL-MCNC: 2.3 MG/DL (ref 1.6–2.6)
MCH RBC QN AUTO: 28.8 PG (ref 27–31)
MCHC RBC AUTO-ENTMCNC: 30.4 G/DL (ref 32–36)
MCV RBC AUTO: 95 FL (ref 82–98)
MONOCYTES # BLD AUTO: 1 K/UL (ref 0.3–1)
MONOCYTES NFR BLD: 2.7 % (ref 4–15)
NEUTROPHILS # BLD AUTO: 35.3 K/UL (ref 1.8–7.7)
NEUTROPHILS NFR BLD: 92.6 % (ref 38–73)
NRBC BLD-RTO: 0 /100 WBC
PHOSPHATE SERPL-MCNC: 3 MG/DL (ref 2.7–4.5)
PLATELET # BLD AUTO: 229 K/UL (ref 150–450)
PLATELET BLD QL SMEAR: ABNORMAL
PMV BLD AUTO: 11.2 FL (ref 9.2–12.9)
POTASSIUM SERPL-SCNC: 4.1 MMOL/L (ref 3.5–5.1)
PROT SERPL-MCNC: 5.1 G/DL (ref 6–8.4)
RBC # BLD AUTO: 5.25 M/UL (ref 4.6–6.2)
SODIUM SERPL-SCNC: 146 MMOL/L (ref 136–145)
TOXIC GRANULES BLD QL SMEAR: PRESENT
WBC # BLD AUTO: 38.13 K/UL (ref 3.9–12.7)

## 2025-03-18 PROCEDURE — 99900035 HC TECH TIME PER 15 MIN (STAT)

## 2025-03-18 PROCEDURE — 94668 MNPJ CHEST WALL SBSQ: CPT

## 2025-03-18 PROCEDURE — 99223 1ST HOSP IP/OBS HIGH 75: CPT | Mod: 25,,, | Performed by: INTERNAL MEDICINE

## 2025-03-18 PROCEDURE — 25000242 PHARM REV CODE 250 ALT 637 W/ HCPCS: Performed by: HOSPITALIST

## 2025-03-18 PROCEDURE — 94640 AIRWAY INHALATION TREATMENT: CPT

## 2025-03-18 PROCEDURE — 21000000 HC CCU ICU ROOM CHARGE

## 2025-03-18 PROCEDURE — 63600175 PHARM REV CODE 636 W HCPCS: Performed by: HOSPITALIST

## 2025-03-18 PROCEDURE — 97530 THERAPEUTIC ACTIVITIES: CPT

## 2025-03-18 PROCEDURE — 83735 ASSAY OF MAGNESIUM: CPT | Performed by: HOSPITALIST

## 2025-03-18 PROCEDURE — 25000003 PHARM REV CODE 250: Performed by: HOSPITALIST

## 2025-03-18 PROCEDURE — 63600175 PHARM REV CODE 636 W HCPCS: Mod: JZ,TB | Performed by: FAMILY MEDICINE

## 2025-03-18 PROCEDURE — 99233 SBSQ HOSP IP/OBS HIGH 50: CPT | Mod: ,,, | Performed by: INTERNAL MEDICINE

## 2025-03-18 PROCEDURE — 85025 COMPLETE CBC W/AUTO DIFF WBC: CPT | Performed by: HOSPITALIST

## 2025-03-18 PROCEDURE — 99497 ADVNCD CARE PLAN 30 MIN: CPT | Mod: 25,,, | Performed by: INTERNAL MEDICINE

## 2025-03-18 PROCEDURE — 94761 N-INVAS EAR/PLS OXIMETRY MLT: CPT

## 2025-03-18 PROCEDURE — 27100171 HC OXYGEN HIGH FLOW UP TO 24 HOURS

## 2025-03-18 PROCEDURE — 84100 ASSAY OF PHOSPHORUS: CPT | Performed by: HOSPITALIST

## 2025-03-18 PROCEDURE — 99900031 HC PATIENT EDUCATION (STAT)

## 2025-03-18 PROCEDURE — 36415 COLL VENOUS BLD VENIPUNCTURE: CPT | Performed by: HOSPITALIST

## 2025-03-18 PROCEDURE — 80053 COMPREHEN METABOLIC PANEL: CPT | Performed by: HOSPITALIST

## 2025-03-18 RX ADMIN — METHYLPREDNISOLONE SODIUM SUCCINATE 40 MG: 40 INJECTION, POWDER, FOR SOLUTION INTRAMUSCULAR; INTRAVENOUS at 05:03

## 2025-03-18 RX ADMIN — AMITRIPTYLINE HYDROCHLORIDE 50 MG: 25 TABLET, FILM COATED ORAL at 09:03

## 2025-03-18 RX ADMIN — METHYLPREDNISOLONE SODIUM SUCCINATE 40 MG: 40 INJECTION, POWDER, FOR SOLUTION INTRAMUSCULAR; INTRAVENOUS at 02:03

## 2025-03-18 RX ADMIN — SODIUM CHLORIDE SOLN NEBU 3% 4 ML: 3 NEBU SOLN at 08:03

## 2025-03-18 RX ADMIN — METHYLPREDNISOLONE SODIUM SUCCINATE 40 MG: 40 INJECTION, POWDER, FOR SOLUTION INTRAMUSCULAR; INTRAVENOUS at 09:03

## 2025-03-18 RX ADMIN — ROFLUMILAST 500 MCG: 500 TABLET ORAL at 07:03

## 2025-03-18 RX ADMIN — METRONIDAZOLE 500 MG: 500 INJECTION, SOLUTION INTRAVENOUS at 05:03

## 2025-03-18 RX ADMIN — OXYCODONE HYDROCHLORIDE 20 MG: 10 TABLET ORAL at 10:03

## 2025-03-18 RX ADMIN — METRONIDAZOLE 500 MG: 500 INJECTION, SOLUTION INTRAVENOUS at 03:03

## 2025-03-18 RX ADMIN — OXYCODONE HYDROCHLORIDE 20 MG: 10 TABLET ORAL at 02:03

## 2025-03-18 RX ADMIN — IPRATROPIUM BROMIDE AND ALBUTEROL SULFATE 3 ML: .5; 3 SOLUTION RESPIRATORY (INHALATION) at 07:03

## 2025-03-18 RX ADMIN — OXYCODONE HYDROCHLORIDE 20 MG: 10 TABLET ORAL at 07:03

## 2025-03-18 RX ADMIN — LEVOTHYROXINE SODIUM 125 MCG: 0.1 TABLET ORAL at 09:03

## 2025-03-18 RX ADMIN — CEFEPIME 2 G: 2 INJECTION, POWDER, FOR SOLUTION INTRAVENOUS at 02:03

## 2025-03-18 RX ADMIN — ARFORMOTEROL TARTRATE 15 MCG: 15 SOLUTION RESPIRATORY (INHALATION) at 07:03

## 2025-03-18 RX ADMIN — BUDESONIDE INHALATION 1 MG: 0.5 SUSPENSION RESPIRATORY (INHALATION) at 07:03

## 2025-03-18 RX ADMIN — CEFEPIME 2 G: 2 INJECTION, POWDER, FOR SOLUTION INTRAVENOUS at 09:03

## 2025-03-18 RX ADMIN — IPRATROPIUM BROMIDE AND ALBUTEROL SULFATE 3 ML: .5; 3 SOLUTION RESPIRATORY (INHALATION) at 11:03

## 2025-03-18 RX ADMIN — SODIUM CHLORIDE SOLN NEBU 3% 4 ML: 3 NEBU SOLN at 07:03

## 2025-03-18 RX ADMIN — ENOXAPARIN SODIUM 40 MG: 40 INJECTION SUBCUTANEOUS at 04:03

## 2025-03-18 RX ADMIN — CEFEPIME 2 G: 2 INJECTION, POWDER, FOR SOLUTION INTRAVENOUS at 05:03

## 2025-03-18 NOTE — ASSESSMENT & PLAN NOTE
"This patient does have evidence of infective focus  My overall impression is sepsis.  Source: Respiratory  Antibiotics given-   Antibiotics (72h ago, onward)      Start     Stop Route Frequency Ordered    03/16/25 0945  metronidazole IVPB 500 mg         -- IV Every 8 hours (non-standard times) 03/16/25 0842    03/14/25 0915  ceFEPIme injection 2 g         -- IV Every 8 hours (non-standard times) 03/14/25 0803          Latest lactate reviewed-  No results for input(s): "LACTATE", "POCLAC" in the last 72 hours.    Organ dysfunction indicated by Acute respiratory failure    Fluid challenge we will give fluids    Post- resuscitation assessment Yes - I attest a sepsis perfusion exam was performed within 6 hours of sepsis, severe sepsis, or septic shock presentation, following fluid resuscitation.      Will Not start Pressors- Levophed for MAP of 65  Source control achieved by:     MRSA negative.   Discontinue vancomycin.   CTA of the chest reviewed, no significant pneumonia.  UA negative.   CT abdomen and pelvis results reviewed.  Continue cefepime and   Metronidazole  "

## 2025-03-18 NOTE — CARE UPDATE
03/17/25 1929   Patient Assessment/Suction   Level of Consciousness (AVPU) responds to voice   Respiratory Effort Mild;Shallow   Expansion/Accessory Muscles/Retractions abdominal muscle use   All Lung Fields Breath Sounds coarse   Rhythm/Pattern, Respiratory pattern regular;labored   Cough Frequency no cough   Skin Integrity   $ Wound Care Tech Time 15 min   Area Observed Left;Right;Cheek;Bridge of nose   Skin Appearance without discoloration   PRE-TX-O2   Device (Oxygen Therapy) BIPAP   $ Is the patient on High Flow Oxygen? Yes   Oxygen Concentration (%) 30   SpO2 95 %   Pulse Oximetry Type Continuous   $ Pulse Oximetry - Multiple Charge Pulse Oximetry - Multiple   Pulse (!) 118   Resp 20   Positioning Right side   Positioning   Head of Bed (HOB) Positioning HOB at 30-45 degrees   Positioning/Transfer Devices pillows;in use   Aerosol Therapy   $ Aerosol Therapy Charges Aerosol Treatment   Treatment Route (SVN) oxygen;PAP device   Patient Position HOB elevated   Post Treatment Assessment (SVN) increased aeration   Signs of Intolerance (SVN) none   Vibratory PEP Therapy   $ Vibratory PEP Charges   (pt was on Bipap unable to perform)   Chest Physiotherapy   $ Chest Physiotherapy Charges Refused   High Frequency Chest Compression Vest   $ Chest Compression Charges Refused   Ready to Wean/Extubation Screen   FIO2<=50 (chart decimal) 0.3   Preset CPAP/BiPAP Settings   Mode Of Delivery BiPAP   $ CPAP/BiPAP Daily Charge 1   CPAP/BIPAP charged w/in last 24 h YES   $ Initial CPAP/BiPAP Setup? Yes   $ Is patient using? Yes   Size of Mask Small/Medium   Sized Appropriately? Yes   Equipment Type V60   Airway Device Type small full face mask   Humidifier not applicable   Ipap 10   EPAP (cm H2O) 5   Pressure Support (cm H2O) 5   Set Rate (Breaths/Min) 16   ITime (sec) 0.9   Rise Time (sec) 3   Patient CPAP/BiPAP Settings   CPAP/BIPAP ID na   Timed Inspiration (Sec) 0.9   IPAP Rise Time (sec) 3   RR Total (Breaths/Min) 20    Tidal Volume (mL) 390   VE Minute Ventilation (L/min) 8 L/min   Peak Inspiratory Pressure (cm H2O) 10   TiTOT (%) 24   Total Leak (L/Min) 76   Patient Trigger - ST Mode Only (%) 71   CPAP/BiPAP Backup Settings   Backup Rate 16 breaths per minute (bpm)   Education   $ Education BiPAP;Bronchodilator;Oxygen;15 min

## 2025-03-18 NOTE — PLAN OF CARE
Pt continues to require inpatient medical treatment at this time. Pt was cleared to discharge with home health services yesterday when SOB was experienced with distress noted. Pt is now requiring Bipap almost continuously to maintain sats. Palliative has been consulted and may change pts plan of care. Case management following closely for needs. Current plan is home with [facility], spouse to provide transport.      03/18/25 2307   Discharge Reassessment   Assessment Type Discharge Planning Reassessment   Did the patient's condition or plan change since previous assessment? Yes   Discharge Plan discussed with: Patient;Spouse/sig other   Communicated JENNA with patient/caregiver Yes   Discharge Plan A Home Health   Why the patient remains in the hospital Requires continued medical care

## 2025-03-18 NOTE — PLAN OF CARE
Problem: Occupational Therapy  Goal: Occupational Therapy Goal  Description: Goals to be met by: 4/17/2025     Patient will increase functional independence with ADLs by performing:    UE Dressing with Stand-by Assistance.  LE Dressing with Minimal Assistance.  Grooming while seated with Modified [place].  Toileting from toilet with Stand-by Assistance for hygiene and clothing management.   Toilet transfer to toilet with Stand-by Assistance.  Upper extremity exercise program x10 reps per handout, with independence.    Outcome: Progressing

## 2025-03-18 NOTE — PROGRESS NOTES
Pulmonary/Critical Care  Progress Note      Patient name: Reji Romano  MRN: [id number]  Date: 03/18/2025    Admit Date: 3/13/2025  Consult Requested By: Zoey Copeland MD    Reason for Consult: REspiratory failure, COPD    HPI:    3/17/2025 - Pt has been admitted fr a few days and was tentatively going home today when he developed an acute decompensation.  I was called to see him.  CXR looked OK and ABG was OK but he had increased WOB.  When I arrived he still has increased WOB biut will settle down at times.  He goes from not responding to questions to answering (though voice quality is not good).  He has COPD (I suspect very severe, wife says at one point there was talk about transplant), h/o head and neck cancer, s/p surgery, XRT (she tells me no active disease), prior trach (she says related to respiratory failure with sepsis), severe malnutrition.  I discussed at length with them about code status and at this time he wants to be a full code - he does not want to live prolonged on a ventilator (I did discuss that if he ends up on a ventilator that extubation would be very difficult).  We also discussed trying BiPAP and he is willing to try.  He also has chronic pain issues and I feel taht we need to continue his medications.      3/18/2025 - He is doing better this AM and has been able to use the BiPAP some, no new issues but he remains very tenuous and could decompensate at any time.  WBC increased, SC with GNR    Review of Systems    Review of Systems   Constitutional:  Positive for malaise/fatigue and weight loss. Negative for chills, diaphoresis and fever.   HENT:  Negative for congestion.    Eyes:  Negative for pain.   Respiratory:  Positive for cough, sputum production (trouble clearing airway) and shortness of breath. Negative for hemoptysis, wheezing and stridor.    Cardiovascular:  Negative for chest pain, palpitations, orthopnea, claudication, leg swelling and PND.   Gastrointestinal:  Positive for  nausea. Negative for abdominal pain, constipation, diarrhea, heartburn and vomiting.        PEG   Genitourinary:  Negative for dysuria, frequency and urgency.   Musculoskeletal:  Negative for falls and myalgias.   Neurological:  Positive for weakness. Negative for sensory change and focal weakness.   Psychiatric/Behavioral:  Negative for depression. The patient is not nervous/anxious.        Past Medical History    History reviewed. No pertinent past medical history.    Past Surgical History    History reviewed. No pertinent surgical history.    Medications (scheduled):      albuterol-ipratropium  3 mL Nebulization Q4H WAKE    amitriptyline  50 mg Oral QHS    arformoteroL  15 mcg Nebulization BID    budesonide  1 mg Nebulization Q12H    ceFEPime IV (PEDS and ADULTS)  2 g Intravenous Q8H    enoxparin  40 mg Subcutaneous Daily    levothyroxine  125 mcg Oral QHS    methylPREDNISolone injection (PEDS and ADULTS)  40 mg Intravenous Q8H    metroNIDAZOLE IV (PEDS and ADULTS)  500 mg Intravenous Q8H    roflumilast  500 mcg Oral Daily    sodium chloride 3%  4 mL Nebulization BID       Medications (infusions):         Medications (prn):       Current Facility-Administered Medications:     hydrOXYzine HCL, 25 mg, Oral, BID PRN    morphine, 2 mg, Intravenous, Q4H PRN    ondansetron, 4 mg, Intravenous, Q12H PRN    oxyCODONE, 20 mg, Oral, TID PRN    Flushing PICC/Midline Protocol, , , Until Discontinued **AND** sodium chloride 0.9%, 10 mL, Intravenous, Q12H PRN    sodium chloride 0.9%, 3 mL, Intravenous, PRN    Family History: No family history on file.    Social History: Tobacco: Tobacco Use History[1]                             EtOH:   Social History     Substance and Sexual Activity   Alcohol Use Not Currently                                Drugs:   Social History     Substance and Sexual Activity   Drug Use Not Currently     Physical Exam    Vital signs:  Temp:  [96.8 °F (36 °C)-97.4 °F (36.3 °C)]   Pulse:  []   Resp:   "[18-48]   BP: ()/(65-93)   SpO2:  [70 %-98 %]     Intake/Output:   Intake/Output Summary (Last 24 hours) at 3/18/2025 1536  Last data filed at 3/18/2025 1300  Gross per 24 hour   Intake 1887.84 ml   Output 600 ml   Net 1287.84 ml        BMI: Estimated body mass index is 17.98 kg/m² as calculated from the following:    Height as of this encounter: 5' 5" (1.651 m).    Weight as of this encounter: 49 kg (108 lb 0.4 oz).    Physical Exam  Vitals and nursing note reviewed.   Constitutional:       General: He is in acute distress.      Appearance: Normal appearance. He is ill-appearing. He is not toxic-appearing or diaphoretic.      Comments: Thin, frail  Wearing BiPAP   HENT:      Head: Normocephalic and atraumatic.      Comments: Prior surgery (wife says he has titanium in his jaw)     Right Ear: External ear normal.      Left Ear: External ear normal.      Nose: Nose normal.      Mouth/Throat:      Mouth: Mucous membranes are moist.      Pharynx: Oropharynx is clear. No oropharyngeal exudate.   Eyes:      General: No scleral icterus.        Right eye: No discharge.         Left eye: No discharge.      Extraocular Movements: Extraocular movements intact.      Conjunctiva/sclera: Conjunctivae normal.      Pupils: Pupils are equal, round, and reactive to light.   Neck:      Vascular: No carotid bruit.      Comments: Prior trach site  Very firm neck tissue from treatment  Decreased mobility  Cardiovascular:      Rate and Rhythm: Normal rate and regular rhythm.      Pulses: Normal pulses.      Heart sounds: Normal heart sounds. No murmur heard.     No friction rub. No gallop.   Pulmonary:      Effort: Respiratory distress present.      Breath sounds: No stridor. Rhonchi present. No wheezing or rales.      Comments: Decreased BS throughout  Intermittent acc m use  Chest:      Chest wall: No tenderness.   Abdominal:      General: Bowel sounds are normal. There is no distension.      Tenderness: There is no abdominal " "tenderness. There is no guarding.      Comments: PEG   Musculoskeletal:         General: No swelling. Normal range of motion.      Cervical back: Normal range of motion and neck supple. No rigidity or tenderness.      Right lower leg: No edema.      Left lower leg: No edema.      Comments: + surgical scars  + muscle wasting   Lymphadenopathy:      Cervical: No cervical adenopathy.   Skin:     General: Skin is warm and dry.      Capillary Refill: Capillary refill takes less than 2 seconds.      Coloration: Skin is not jaundiced.      Findings: No bruising.   Neurological:      General: No focal deficit present.      Mental Status: He is alert. Mental status is at baseline.      Cranial Nerves: No cranial nerve deficit.      Sensory: No sensory deficit.      Motor: Weakness present.      Comments: Waxing and waning of LOC  Difficulty understanding when he vocalizes   Psychiatric:      Comments: Anxious          Laboratory    Recent Labs   Lab 03/18/25  0630   WBC 38.13*   RBC 5.25   HGB 15.1   HCT 49.7      MCV 95   MCH 28.8   MCHC 30.4*       Recent Labs   Lab 03/18/25  0942   CALCIUM 8.4*   ALBUMIN 2.6*   PROT 5.1*   *   K 4.1   CO2 33*      BUN 37*   CREATININE 0.8   ALKPHOS 81   ALT 26   AST 18   BILITOT 0.5       No results for input(s): "PT", "INR", "APTT" in the last 24 hours.    No results for input(s): "CPK", "CPKMB", "TROPONINI", "MB" in the last 24 hours.    Additional labs: reviewed    Microbiology:       Microbiology Results (last 7 days)       Procedure Component Value Units Date/Time    Blood culture x two cultures. Draw prior to antibiotics [[id number]] Collected: 03/14/25 0653    Order Status: Completed Specimen: Blood Updated: 03/18/25 0832     Blood Culture, Routine No Growth to date      No Growth to date      No Growth to date      No Growth to date      No Growth to date    Narrative:      Aerobic and anaerobic  Collection has been rescheduled by MYB at 03/14/2025 00:30 Reason: "   Unable to collect 2nd set. Nurse Notified  Collection has been rescheduled by MYB at 03/14/2025 00:30 Reason:   Unable to collect 2nd set. Nurse Notified    Culture, Respiratory with Gram Stain [[id number]]  (Abnormal) Collected: 03/17/25 0753    Order Status: Completed Specimen: Respiratory from Sputum, Expectorated Updated: 03/18/25 0704     Respiratory Culture GRAM NEGATIVE LYNN, NON-LACTOSE   Moderate  Identification and susceptibility pending       Gram Stain (Respiratory) >10 epithelial cells per low power field     Gram Stain (Respiratory) Many WBC's     Gram Stain (Respiratory) Moderate Gram negative rods    Blood culture x two cultures. Draw prior to antibiotics [[id number]] Collected: 03/13/25 2344    Order Status: Completed Specimen: Blood from Peripheral, Hand, Right Updated: 03/18/25 0232     Blood Culture, Routine No Growth to date      No Growth to date      No Growth to date      No Growth to date      No Growth to date    Narrative:      Aerobic and anaerobic    MRSA Screen by PCR [[id number]] Collected: 03/14/25 1343    Order Status: Completed Specimen: Nasal Swab Updated: 03/14/25 1651     MRSA SCREEN BY PCR Not Detected    Respiratory Infection Panel (PCR), Nasopharyngeal [[id number]] Collected: 03/14/25 0902    Order Status: Completed Specimen: Nasopharyngeal Swab Updated: 03/14/25 1103     Respiratory Infection Panel Source NP swab     Adenovirus Not Detected     Coronavirus 229E, Common Cold Virus Not Detected     Coronavirus HKU1, Common Cold Virus Not Detected     Coronavirus NL63, Common Cold Virus Not Detected     Coronavirus OC43, Common Cold Virus Not Detected     Comment: Coronavirus strains 229E, HKU1, NL63, and OC43 can cause the common   cold   and are not associated with the respiratory disease outbreak caused   by  the COVID-19 (SARS-CoV-2 novel Coronavirus) strain.           SARS-CoV2 (COVID-19) Qualitative PCR Not Detected     Human Metapneumovirus Not Detected      Human Rhinovirus/Enterovirus Not Detected     Influenza A Not Detected     Influenza B Not Detected     Parainfluenza Virus 1 Not Detected     Parainfluenza Virus 2 Not Detected     Parainfluenza Virus 3 Not Detected     Parainfluenza Virus 4 Not Detected     Respiratory Syncytial Virus Not Detected     Bordetella Parapertussis ([id number]) Not Detected     Bordetella pertussis (ptxP) Not Detected     Chlamydia pneumoniae Not Detected     Mycoplasma pneumoniae Not Detected     Comment: Respiratory Infection Panel testing performed by Multiplex PCR.       Narrative:      Respiratory Infection Panel source->NP Swab            Radiology    X-Ray Chest 1 View  Narrative: EXAMINATION:  XR CHEST 1 VIEW    CLINICAL HISTORY:  sob;    FINDINGS:  Portable chest with comparison chest x-ray 03/14/2025.  Normal cardiomediastinal silhouette.Lungs are clear. Pulmonary vasculature is normal. No acute osseous abnormality.  Left brachial venous line noted.  Impression: No acute cardiopulmonary abnormality.    Electronically signed by: Oscar Mccormick  Date:    03/17/2025  Time:    15:12    CXR with hyperinflation and flattened diaphragms    Additional Studies    reviewed    Ventilator Information    Oxygen Concentration (%):  [30] 30             Recent Labs     03/17/25  1453   PH 7.355   PCO2 55.1*   PO2 89   HCO3 30.8*   POCSATURATED 96   BE 5*         Impression    Active Hospital Problems    Diagnosis  POA    *Acute on chronic respiratory failure with hypoxia [J96.21]  Yes    Sepsis [A41.9]  Yes    Possible Community acquired pneumonia [J18.9]  Yes    Severe malnutrition [E43]  Yes    COPD with acute exacerbation [J44.1]  Yes    Laryngeal cancer s/p surgery and chemo [C32.9]  Yes    PEG tube  [K94.23]  Yes    Bronchiectasis [J47.9]  Yes      Resolved Hospital Problems   No resolved problems to display.       Plan    Continue respiratory treatments, steroids, antibiotics  BiPAP as tolerated, O2 as able  If he worsens we will move to  ICU and intubate though prognosis for liberation would be very poor  Would continue his chronic pain meds to help with pain, air hunger and avoid any withdrawal  Watch closely  Lovenox for prophylaxis  Agree with plans for palliative care team to meet with them and help them discuss goals of care going forward    Thank you for this consult.  I will follow with you while the patient is hospitalized.        Anton Mak MD  [facility] Pulmonary/Critical Care  03/18/2025               [1]   Social History  Tobacco Use   Smoking Status Former    Types: Cigarettes   Smokeless Tobacco Not on file

## 2025-03-18 NOTE — NURSING
About an hour after I gave this patient a can of his glucerna via his peg tube, he threw up a significant amount. The patient did not have any signs or symptoms of aspiration. I let Dr. Copeland know.

## 2025-03-18 NOTE — CARE UPDATE
03/18/25 0747   Patient Assessment/Suction   Level of Consciousness (AVPU) alert   Respiratory Effort Normal;Unlabored   Expansion/Accessory Muscles/Retractions abdominal muscle use   All Lung Fields Breath Sounds coarse   Rhythm/Pattern, Respiratory assisted mechanically   Cough Frequency infrequent   Cough Type weak   Skin Integrity   $ Wound Care Tech Time 15 min   Area Observed Bridge of nose   Skin Appearance without discoloration   PRE-TX-O2   Device (Oxygen Therapy) BIPAP  (patient requested to stay on bipap for now)   $ Is the patient on High Flow Oxygen? Yes   Oxygen Concentration (%) 30   SpO2 98 %   Pulse Oximetry Type Continuous   $ Pulse Oximetry - Multiple Charge Pulse Oximetry - Multiple   Pulse (!) 114   Resp (!) 22   Aerosol Therapy   $ Aerosol Therapy Charges Aerosol Treatment   Daily Review of Necessity (SVN) completed   Respiratory Treatment Status (SVN) given   Treatment Route (SVN) in-line   Patient Position HOB elevated   Post Treatment Assessment (SVN) increased aeration   Signs of Intolerance (SVN) none   Breath Sounds Post-Respiratory Treatment   Throughout All Fields Post-Treatment All Fields   Throughout All Fields Post-Treatment aeration increased   Post-treatment Heart Rate (beats/min) 112   Post-treatment Resp Rate (breaths/min) 21   Chest Physiotherapy   $ Chest Physiotherapy Charges Subsequent   Daily Review of Necessity (CPT) completed   Type (CPT) percussion   Method (CPT) mechanical percussor  (also used patients cpt vest)   Patient Position (CPT) HOB elevated   Lung Fields (CPT) Anterior:;Lateral:;MIRIAM (left upper lobe);LLL (left lower lobe);RUL (right upper lobe);RML (right middle lobe);RLL (right lower lobe)   Duration per Field (CPT) 2 mins   Duration Left Side (CPT) 5 mins   Duration Right Side (CPT) 5 mins   CPT Total Time (Min) 10   Post Treatment Assessment (CPT) breath sounds unchanged   Signs of Intolerance (CPT) none   Preset CPAP/BiPAP Settings   Mode Of Delivery  BiPAP S/T   CPAP/BIPAP charged w/in last 24 h YES   $ Initial CPAP/BiPAP Setup? No   $ Is patient using? Yes   Size of Mask Other (see comments)  (size a under nose)   Sized Appropriately? Yes   Equipment Type V60   Airway Device Type under nose mask A   Ipap 10   EPAP (cm H2O) 5   Pressure Support (cm H2O) 5   Set Rate (Breaths/Min) 16   ITime (sec) 0.9   Rise Time (sec) 3   Patient CPAP/BiPAP Settings   Timed Inspiration (Sec) 0.9   IPAP Rise Time (sec) 3   RR Total (Breaths/Min) 24   Tidal Volume (mL) 312   VE Minute Ventilation (L/min) 7.9 L/min   Peak Inspiratory Pressure (cm H2O) 11   TiTOT (%) 27   Total Leak (L/Min) 55   Patient Trigger - ST Mode Only (%) 98   CPAP/BiPAP Backup Settings   Backup Rate 16 breaths per minute (bpm)   CPAP/BiPAP Alarms   High Pressure (cm H2O) 20   Low Pressure (cm H2O) 3   Minute Ventilation (L/Min) 3   High RR (breaths/min) 40   Low RR (breaths/min) 8   Apnea (Sec) 20   Education   $ Education BiPAP;Bronchodilator;15 min

## 2025-03-18 NOTE — PT/OT/SLP PROGRESS
Physical Therapy      Patient Name:  Reji Romano   MRN:  [id number]    Patient not seen today secondary to on hold (on BiPAP) . Will follow-up next scheduled visit.

## 2025-03-18 NOTE — ASSESSMENT & PLAN NOTE
Patient has a diagnosis of pneumonia. The cause of the pneumonia is suspected to be bacterial in etiology but organism is not known. The pneumonia is stable. The patient has the following signs/symptoms of pneumonia: persistent hypoxia  and cough. The patient does have a current oxygen requirement and the patient does have a home oxygen requirement. I have reviewed the pertinent imaging. The following cultures have been collected: Blood cultures and Sputum culture The culture results are listed below.     Current antimicrobial regimen consists of the antibiotics listed below. Will monitor patient closely and continue current treatment plan unchanged.   Follow Pulmonary Medicine recommendations.  Antibiotics (From admission, onward)      Start     Stop Route Frequency Ordered    03/16/25 0945  metronidazole IVPB 500 mg         -- IV Every 8 hours (non-standard times) 03/16/25 0842    03/14/25 0915  ceFEPIme injection 2 g         -- IV Every 8 hours (non-standard times) 03/14/25 0803            Microbiology Results (last 7 days)       Procedure Component Value Units Date/Time    Culture, Respiratory with Gram Stain [[id number]]  (Abnormal) Collected: 03/17/25 0753    Order Status: Completed Specimen: Respiratory from Sputum, Expectorated Updated: 03/18/25 0704     Respiratory Culture GRAM NEGATIVE LYNN, NON-LACTOSE   Moderate  Identification and susceptibility pending       Gram Stain (Respiratory) >10 epithelial cells per low power field     Gram Stain (Respiratory) Many WBC's     Gram Stain (Respiratory) Moderate Gram negative rods    Blood culture x two cultures. Draw prior to antibiotics [[id number]] Collected: 03/13/25 2346    Order Status: Completed Specimen: Blood from Peripheral, Hand, Right Updated: 03/18/25 0232     Blood Culture, Routine No Growth to date      No Growth to date      No Growth to date      No Growth to date      No Growth to date    Narrative:      Aerobic and anaerobic    Blood  culture x two cultures. Draw prior to antibiotics [[id number]] Collected: 03/14/25 0653    Order Status: Completed Specimen: Blood Updated: 03/17/25 0832     Blood Culture, Routine No Growth to date      No Growth to date      No Growth to date      No Growth to date    Narrative:      Aerobic and anaerobic  Collection has been rescheduled by MYB at 03/14/2025 00:30 Reason:   Unable to collect 2nd set. Nurse Notified  Collection has been rescheduled by MYB at 03/14/2025 00:30 Reason:   Unable to collect 2nd set. Nurse Notified    MRSA Screen by PCR [[id number]] Collected: 03/14/25 1343    Order Status: Completed Specimen: Nasal Swab Updated: 03/14/25 1651     MRSA SCREEN BY PCR Not Detected    Respiratory Infection Panel (PCR), Nasopharyngeal [[id number]] Collected: 03/14/25 0902    Order Status: Completed Specimen: Nasopharyngeal Swab Updated: 03/14/25 1103     Respiratory Infection Panel Source NP swab     Adenovirus Not Detected     Coronavirus 229E, Common Cold Virus Not Detected     Coronavirus HKU1, Common Cold Virus Not Detected     Coronavirus NL63, Common Cold Virus Not Detected     Coronavirus OC43, Common Cold Virus Not Detected     Comment: Coronavirus strains 229E, HKU1, NL63, and OC43 can cause the common   cold   and are not associated with the respiratory disease outbreak caused   by  the COVID-19 (SARS-CoV-2 novel Coronavirus) strain.           SARS-CoV2 (COVID-19) Qualitative PCR Not Detected     Human Metapneumovirus Not Detected     Human Rhinovirus/Enterovirus Not Detected     Influenza A Not Detected     Influenza B Not Detected     Parainfluenza Virus 1 Not Detected     Parainfluenza Virus 2 Not Detected     Parainfluenza Virus 3 Not Detected     Parainfluenza Virus 4 Not Detected     Respiratory Syncytial Virus Not Detected     Bordetella Parapertussis ([id number]) Not Detected     Bordetella pertussis (ptxP) Not Detected     Chlamydia pneumoniae Not Detected     Mycoplasma pneumoniae Not  Detected     Comment: Respiratory Infection Panel testing performed by Multiplex PCR.       Narrative:      Respiratory Infection Panel source->NP Swab            Empiric cefepime azithromycin

## 2025-03-18 NOTE — PT/OT/SLP PROGRESS
Occupational Therapy   Treatment    Name: Reji Romano  MRN: [id number]  Admitting Diagnosis:  Acute on chronic respiratory failure with hypoxia       Recommendations:     Discharge Recommendations: Low Intensity Therapy (Wife and patient declined HHOT)  Discharge Equipment Recommendations:  none  Barriers to discharge:   (O2 desaturation with activity.)    Assessment:     Reji Romano is a 70 y.o. male with a medical diagnosis of Acute on chronic respiratory failure with hypoxia.  He presents with general weakness. Patient patient participated in bed mobility and unsupported sitting EOB for 2 minutes. Performance deficits affecting function are weakness, impaired endurance, impaired self care skills, impaired functional mobility, gait instability, impaired balance, decreased safety awareness, decreased lower extremity function, decreased upper extremity function.     Rehab Prognosis:  Fair; patient would benefit from acute skilled OT services to address these deficits and reach maximum level of function.       Plan:     Patient to be seen 3 x/week to address the above listed problems via self-care/home management, therapeutic activities, therapeutic exercises  Plan of Care Expires: 04/17/25  Plan of Care Reviewed with: patient, spouse    Subjective     Chief Complaint: SOB with all activity  Patient/Family Comments/goals: Reduced SOB and improved functional mobility.  Pain/Comfort:  Pain Rating 1: 0/10  Pain Rating Post-Intervention 1: 0/10    Objective:     Communicated with: nurse prior to session.  Patient found HOB elevated with telemetry, peripheral IV, PEG Tube, BIPAP upon OT entry to room.    General Precautions: Standard, fall, respiratory    Orthopedic Precautions:N/A  Braces: N/A  Respiratory Status:  Bipap     Occupational Performance:     Bed Mobility:    Patient completed Scooting/Bridging with contact guard assistance  Patient completed Supine to Sit with contact guard assistance  Patient completed  Sit to Supine with contact guard assistance   Performed unsupported sitting EOB for 2 minutes with contact guard assistance.     [facility] 6 Click ADL: 19    Treatment & Education:  Patient tolerated sitting EOB for 2 minutes on Bipap with O2 sats 90% and above.     Patient left HOB elevated with all lines intact and call button in reach    GOALS:   Multidisciplinary Problems       Occupational Therapy Goals          Problem: Occupational Therapy    Goal Priority Disciplines Outcome Interventions   Occupational Therapy Goal     OT, PT/OT Progressing    Description: Goals to be met by: 4/17/2025     Patient will increase functional independence with ADLs by performing:    UE Dressing with Stand-by Assistance.  LE Dressing with Minimal Assistance.  Grooming while seated with Modified [place].  Toileting from toilet with Stand-by Assistance for hygiene and clothing management.   Toilet transfer to toilet with Stand-by Assistance.  Upper extremity exercise program x10 reps per handout, with independence.                         Time Tracking:     OT Date of Treatment: 03/18/25  OT Start Time: 1056  OT Stop Time: 1107  OT Total Time (min): 11 min    Billable Minutes:Therapeutic Activity 11    OT/ANANYA: OT          3/18/2025

## 2025-03-18 NOTE — PROGRESS NOTES
[facility]  Adult Nutrition   Progress Note (Nutrition Support Management)    SUMMARY     Recommendations  1) Continue current TF regimen of Glucerna 1.5 - 6 cans daily as before with 90 cc free water flush after each feeding to provide 2133 kcals and 118 g protein.   2) If kitchen out of Glucerna, suggest Diabetisource -7 cans daily to provide 2100 kcals, 105 g protein and 1428 mls water.   3) Suggest FWF 90mL after each feed. TF to meet 100% EEN and EPN.   4) RD will continue to follow biweekly and prn.  .    Nutrition Goal Status: goal met  Communication of RD Recs: other (comment)    Nutrition Goals: Provide >75% nutritional needs via nutrition support by RD follow up.    Nutrition Interventions: Energy Modified diet, Carbohydrate modified diet, and Enteral Nutrition Management    Nutrition Diagnosis PES Statement: Inadequate EN infusion related to TF being held as evidenced by pt with TF given today.     Nutrition Diagnosis Status:   Continues    Dietitian Rounds Brief  F/U Nutrition Note: Saw pt and wife (Linh) today and I inquired about pts TF and pts wife did not know what a bolus was so I explained it to her and she said she knew what it was but had never heard it called that. She states her  has not been able to tolerate much of his TF. Still with off and on diarrhea but no definite abdominal pain or tenderness. Surprisingly, his skin is intact.     70-year-old male with a history of throat cancer status post chemoradiation, chronic dysphagia/ dysarthria PEG tube feeding, COPD on 2 L home ,chronic Pseudomonas colonization came in with worsening SOB and admitted for acute on chronic hypoxic respiratory failure likely multifactorial including COPD exacerbation wth possible pneumonia (elevated processioning).  CT abdomen showed possible colitis and patient was started on antibiotics.  CTA of the chest was negative for PE.  Slowly wean off steroids as needed for COPD exacerbation.  "      Nutrition Related Social Determinants of Health:   Food Insecurity: Patient Declined (3/15/2025)    Hunger Vital Sign     Worried About Running Out of Food in the Last Year: Patient declined     Ran Out of Food in the Last Year: Patient declined         Malnutrition Assessment     Skin (Micronutrient): none           Diet order:   Current Diet Order: NPO      Current Nutrition Support Formula Ordered: Glucerna 1.5     Current Nutrition Support Frequency Ordered: Glucerna 1.5 6 cans daily as before with 90 cc free water flush after each feeding.    Evaluation of Received Nutrient/Fluid Intake  Enteral Calories (kcal): 2133  Enteral Protein (gm): 118  % Kcal Needs: 100  % Protein Needs: 100  Energy Calories Required: meeting needs  Protein Required: meeting needs  Fluid Required: meeting needs  Tolerance: tolerating     % Intake of Estimated Energy Needs: 75 - 100 %  % Meal Intake: NPO      Intake/Output Summary (Last 24 hours) at 3/18/2025 1146  Last data filed at 3/18/2025 1113  Gross per 24 hour   Intake 607.84 ml   Output 600 ml   Net 7.84 ml        Anthropometrics  Height: 5' 5" (165.1 cm)  Height (inches): 65 in  Height Method: Stated  Weight: 49 kg (108 lb 0.4 oz)  Weight (lb): 108.03 lb  Weight Method: Bed Scale  Ideal Body Weight (IBW), Male: 136 lb  % Ideal Body Weight, Male (lb): 77.65 %  BMI (Calculated): 18  BMI Grade: less than 18.5 - underweight, less than 23 (older than 65 years) - underweight  Usual Body Weight (UBW), k.9 kg (2024)  % Usual Body Weight: 100.2       Estimated/Assessed Needs  Weight Used For Calorie Calculations: 49 kg (108 lb 0.4 oz)  Energy Calorie Requirements (kcal): 7908-6869 kcals/day (35-40 kcals/kg ABW)  Energy Need Method: Kcal/kg (30-35)  Protein Requirements: 74-98 g/day (1.5-2.0 g/kg ABW)  Weight Used For Protein Calculations: 49 kg (108 lb 0.4 oz)  Fluid Requirements (mL): 1986-0794     RDA Method (mL): 1715  CHO Requirement: 214-245 g CHO/day    Reason for " Assessment  Reason For Assessment: RD follow-up  Diagnosis: pulmonary disease  General Information Comments: Consult received for tube feeding. 70-year-old  male with a history of severe COPD, chronic hypoxic respiratory failure on 2 L nasal cannula 24/7, severe cachexia with a BMI 18.3,  throat cancer status post chemoradiation with chronic dysphagia on PEG tube feeding presenting here for worsened shortness of breaths for past few days. RD covering remotely. Attempted to call wife with no answer to discuss home feeds. Per EMR, pt tolerated Glucerna 1.5-4 cartons daily. No GI distress. LBM PTA. Skin: intact per chart. UBW 49.9kg (9/2024), CBW 49.9kg reflecting wt maintanence. NFPE to be complete by on-site RD d/t risk of malnutrition.    Final diagnoses:  [J44.1] COPD exacerbation (Primary)  [A41.9] Sepsis, due to unspecified organism, unspecified whether acute organ dysfunction present  [I95.9] Hypotension, unspecified hypotension type  [E87.20] Lactic acidosis  [J96.21] Acute on chronic respiratory failure with hypoxia  [R06.02] Shortness of breath     History reviewed. No pertinent past medical history.     Nutrition/Diet History  Nutrition Intake History: Unable to assess  Spiritual, Cultural Beliefs, Advent Practices, Values that Affect Care: no  Food Allergies: NKFA  Factors Affecting Nutritional Intake: NPO    Nutrition Risk Screen        MST Score: 0  Have you recently lost weight without trying?: No  Weight loss score: 0  Have you been eating poorly because of a decreased appetite?: No  Appetite score: 0       Weight History:  Wt Readings from Last 10 Encounters:   03/18/25 49 kg (108 lb 0.4 oz)   03/04/24 54 kg (119 lb)   06/04/23 57.6 kg (126 lb 15.8 oz)        Lab/Procedures/Meds: Pertinent Labs/Meds Reviewed    Medications:Pertinent Medications Reviewed  Scheduled Meds:   albuterol-ipratropium  3 mL Nebulization Q4H WAKE    amitriptyline  50 mg Oral QHS    arformoteroL  15 mcg Nebulization  BID    budesonide  1 mg Nebulization Q12H    ceFEPime IV (PEDS and ADULTS)  2 g Intravenous Q8H    enoxparin  40 mg Subcutaneous Daily    levothyroxine  125 mcg Oral QHS    methylPREDNISolone injection (PEDS and ADULTS)  40 mg Intravenous Q8H    metroNIDAZOLE IV (PEDS and ADULTS)  500 mg Intravenous Q8H    roflumilast  500 mcg Oral Daily    sodium chloride 3%  4 mL Nebulization BID     Continuous Infusions:  PRN Meds:.  Current Facility-Administered Medications:     hydrOXYzine HCL, 25 mg, Oral, BID PRN    morphine, 2 mg, Intravenous, Q4H PRN    ondansetron, 4 mg, Intravenous, Q12H PRN    oxyCODONE, 20 mg, Oral, TID PRN    Flushing PICC/Midline Protocol, , , Until Discontinued **AND** sodium chloride 0.9%, 10 mL, Intravenous, Q12H PRN    sodium chloride 0.9%, 3 mL, Intravenous, PRN    Labs: Pertinent Labs Reviewed  Clinical Chemistry:  Recent Labs   Lab 03/16/25  0504 03/17/25  0322 03/18/25  0942    144 146*   K 4.1 4.1 4.1    108 106   CO2 28 28 33*   * 124* 133*   BUN 22 26* 37*   CREATININE 0.5 0.5 0.8   CALCIUM 8.7 8.4* 8.4*   PROT 5.6* 4.6* 5.1*   ALBUMIN 2.9* 2.5* 2.6*   BILITOT 0.3 0.3 0.5   ALKPHOS 80 73 81   AST 15 13 18   ALT 23 20 26   ANIONGAP 7* 8 7*   MG 2.2 2.2 2.3   PHOS 3.4 3.3 3.0     CBC:   Recent Labs   Lab 03/18/25  0630   WBC 38.13*   RBC 5.25   HGB 15.1   HCT 49.7      MCV 95   MCH 28.8   MCHC 30.4*       Cardiac Profile:  Recent Labs   Lab 03/13/25  2342   BNP 99       Monitor and Evaluation  Monitor and Evaluation: Energy intake, Enteral and parenteral nutrition administration, Weight, Electrolyte and renal panel, Gastrointestinal profile, Glucose/endocrine profile, Inflammatory profile, Nutrition focused physical findings     Discharge Planning  Nutrition Discharge Planning: Enteral nutrition (comments)    Nutrition Risk  Level of Risk/Frequency of Follow-up:  (2x/week)     Nutrition Follow-Up  RD Follow-up?: Yes

## 2025-03-18 NOTE — SUBJECTIVE & OBJECTIVE
Interval History:  Frail elderly male   Who is requiring BiPAP noninvasive ventilatory therapy, FiO2 30% since last night.  Patient is alert and responsive.  Patient's daughter is present at bedside.  Palliative Medicine to evaluate patient.  Pulmonary medicine closely following.  Still with off and on diarrhea but no definite abdominal pain or tenderness. Denies any chest pain or any focal neurological complaints.     Review of Systems   All other systems reviewed and are negative.    Objective:     Vital Signs (Most Recent):  Temp: 97.4 °F (36.3 °C) (03/18/25 0300)  Pulse: (!) 112 (03/18/25 0500)  Resp: 20 (03/18/25 0741)  BP: (!) 159/93 (03/18/25 0400)  SpO2: 97 % (03/18/25 0200) Vital Signs (24h Range):  Temp:  [96.8 °F (36 °C)-97.6 °F (36.4 °C)] 97.4 °F (36.3 °C)  Pulse:  [] 112  Resp:  [18-48] 20  SpO2:  [70 %-97 %] 97 %  BP: ()/(65-96) 159/93     Weight: 49 kg (108 lb 0.4 oz)  Body mass index is 17.98 kg/m².    Intake/Output Summary (Last 24 hours) at 3/18/2025 0742  Last data filed at 3/18/2025 0637  Gross per 24 hour   Intake 607.84 ml   Output 600 ml   Net 7.84 ml         Physical Exam  Constitutional:       Appearance: He is ill-appearing.   Cardiovascular:      Rate and Rhythm: Normal rate.      Pulses: Normal pulses.   Pulmonary:      Breath sounds: Wheezing and rales present.   Abdominal:      General: There is no distension.      Comments: PEG tube in place   Neurological:      Mental Status: He is oriented to person, place, and time.               Significant Labs:   Lab Results   Component Value Date    WBC 38.13 (HH) 03/18/2025    HGB 15.1 03/18/2025    HCT 49.7 03/18/2025    MCV 95 03/18/2025     03/18/2025       CMP  Sodium   Date Value Ref Range Status   03/17/2025 144 136 - 145 mmol/L Final     Potassium   Date Value Ref Range Status   03/17/2025 4.1 3.5 - 5.1 mmol/L Final     Chloride   Date Value Ref Range Status   03/17/2025 108 95 - 110 mmol/L Final     CO2   Date Value  Ref Range Status   03/17/2025 28 23 - 29 mmol/L Final     Glucose   Date Value Ref Range Status   03/17/2025 124 (H) 70 - 110 mg/dL Final     BUN   Date Value Ref Range Status   03/17/2025 26 (H) 8 - 23 mg/dL Final     Creatinine   Date Value Ref Range Status   03/17/2025 0.5 0.5 - 1.4 mg/dL Final     Calcium   Date Value Ref Range Status   03/17/2025 8.4 (L) 8.7 - 10.5 mg/dL Final     Total Protein   Date Value Ref Range Status   03/17/2025 4.6 (L) 6.0 - 8.4 g/dL Final     Albumin   Date Value Ref Range Status   03/17/2025 2.5 (L) 3.5 - 5.2 g/dL Final     Total Bilirubin   Date Value Ref Range Status   03/17/2025 0.3 0.1 - 1.0 mg/dL Final     Comment:     For infants and newborns, interpretation of results should be based  on gestational age, weight and in agreement with clinical  observations.    Premature Infant recommended reference ranges:  Up to 24 hours.............<8.0 mg/dL  Up to 48 hours............<12.0 mg/dL  3-5 days..................<15.0 mg/dL  6-29 days.................<15.0 mg/dL       Alkaline Phosphatase   Date Value Ref Range Status   03/17/2025 73 55 - 135 U/L Final     AST   Date Value Ref Range Status   03/17/2025 13 10 - 40 U/L Final     ALT   Date Value Ref Range Status   03/17/2025 20 10 - 44 U/L Final     Anion Gap   Date Value Ref Range Status   03/17/2025 8 8 - 16 mmol/L Final     eGFR   Date Value Ref Range Status   03/17/2025 >60.0 >60 mL/min/1.73 m^2 Final     Microbiology Results (last 7 days)       Procedure Component Value Units Date/Time    Culture, Respiratory with Gram Stain [[id number]]  (Abnormal) Collected: 03/17/25 0753    Order Status: Completed Specimen: Respiratory from Sputum, Expectorated Updated: 03/18/25 0704     Respiratory Culture GRAM NEGATIVE LYNN, NON-LACTOSE   Moderate  Identification and susceptibility pending       Gram Stain (Respiratory) >10 epithelial cells per low power field     Gram Stain (Respiratory) Many WBC's     Gram Stain (Respiratory)  Moderate Gram negative rods    Blood culture x two cultures. Draw prior to antibiotics [[id number]] Collected: 03/13/25 2344    Order Status: Completed Specimen: Blood from Peripheral, Hand, Right Updated: 03/18/25 0232     Blood Culture, Routine No Growth to date      No Growth to date      No Growth to date      No Growth to date      No Growth to date    Narrative:      Aerobic and anaerobic    Blood culture x two cultures. Draw prior to antibiotics [[id number]] Collected: 03/14/25 0653    Order Status: Completed Specimen: Blood Updated: 03/17/25 0832     Blood Culture, Routine No Growth to date      No Growth to date      No Growth to date      No Growth to date    Narrative:      Aerobic and anaerobic  Collection has been rescheduled by MYB at 03/14/2025 00:30 Reason:   Unable to collect 2nd set. Nurse Notified  Collection has been rescheduled by MYB at 03/14/2025 00:30 Reason:   Unable to collect 2nd set. Nurse Notified    MRSA Screen by PCR [[id number]] Collected: 03/14/25 1343    Order Status: Completed Specimen: Nasal Swab Updated: 03/14/25 1651     MRSA SCREEN BY PCR Not Detected    Respiratory Infection Panel (PCR), Nasopharyngeal [[id number]] Collected: 03/14/25 0902    Order Status: Completed Specimen: Nasopharyngeal Swab Updated: 03/14/25 1103     Respiratory Infection Panel Source NP swab     Adenovirus Not Detected     Coronavirus 229E, Common Cold Virus Not Detected     Coronavirus HKU1, Common Cold Virus Not Detected     Coronavirus NL63, Common Cold Virus Not Detected     Coronavirus OC43, Common Cold Virus Not Detected     Comment: Coronavirus strains 229E, HKU1, NL63, and OC43 can cause the common   cold   and are not associated with the respiratory disease outbreak caused   by  the COVID-19 (SARS-CoV-2 novel Coronavirus) strain.           SARS-CoV2 (COVID-19) Qualitative PCR Not Detected     Human Metapneumovirus Not Detected     Human Rhinovirus/Enterovirus Not Detected     Influenza A Not  Detected     Influenza B Not Detected     Parainfluenza Virus 1 Not Detected     Parainfluenza Virus 2 Not Detected     Parainfluenza Virus 3 Not Detected     Parainfluenza Virus 4 Not Detected     Respiratory Syncytial Virus Not Detected     Bordetella Parapertussis ([id number]) Not Detected     Bordetella pertussis (ptxP) Not Detected     Chlamydia pneumoniae Not Detected     Mycoplasma pneumoniae Not Detected     Comment: Respiratory Infection Panel testing performed by Multiplex PCR.       Narrative:      Respiratory Infection Panel source->NP Swab          Significant Imaging:   CT abdomen and Pelvis with IV contrast:  1.  Moderate to severe colonic wall thickening in the ascending and rectosigmoid colon, with mild to moderate colonic wall thickening in the remainder of the colon compatible with pan-colitis (likely infectious/inflammatory, and or neutropenic, and less likely from vasculitis, ischemia or lymphoma), consider correlation with the symptoms, history and attention on follow-up imaging.  2.  Peg tube with balloon inflated in the body of the stomach.  No finding of bowel obstruction, intra-abdominal free air or leak.  3.  Numerous additional, and incidental findings as above.    ECHO:    Left Ventricle: The left ventricle is normal in size. Normal wall thickness. There is normal systolic function with a visually estimated ejection fraction of 65 - 70%. There is normal diastolic function.    Right Ventricle: The right ventricle is normal in size. Systolic function is normal.    IVC/SVC: Normal venous pressure at 3 mmHg.    CXR:  Small bilateral pleural effusions with adjacent airspace disease or atelectasis, left greater than right. Background of chronic interstitial changes. Normal heart size.     CTA Chest:  1. No evidence of pulmonary embolism to the segmental arterial level. If there is continued clinical concern, consider further evaluation with lower extremity doppler.  2. Mild bronchiectasis with a  central hilar lower lobe predominance.  3.  Minimal scattered punctate nodules present, partially obscured by motion artifact suggesting either minimal infection, atelectasis or scarring.  The largest measurable nodule is 4 mm in diameter, and is unchanged from the previous CT, for which no additional follow-up will be warranted.    CXR:  No acute cardiopulmonary abnormality.

## 2025-03-18 NOTE — SUBJECTIVE & OBJECTIVE
Interval History:  See HPI    History reviewed. No pertinent past medical history.    History reviewed. No pertinent surgical history.    Review of patient's allergies indicates:   Allergen Reactions    Propoxyphene Anaphylaxis       Medications:  Continuous Infusions:  Scheduled Meds:   albuterol-ipratropium  3 mL Nebulization Q4H WAKE    amitriptyline  50 mg Oral QHS    arformoteroL  15 mcg Nebulization BID    budesonide  1 mg Nebulization Q12H    ceFEPime IV (PEDS and ADULTS)  2 g Intravenous Q8H    enoxparin  40 mg Subcutaneous Daily    levothyroxine  125 mcg Oral QHS    methylPREDNISolone injection (PEDS and ADULTS)  40 mg Intravenous Q8H    metroNIDAZOLE IV (PEDS and ADULTS)  500 mg Intravenous Q8H    roflumilast  500 mcg Oral Daily    sodium chloride 3%  4 mL Nebulization BID     PRN Meds:  Current Facility-Administered Medications:     hydrOXYzine HCL, 25 mg, Oral, BID PRN    morphine, 2 mg, Intravenous, Q4H PRN    ondansetron, 4 mg, Intravenous, Q12H PRN    oxyCODONE, 20 mg, Oral, TID PRN    Flushing PICC/Midline Protocol, , , Until Discontinued **AND** sodium chloride 0.9%, 10 mL, Intravenous, Q12H PRN    sodium chloride 0.9%, 3 mL, Intravenous, PRN    Family History    None       Tobacco Use    Smoking status: Former     Types: Cigarettes    Smokeless tobacco: Not on file   Substance and Sexual Activity    Alcohol use: Not Currently    Drug use: Not Currently    Sexual activity: Not Currently       Review of Systems  Objective:     Vital Signs (Most Recent):  Temp: 97.4 °F (36.3 °C) (03/18/25 0300)  Pulse: (!) 117 (03/18/25 1120)  Resp: 20 (03/18/25 1452)  BP: 103/71 (03/18/25 0800)  SpO2: 98 % (03/18/25 1120) Vital Signs (24h Range):  Temp:  [96.8 °F (36 °C)-97.4 °F (36.3 °C)] 97.4 °F (36.3 °C)  Pulse:  [] 117  Resp:  [18-48] 20  SpO2:  [70 %-98 %] 98 %  BP: ()/(65-93) 103/71     Weight: 49 kg (108 lb 0.4 oz)  Body mass index is 17.98 kg/m².       Physical Exam  Vitals reviewed.    Constitutional:       General: He is not in acute distress.     Appearance: He is ill-appearing.   HENT:      Head: Normocephalic and atraumatic.      Right Ear: External ear normal.      Left Ear: External ear normal.      Nose: Nose normal. No congestion.      Mouth/Throat:      Mouth: Mucous membranes are moist.      Pharynx: No oropharyngeal exudate.   Eyes:      General:         Right eye: No discharge.         Left eye: No discharge.   Cardiovascular:      Rate and Rhythm: Tachycardia present.   Pulmonary:      Effort: Pulmonary effort is normal. No respiratory distress.      Comments: On nasal cannula oxygen  Abdominal:      General: There is no distension.      Palpations: Abdomen is soft.      Tenderness: There is no abdominal tenderness.   Neurological:      General: No focal deficit present.      Mental Status: He is alert. Mental status is at baseline.      Comments: Severe dysarthria   Psychiatric:      Comments: Mood, behavior, thought content and judgment appear to be appropriate; difficult to fully assess due to severe dysarthria            Review of Symptoms      Symptom Assessment (ESAS 0-10 Scale)  Pain:  0  Dyspnea:  4  Anxiety:  2  Nausea:  0  Depression:  0  Anorexia:  2  Fatigue:  6  Insomnia:  0  Restlessness:  0  Agitation:  0         Performance Status:  20    Psychosocial/Cultural:   See Palliative Psychosocial Note: No  **Primary  to Follow**  Palliative Care  Consult: No        Advance Care Planning   Advance Directives:     Decision Making:  Patient answered questions and Family answered questions  Goals of Care: The patient and family endorses that what is most important right now is to focus on spending time at home, avoiding the hospital, remaining as independent as possible, symptom/pain control, and improvement in condition but with limits to invasive therapies    Accordingly, we have decided that the best plan to meet the patient's goals includes  continuing with treatment         Significant Labs: BMP:   Recent Labs   Lab 03/18/25  0942   *   *   K 4.1      CO2 33*   BUN 37*   CREATININE 0.8   CALCIUM 8.4*   MG 2.3     CBC:   Recent Labs   Lab 03/17/25  0322 03/17/25  1453 03/18/25  0630   WBC 25.73*  --  38.13*   HGB 12.3*  --  15.1   HCT 40.1 46 49.7     --  229     CBC:   Recent Labs   Lab 03/18/25  0630   WBC 38.13*   HGB 15.1   HCT 49.7   MCV 95        BMP:  Recent Labs   Lab 03/18/25  0942   *   *   K 4.1      CO2 33*   BUN 37*   CREATININE 0.8   CALCIUM 8.4*   MG 2.3     LFT:  Lab Results   Component Value Date    AST 18 03/18/2025    ALKPHOS 81 03/18/2025    BILITOT 0.5 03/18/2025     Albumin:   Albumin   Date Value Ref Range Status   03/18/2025 2.6 (L) 3.5 - 5.2 g/dL Final     Protein:   Total Protein   Date Value Ref Range Status   03/18/2025 5.1 (L) 6.0 - 8.4 g/dL Final     Lactic acid:   Lab Results   Component Value Date    LACTATE 1.5 03/14/2025    LACTATE 1.5 06/04/2023       Significant Imaging: I have reviewed all pertinent imaging results/findings within the past 24 hours.

## 2025-03-18 NOTE — ASSESSMENT & PLAN NOTE
Patient with Hypoxic Respiratory failure which is Acute on chronic.  he is on home oxygen at 2 LPM. Supplemental oxygen was provided and noted- Oxygen Concentration (%):  [30] 30  Signs/symptoms of respiratory failure include- increased work of breathing, respiratory distress, and use of accessory muscles. Contributing diagnoses includes - COPD and Pneumonia Labs and images were reviewed. Will treat underlying causes and adjust management of respiratory failure as follows  Antibiotics and steroids.   Follow Pulmonary Medicine recommendations.    D/W daughter, bedside nurse and .   Palliative Medicine to discuss goals of care.

## 2025-03-18 NOTE — HPI
"Per admit H&P: "This is a 70-year-old  male with a history of severe COPD, chronic hypoxic respiratory failure on 2 L nasal cannula 24/7, severe cachexia with a BMI 18.3,  throat cancer status post chemoradiation with chronic dysphagia on PEG tube feeding presenting here for worsened shortness of breaths for past few days.  Also reported may have altered mental status, however patient is awake alert when I evaluated him.  Patient has severe dysarthria secondary to previous throat cancer, history is obtained from wife at bedside.  Patient apparently has been feel unwell for few days, no sick contacts.  No chest pain.  No fever or chills.  Patient has PEG tube with every feeding/ medication through this.  Patient has is absolutely nothing by mouth.  Patient also has a chest vest on 3% normal saline nebulizer at home.   In the ER, patient is afebrile, hemodynamically stable.  WBC count is 40 K. patient did not received any steroids prior to this as per wife."    He is currently admitted and being treated for acute on chronic respiratory failure secondary to COPD exacerbation complicated by pneumonia.  He is critically ill with a guarded prognosis.  Given his cachexia, debility, and end-stage underlying COPD his prognosis going forward long-term appears to be quite poor.  I have been asked to assist with goals of care.  "

## 2025-03-18 NOTE — PROGRESS NOTES
Formerly [facility], [facility] Medicine  Progress Note    Patient Name: Reji Romano  MRN: [id number]  Patient Class: IP- Inpatient   Admission Date: 3/13/2025  Length of Stay: 4 days  Attending Physician: Zoey Copeland MD  Primary Care Provider: Brenda, Provider        Subjective     Principal Problem:Acute on chronic respiratory failure with hypoxia        HPI:  This is a 70-year-old  male with a history of severe COPD, chronic hypoxic respiratory failure on 2 L nasal cannula 24/7, severe cachexia with a BMI 18.3,  throat cancer status post chemoradiation with chronic dysphagia on PEG tube feeding presenting here for worsened shortness of breaths for past few days.  Also reported may have altered mental status, however patient is awake alert when I evaluated him.  Patient has severe dysarthria secondary to previous throat cancer, history is obtained from wife at bedside.  Patient apparently has been feel unwell for few days, no sick contacts.  No chest pain.  No fever or chills.  Patient has PEG tube with every feeding/ medication through this.  Patient has is absolutely nothing by mouth.  Patient also has a chest vest on 3% normal saline nebulizer at home.   In the ER, patient is afebrile, hemodynamically stable.  WBC count is 40 K. patient did not received any steroids prior to this as per wife.     Overview/Hospital Course:  70-year-old male with a history of throat cancer status post chemoradiation, chronic dysphagia/ dysarthria PEG tube feeding, COPD on 2 L home ,chronic Pseudomonas colonization came in with worsening SOB and admitted for acute on chronic hypoxic respiratory failure likely multifactorial including COPD exacerbation wth possible pneumonia (elevated processioning).  CT abdomen showed possible colitis and patient was started on antibiotics.  CTA of the chest was negative for PE.  Slowly wean off steroids as needed for COPD exacerbation.    Interval History:  Frail elderly male    Who is requiring BiPAP noninvasive ventilatory therapy, FiO2 30% since last night.  Patient is alert and responsive.  Patient's daughter is present at bedside.  Palliative Medicine to evaluate patient.  Pulmonary medicine closely following.  Still with off and on diarrhea but no definite abdominal pain or tenderness. Denies any chest pain or any focal neurological complaints.     Review of Systems   All other systems reviewed and are negative.    Objective:     Vital Signs (Most Recent):  Temp: 97.4 °F (36.3 °C) (03/18/25 0300)  Pulse: (!) 112 (03/18/25 0500)  Resp: 20 (03/18/25 0741)  BP: (!) 159/93 (03/18/25 0400)  SpO2: 97 % (03/18/25 0200) Vital Signs (24h Range):  Temp:  [96.8 °F (36 °C)-97.6 °F (36.4 °C)] 97.4 °F (36.3 °C)  Pulse:  [] 112  Resp:  [18-48] 20  SpO2:  [70 %-97 %] 97 %  BP: ()/(65-96) 159/93     Weight: 49 kg (108 lb 0.4 oz)  Body mass index is 17.98 kg/m².    Intake/Output Summary (Last 24 hours) at 3/18/2025 0742  Last data filed at 3/18/2025 0637  Gross per 24 hour   Intake 607.84 ml   Output 600 ml   Net 7.84 ml         Physical Exam  Constitutional:       Appearance: He is ill-appearing.   Cardiovascular:      Rate and Rhythm: Normal rate.      Pulses: Normal pulses.   Pulmonary:      Breath sounds: Wheezing and rales present.   Abdominal:      General: There is no distension.      Comments: PEG tube in place   Neurological:      Mental Status: He is oriented to person, place, and time.               Significant Labs:   Lab Results   Component Value Date    WBC 38.13 (HH) 03/18/2025    HGB 15.1 03/18/2025    HCT 49.7 03/18/2025    MCV 95 03/18/2025     03/18/2025       CMP  Sodium   Date Value Ref Range Status   03/17/2025 144 136 - 145 mmol/L Final     Potassium   Date Value Ref Range Status   03/17/2025 4.1 3.5 - 5.1 mmol/L Final     Chloride   Date Value Ref Range Status   03/17/2025 108 95 - 110 mmol/L Final     CO2   Date Value Ref Range Status   03/17/2025 28 23 -  29 mmol/L Final     Glucose   Date Value Ref Range Status   03/17/2025 124 (H) 70 - 110 mg/dL Final     BUN   Date Value Ref Range Status   03/17/2025 26 (H) 8 - 23 mg/dL Final     Creatinine   Date Value Ref Range Status   03/17/2025 0.5 0.5 - 1.4 mg/dL Final     Calcium   Date Value Ref Range Status   03/17/2025 8.4 (L) 8.7 - 10.5 mg/dL Final     Total Protein   Date Value Ref Range Status   03/17/2025 4.6 (L) 6.0 - 8.4 g/dL Final     Albumin   Date Value Ref Range Status   03/17/2025 2.5 (L) 3.5 - 5.2 g/dL Final     Total Bilirubin   Date Value Ref Range Status   03/17/2025 0.3 0.1 - 1.0 mg/dL Final     Comment:     For infants and newborns, interpretation of results should be based  on gestational age, weight and in agreement with clinical  observations.    Premature Infant recommended reference ranges:  Up to 24 hours.............<8.0 mg/dL  Up to 48 hours............<12.0 mg/dL  3-5 days..................<15.0 mg/dL  6-29 days.................<15.0 mg/dL       Alkaline Phosphatase   Date Value Ref Range Status   03/17/2025 73 55 - 135 U/L Final     AST   Date Value Ref Range Status   03/17/2025 13 10 - 40 U/L Final     ALT   Date Value Ref Range Status   03/17/2025 20 10 - 44 U/L Final     Anion Gap   Date Value Ref Range Status   03/17/2025 8 8 - 16 mmol/L Final     eGFR   Date Value Ref Range Status   03/17/2025 >60.0 >60 mL/min/1.73 m^2 Final     Microbiology Results (last 7 days)       Procedure Component Value Units Date/Time    Culture, Respiratory with Gram Stain [[id number]]  (Abnormal) Collected: 03/17/25 0753    Order Status: Completed Specimen: Respiratory from Sputum, Expectorated Updated: 03/18/25 0704     Respiratory Culture GRAM NEGATIVE LYNN, NON-LACTOSE   Moderate  Identification and susceptibility pending       Gram Stain (Respiratory) >10 epithelial cells per low power field     Gram Stain (Respiratory) Many WBC's     Gram Stain (Respiratory) Moderate Gram negative rods    Blood  culture x two cultures. Draw prior to antibiotics [[id number]] Collected: 03/13/25 2344    Order Status: Completed Specimen: Blood from Peripheral, Hand, Right Updated: 03/18/25 0232     Blood Culture, Routine No Growth to date      No Growth to date      No Growth to date      No Growth to date      No Growth to date    Narrative:      Aerobic and anaerobic    Blood culture x two cultures. Draw prior to antibiotics [[id number]] Collected: 03/14/25 0653    Order Status: Completed Specimen: Blood Updated: 03/17/25 0832     Blood Culture, Routine No Growth to date      No Growth to date      No Growth to date      No Growth to date    Narrative:      Aerobic and anaerobic  Collection has been rescheduled by MYB at 03/14/2025 00:30 Reason:   Unable to collect 2nd set. Nurse Notified  Collection has been rescheduled by MYB at 03/14/2025 00:30 Reason:   Unable to collect 2nd set. Nurse Notified    MRSA Screen by PCR [[id number]] Collected: 03/14/25 1343    Order Status: Completed Specimen: Nasal Swab Updated: 03/14/25 1651     MRSA SCREEN BY PCR Not Detected    Respiratory Infection Panel (PCR), Nasopharyngeal [[id number]] Collected: 03/14/25 0902    Order Status: Completed Specimen: Nasopharyngeal Swab Updated: 03/14/25 1103     Respiratory Infection Panel Source NP swab     Adenovirus Not Detected     Coronavirus 229E, Common Cold Virus Not Detected     Coronavirus HKU1, Common Cold Virus Not Detected     Coronavirus NL63, Common Cold Virus Not Detected     Coronavirus OC43, Common Cold Virus Not Detected     Comment: Coronavirus strains 229E, HKU1, NL63, and OC43 can cause the common   cold   and are not associated with the respiratory disease outbreak caused   by  the COVID-19 (SARS-CoV-2 novel Coronavirus) strain.           SARS-CoV2 (COVID-19) Qualitative PCR Not Detected     Human Metapneumovirus Not Detected     Human Rhinovirus/Enterovirus Not Detected     Influenza A Not Detected     Influenza B Not Detected      Parainfluenza Virus 1 Not Detected     Parainfluenza Virus 2 Not Detected     Parainfluenza Virus 3 Not Detected     Parainfluenza Virus 4 Not Detected     Respiratory Syncytial Virus Not Detected     Bordetella Parapertussis ([id number]) Not Detected     Bordetella pertussis (ptxP) Not Detected     Chlamydia pneumoniae Not Detected     Mycoplasma pneumoniae Not Detected     Comment: Respiratory Infection Panel testing performed by Multiplex PCR.       Narrative:      Respiratory Infection Panel source->NP Swab          Significant Imaging:   CT abdomen and Pelvis with IV contrast:  1.  Moderate to severe colonic wall thickening in the ascending and rectosigmoid colon, with mild to moderate colonic wall thickening in the remainder of the colon compatible with pan-colitis (likely infectious/inflammatory, and or neutropenic, and less likely from vasculitis, ischemia or lymphoma), consider correlation with the symptoms, history and attention on follow-up imaging.  2.  Peg tube with balloon inflated in the body of the stomach.  No finding of bowel obstruction, intra-abdominal free air or leak.  3.  Numerous additional, and incidental findings as above.    ECHO:    Left Ventricle: The left ventricle is normal in size. Normal wall thickness. There is normal systolic function with a visually estimated ejection fraction of 65 - 70%. There is normal diastolic function.    Right Ventricle: The right ventricle is normal in size. Systolic function is normal.    IVC/SVC: Normal venous pressure at 3 mmHg.    CXR:  Small bilateral pleural effusions with adjacent airspace disease or atelectasis, left greater than right. Background of chronic interstitial changes. Normal heart size.     CTA Chest:  1. No evidence of pulmonary embolism to the segmental arterial level. If there is continued clinical concern, consider further evaluation with lower extremity doppler.  2. Mild bronchiectasis with a central hilar lower lobe predominance.  3.  " Minimal scattered punctate nodules present, partially obscured by motion artifact suggesting either minimal infection, atelectasis or scarring.  The largest measurable nodule is 4 mm in diameter, and is unchanged from the previous CT, for which no additional follow-up will be warranted.    CXR:  No acute cardiopulmonary abnormality.       Assessment & Plan  Laryngeal cancer s/p surgery and chemo  Aware, patient has chronic PEG tube feeding.  Nothing by mouth strictly.   PEG feedings as per nutritionist recommendations.  Patient has chronic dysphagia/ dysarthria.   COPD with acute exacerbation  Patient's COPD is with exacerbation noted by continued dyspnea and use of accessory muscles for breathing currently.  Patient is currently on COPD Pathway. Continue scheduled inhalers Steroids, Antibiotics, and Supplemental oxygen and monitor respiratory status closely.     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   On IV solumedrol 40 q 8 hrs.  PEG tube   Aware, consult dietitian  Bronchiectasis  Continue home vest therapy.   Sepsis  This patient does have evidence of infective focus  My overall impression is sepsis.  Source: Respiratory  Antibiotics given-   Antibiotics (72h ago, onward)      Start     Stop Route Frequency Ordered    03/16/25 0945  metronidazole IVPB 500 mg         -- IV Every 8 hours (non-standard times) 03/16/25 0842    03/14/25 0915  ceFEPIme injection 2 g         -- IV Every 8 hours (non-standard times) 03/14/25 0803          Latest lactate reviewed-  No results for input(s): "LACTATE", "POCLAC" in the last 72 hours.    Organ dysfunction indicated by Acute respiratory failure    Fluid challenge we will give fluids    Post- resuscitation assessment Yes - I attest a sepsis perfusion exam was performed within 6 hours of sepsis, severe sepsis, or septic shock presentation, following fluid resuscitation.      Will Not start Pressors- Levophed for MAP of 65  Source control achieved " by:     MRSA negative.   Discontinue vancomycin.   CTA of the chest reviewed, no significant pneumonia.  UA negative.   CT abdomen and pelvis results reviewed.  Continue cefepime and   Metronidazole  Possible Community acquired pneumonia  Patient has a diagnosis of pneumonia. The cause of the pneumonia is suspected to be bacterial in etiology but organism is not known. The pneumonia is stable. The patient has the following signs/symptoms of pneumonia: persistent hypoxia  and cough. The patient does have a current oxygen requirement and the patient does have a home oxygen requirement. I have reviewed the pertinent imaging. The following cultures have been collected: Blood cultures and Sputum culture The culture results are listed below.     Current antimicrobial regimen consists of the antibiotics listed below. Will monitor patient closely and continue current treatment plan unchanged.   Follow Pulmonary Medicine recommendations.  Antibiotics (From admission, onward)      Start     Stop Route Frequency Ordered    03/16/25 0945  metronidazole IVPB 500 mg         -- IV Every 8 hours (non-standard times) 03/16/25 0842    03/14/25 0915  ceFEPIme injection 2 g         -- IV Every 8 hours (non-standard times) 03/14/25 0803            Microbiology Results (last 7 days)       Procedure Component Value Units Date/Time    Culture, Respiratory with Gram Stain [[id number]]  (Abnormal) Collected: 03/17/25 0753    Order Status: Completed Specimen: Respiratory from Sputum, Expectorated Updated: 03/18/25 0704     Respiratory Culture GRAM NEGATIVE LYNN, NON-LACTOSE   Moderate  Identification and susceptibility pending       Gram Stain (Respiratory) >10 epithelial cells per low power field     Gram Stain (Respiratory) Many WBC's     Gram Stain (Respiratory) Moderate Gram negative rods    Blood culture x two cultures. Draw prior to antibiotics [[id number]] Collected: 03/13/25 2869    Order Status: Completed Specimen: Blood from  Peripheral, Hand, Right Updated: 03/18/25 0232     Blood Culture, Routine No Growth to date      No Growth to date      No Growth to date      No Growth to date      No Growth to date    Narrative:      Aerobic and anaerobic    Blood culture x two cultures. Draw prior to antibiotics [[id number]] Collected: 03/14/25 0653    Order Status: Completed Specimen: Blood Updated: 03/17/25 0832     Blood Culture, Routine No Growth to date      No Growth to date      No Growth to date      No Growth to date    Narrative:      Aerobic and anaerobic  Collection has been rescheduled by MYB at 03/14/2025 00:30 Reason:   Unable to collect 2nd set. Nurse Notified  Collection has been rescheduled by MYB at 03/14/2025 00:30 Reason:   Unable to collect 2nd set. Nurse Notified    MRSA Screen by PCR [[id number]] Collected: 03/14/25 1343    Order Status: Completed Specimen: Nasal Swab Updated: 03/14/25 1651     MRSA SCREEN BY PCR Not Detected    Respiratory Infection Panel (PCR), Nasopharyngeal [[id number]] Collected: 03/14/25 0902    Order Status: Completed Specimen: Nasopharyngeal Swab Updated: 03/14/25 1103     Respiratory Infection Panel Source NP swab     Adenovirus Not Detected     Coronavirus 229E, Common Cold Virus Not Detected     Coronavirus HKU1, Common Cold Virus Not Detected     Coronavirus NL63, Common Cold Virus Not Detected     Coronavirus OC43, Common Cold Virus Not Detected     Comment: Coronavirus strains 229E, HKU1, NL63, and OC43 can cause the common   cold   and are not associated with the respiratory disease outbreak caused   by  the COVID-19 (SARS-CoV-2 novel Coronavirus) strain.           SARS-CoV2 (COVID-19) Qualitative PCR Not Detected     Human Metapneumovirus Not Detected     Human Rhinovirus/Enterovirus Not Detected     Influenza A Not Detected     Influenza B Not Detected     Parainfluenza Virus 1 Not Detected     Parainfluenza Virus 2 Not Detected     Parainfluenza Virus 3 Not Detected     Parainfluenza  Virus 4 Not Detected     Respiratory Syncytial Virus Not Detected     Bordetella Parapertussis ([id number]) Not Detected     Bordetella pertussis (ptxP) Not Detected     Chlamydia pneumoniae Not Detected     Mycoplasma pneumoniae Not Detected     Comment: Respiratory Infection Panel testing performed by Multiplex PCR.       Narrative:      Respiratory Infection Panel source->NP Swab            Empiric cefepime azithromycin  Severe malnutrition  Nutrition consulted. Most recent weight and BMI monitored-     Measurements:  Wt Readings from Last 1 Encounters:   03/18/25 49 kg (108 lb 0.4 oz)   Body mass index is 17.98 kg/m².    Patient has been screened and assessed by RD.    Malnutrition Type:  Context:    Level:      Malnutrition Characteristic Summary:       Interventions/Recommendations (treatment strategy):  1.) Will begin Glucerna 1.5-5 cartons daily to provide 1780 kcals, 100gm protein and 900mL of free water. Suggest FWF 100mL after each feed. TF to meet 100% EEN and EPN. 2.) If kitchen out of Glucerna, suggest Diabetisource-6 cans daily to provide 1800 kcals, 90gm protein and 1224mL of free water. Suggest FWF 80mL after each feed. TF to meet 100% EEN and EPN. 3.) RD to perform NFPE at follow up.    Acute on chronic respiratory failure with hypoxia  Patient with Hypoxic Respiratory failure which is Acute on chronic.  he is on home oxygen at 2 LPM. Supplemental oxygen was provided and noted- Oxygen Concentration (%):  [30] 30  Signs/symptoms of respiratory failure include- increased work of breathing, respiratory distress, and use of accessory muscles. Contributing diagnoses includes - COPD and Pneumonia Labs and images were reviewed. Will treat underlying causes and adjust management of respiratory failure as follows  Antibiotics and steroids.   Follow Pulmonary Medicine recommendations.    D/W daughter, bedside nurse and .   Palliative Medicine to discuss goals of care.  VTE Risk Mitigation (From  admission, onward)           Ordered     enoxaparin injection 40 mg  Daily         03/14/25 0803     IP VTE HIGH RISK PATIENT  Once         03/14/25 0803     Place sequential compression device  Until discontinued         03/14/25 0803                    Discharge Planning   JENNA: 3/18/2025     Code Status: Full Code   Medical Readiness for Discharge Date: 3/17/2025  Discharge Plan A: Home   Discharge Delays: None known at this time            Please place Justification for DME        Zoey Copeland MD  Department of Hospital Medicine   Formerly [facility], [facility]

## 2025-03-18 NOTE — ASSESSMENT & PLAN NOTE
Aware, patient has chronic PEG tube feeding.  Nothing by mouth strictly.   PEG feedings as per nutritionist recommendations.  Patient has chronic dysphagia/ dysarthria.

## 2025-03-18 NOTE — ASSESSMENT & PLAN NOTE
Nutrition consulted. Most recent weight and BMI monitored-     Measurements:  Wt Readings from Last 1 Encounters:   03/18/25 49 kg (108 lb 0.4 oz)   Body mass index is 17.98 kg/m².    Patient has been screened and assessed by RD.    Malnutrition Type:  Context:    Level:      Malnutrition Characteristic Summary:       Interventions/Recommendations (treatment strategy):  1.) Will begin Glucerna 1.5-5 cartons daily to provide 1780 kcals, 100gm protein and 900mL of free water. Suggest FWF 100mL after each feed. TF to meet 100% EEN and EPN. 2.) If kitchen out of Glucerna, suggest Diabetisource-6 cans daily to provide 1800 kcals, 90gm protein and 1224mL of free water. Suggest FWF 80mL after each feed. TF to meet 100% EEN and EPN. 3.) RD to perform NFPE at follow up.

## 2025-03-19 PROBLEM — Z51.5 PALLIATIVE CARE BY SPECIALIST: Status: ACTIVE | Noted: 2025-03-19

## 2025-03-19 PROBLEM — Z71.89 ACP (ADVANCE CARE PLANNING): Status: ACTIVE | Noted: 2025-03-19

## 2025-03-19 PROBLEM — Z71.89 GOALS OF CARE, COUNSELING/DISCUSSION: Status: ACTIVE | Noted: 2025-03-19

## 2025-03-19 LAB
ALBUMIN SERPL BCP-MCNC: 2.5 G/DL (ref 3.5–5.2)
ALLENS TEST: ABNORMAL
ALP SERPL-CCNC: 91 U/L (ref 55–135)
ALT SERPL W/O P-5'-P-CCNC: 29 U/L (ref 10–44)
ANION GAP SERPL CALC-SCNC: 10 MMOL/L (ref 8–16)
ANISOCYTOSIS BLD QL SMEAR: SLIGHT
AST SERPL-CCNC: 25 U/L (ref 10–40)
BACTERIA BLD CULT: NORMAL
BACTERIA BLD CULT: NORMAL
BACTERIA SPEC AEROBE CULT: ABNORMAL
BASOPHILS # BLD AUTO: ABNORMAL K/UL (ref 0–0.2)
BASOPHILS NFR BLD: 0 % (ref 0–1.9)
BILIRUB SERPL-MCNC: 0.5 MG/DL (ref 0.1–1)
BUN SERPL-MCNC: 49 MG/DL (ref 8–23)
CALCIUM SERPL-MCNC: 8.5 MG/DL (ref 8.7–10.5)
CHLORIDE SERPL-SCNC: 109 MMOL/L (ref 95–110)
CO2 SERPL-SCNC: 28 MMOL/L (ref 23–29)
CREAT SERPL-MCNC: 0.9 MG/DL (ref 0.5–1.4)
DELSYS: ABNORMAL
DIFFERENTIAL METHOD BLD: ABNORMAL
EOSINOPHIL # BLD AUTO: ABNORMAL K/UL (ref 0–0.5)
EOSINOPHIL NFR BLD: 0 % (ref 0–8)
EP: 5
ERYTHROCYTE [DISTWIDTH] IN BLOOD BY AUTOMATED COUNT: 14.7 % (ref 11.5–14.5)
ERYTHROCYTE [SEDIMENTATION RATE] IN BLOOD BY WESTERGREN METHOD: 16 MM/H
EST. GFR  (NO RACE VARIABLE): >60 ML/MIN/1.73 M^2
FIO2: 30
GLUCOSE SERPL-MCNC: 112 MG/DL (ref 70–110)
GLUCOSE SERPL-MCNC: 142 MG/DL (ref 70–110)
GRAM STN SPEC: ABNORMAL
HCO3 UR-SCNC: 28.9 MMOL/L (ref 24–28)
HCT VFR BLD AUTO: 50.1 % (ref 40–54)
HCT VFR BLD CALC: 47 %PCV (ref 36–54)
HGB BLD-MCNC: 15 G/DL (ref 14–18)
IMM GRANULOCYTES # BLD AUTO: ABNORMAL K/UL (ref 0–0.04)
IMM GRANULOCYTES NFR BLD AUTO: ABNORMAL % (ref 0–0.5)
IP: 10
LYMPHOCYTES # BLD AUTO: ABNORMAL K/UL (ref 1–4.8)
LYMPHOCYTES NFR BLD: 1 % (ref 18–48)
MAGNESIUM SERPL-MCNC: 2.7 MG/DL (ref 1.6–2.6)
MCH RBC QN AUTO: 28.7 PG (ref 27–31)
MCHC RBC AUTO-ENTMCNC: 29.9 G/DL (ref 32–36)
MCV RBC AUTO: 96 FL (ref 82–98)
MODE: ABNORMAL
MONOCYTES # BLD AUTO: ABNORMAL K/UL (ref 0.3–1)
MONOCYTES NFR BLD: 0 % (ref 4–15)
NEUTROPHILS NFR BLD: 76 % (ref 38–73)
NEUTS BAND NFR BLD MANUAL: 23 %
NRBC BLD-RTO: 0 /100 WBC
PCO2 BLDA: 52 MMHG (ref 35–45)
PH SMN: 7.35 [PH] (ref 7.35–7.45)
PHOSPHATE SERPL-MCNC: 4.1 MG/DL (ref 2.7–4.5)
PLATELET # BLD AUTO: 321 K/UL (ref 150–450)
PLATELET BLD QL SMEAR: ABNORMAL
PMV BLD AUTO: 10.8 FL (ref 9.2–12.9)
PO2 BLDA: 83 MMHG (ref 80–100)
POC BE: 3 MMOL/L
POC IONIZED CALCIUM: 1.18 MMOL/L (ref 1.06–1.42)
POC SATURATED O2: 95 % (ref 95–100)
POC TCO2: 30 MMOL/L (ref 23–27)
POTASSIUM BLD-SCNC: 4.3 MMOL/L (ref 3.5–5.1)
POTASSIUM SERPL-SCNC: 4.6 MMOL/L (ref 3.5–5.1)
PROT SERPL-MCNC: 5.1 G/DL (ref 6–8.4)
RBC # BLD AUTO: 5.23 M/UL (ref 4.6–6.2)
SAMPLE: ABNORMAL
SITE: ABNORMAL
SODIUM BLD-SCNC: 146 MMOL/L (ref 136–145)
SODIUM SERPL-SCNC: 147 MMOL/L (ref 136–145)
SPONT RATE: 21
WBC # BLD AUTO: 50.28 K/UL (ref 3.9–12.7)

## 2025-03-19 PROCEDURE — 27100171 HC OXYGEN HIGH FLOW UP TO 24 HOURS

## 2025-03-19 PROCEDURE — 84295 ASSAY OF SERUM SODIUM: CPT

## 2025-03-19 PROCEDURE — 63600175 PHARM REV CODE 636 W HCPCS: Performed by: FAMILY MEDICINE

## 2025-03-19 PROCEDURE — 99233 SBSQ HOSP IP/OBS HIGH 50: CPT | Mod: ,,, | Performed by: INTERNAL MEDICINE

## 2025-03-19 PROCEDURE — 25000003 PHARM REV CODE 250: Performed by: INTERNAL MEDICINE

## 2025-03-19 PROCEDURE — 36415 COLL VENOUS BLD VENIPUNCTURE: CPT | Performed by: HOSPITALIST

## 2025-03-19 PROCEDURE — 84132 ASSAY OF SERUM POTASSIUM: CPT

## 2025-03-19 PROCEDURE — 94640 AIRWAY INHALATION TREATMENT: CPT

## 2025-03-19 PROCEDURE — 94660 CPAP INITIATION&MGMT: CPT

## 2025-03-19 PROCEDURE — 82330 ASSAY OF CALCIUM: CPT

## 2025-03-19 PROCEDURE — 25000242 PHARM REV CODE 250 ALT 637 W/ HCPCS: Performed by: HOSPITALIST

## 2025-03-19 PROCEDURE — 63600175 PHARM REV CODE 636 W HCPCS: Performed by: HOSPITALIST

## 2025-03-19 PROCEDURE — 99900031 HC PATIENT EDUCATION (STAT)

## 2025-03-19 PROCEDURE — 36600 WITHDRAWAL OF ARTERIAL BLOOD: CPT

## 2025-03-19 PROCEDURE — 21000000 HC CCU ICU ROOM CHARGE

## 2025-03-19 PROCEDURE — 85014 HEMATOCRIT: CPT

## 2025-03-19 PROCEDURE — 25000003 PHARM REV CODE 250: Performed by: STUDENT IN AN ORGANIZED HEALTH CARE EDUCATION/TRAINING PROGRAM

## 2025-03-19 PROCEDURE — 27000221 HC OXYGEN, UP TO 24 HOURS

## 2025-03-19 PROCEDURE — 94761 N-INVAS EAR/PLS OXIMETRY MLT: CPT

## 2025-03-19 PROCEDURE — 82803 BLOOD GASES ANY COMBINATION: CPT

## 2025-03-19 PROCEDURE — 83735 ASSAY OF MAGNESIUM: CPT | Performed by: HOSPITALIST

## 2025-03-19 PROCEDURE — 85007 BL SMEAR W/DIFF WBC COUNT: CPT | Performed by: HOSPITALIST

## 2025-03-19 PROCEDURE — 84100 ASSAY OF PHOSPHORUS: CPT | Performed by: HOSPITALIST

## 2025-03-19 PROCEDURE — 99223 1ST HOSP IP/OBS HIGH 75: CPT | Mod: ,,, | Performed by: STUDENT IN AN ORGANIZED HEALTH CARE EDUCATION/TRAINING PROGRAM

## 2025-03-19 PROCEDURE — 25000003 PHARM REV CODE 250: Performed by: HOSPITALIST

## 2025-03-19 PROCEDURE — 85027 COMPLETE CBC AUTOMATED: CPT | Performed by: HOSPITALIST

## 2025-03-19 PROCEDURE — 99900035 HC TECH TIME PER 15 MIN (STAT)

## 2025-03-19 PROCEDURE — 63600175 PHARM REV CODE 636 W HCPCS: Performed by: INTERNAL MEDICINE

## 2025-03-19 PROCEDURE — 80053 COMPREHEN METABOLIC PANEL: CPT | Performed by: HOSPITALIST

## 2025-03-19 RX ORDER — CHLORHEXIDINE GLUCONATE ORAL RINSE 1.2 MG/ML
15 SOLUTION DENTAL 2 TIMES DAILY
Status: DISCONTINUED | OUTPATIENT
Start: 2025-03-19 | End: 2025-03-20

## 2025-03-19 RX ORDER — CEFEPIME HYDROCHLORIDE 2 G/1
2 INJECTION, POWDER, FOR SOLUTION INTRAVENOUS
Status: DISCONTINUED | OUTPATIENT
Start: 2025-03-19 | End: 2025-03-20 | Stop reason: HOSPADM

## 2025-03-19 RX ORDER — SODIUM CHLORIDE 450 MG/100ML
INJECTION, SOLUTION INTRAVENOUS CONTINUOUS
Status: DISCONTINUED | OUTPATIENT
Start: 2025-03-19 | End: 2025-03-20 | Stop reason: HOSPADM

## 2025-03-19 RX ADMIN — ARFORMOTEROL TARTRATE 15 MCG: 15 SOLUTION RESPIRATORY (INHALATION) at 07:03

## 2025-03-19 RX ADMIN — CEFEPIME 2 G: 2 INJECTION, POWDER, FOR SOLUTION INTRAVENOUS at 05:03

## 2025-03-19 RX ADMIN — IPRATROPIUM BROMIDE AND ALBUTEROL SULFATE 3 ML: .5; 3 SOLUTION RESPIRATORY (INHALATION) at 02:03

## 2025-03-19 RX ADMIN — LEVOTHYROXINE SODIUM 125 MCG: 0.1 TABLET ORAL at 09:03

## 2025-03-19 RX ADMIN — SODIUM CHLORIDE SOLN NEBU 3% 4 ML: 3 NEBU SOLN at 07:03

## 2025-03-19 RX ADMIN — ARFORMOTEROL TARTRATE 15 MCG: 15 SOLUTION RESPIRATORY (INHALATION) at 06:03

## 2025-03-19 RX ADMIN — METHYLPREDNISOLONE SODIUM SUCCINATE 40 MG: 40 INJECTION, POWDER, FOR SOLUTION INTRAMUSCULAR; INTRAVENOUS at 02:03

## 2025-03-19 RX ADMIN — SODIUM CHLORIDE SOLN NEBU 3% 4 ML: 3 NEBU SOLN at 06:03

## 2025-03-19 RX ADMIN — AMITRIPTYLINE HYDROCHLORIDE 50 MG: 25 TABLET, FILM COATED ORAL at 09:03

## 2025-03-19 RX ADMIN — ROFLUMILAST 500 MCG: 500 TABLET ORAL at 07:03

## 2025-03-19 RX ADMIN — MORPHINE SULFATE 2 MG: 2 INJECTION, SOLUTION INTRAMUSCULAR; INTRAVENOUS at 10:03

## 2025-03-19 RX ADMIN — OXYCODONE HYDROCHLORIDE 20 MG: 10 TABLET ORAL at 07:03

## 2025-03-19 RX ADMIN — METHYLPREDNISOLONE SODIUM SUCCINATE 40 MG: 40 INJECTION, POWDER, FOR SOLUTION INTRAMUSCULAR; INTRAVENOUS at 06:03

## 2025-03-19 RX ADMIN — IPRATROPIUM BROMIDE AND ALBUTEROL SULFATE 3 ML: .5; 3 SOLUTION RESPIRATORY (INHALATION) at 10:03

## 2025-03-19 RX ADMIN — CEFEPIME 2 G: 2 INJECTION, POWDER, FOR SOLUTION INTRAVENOUS at 06:03

## 2025-03-19 RX ADMIN — BUDESONIDE INHALATION 1 MG: 0.5 SUSPENSION RESPIRATORY (INHALATION) at 06:03

## 2025-03-19 RX ADMIN — METRONIDAZOLE 500 MG: 500 INJECTION, SOLUTION INTRAVENOUS at 09:03

## 2025-03-19 RX ADMIN — ENOXAPARIN SODIUM 40 MG: 40 INJECTION SUBCUTANEOUS at 05:03

## 2025-03-19 RX ADMIN — IPRATROPIUM BROMIDE AND ALBUTEROL SULFATE 3 ML: .5; 3 SOLUTION RESPIRATORY (INHALATION) at 06:03

## 2025-03-19 RX ADMIN — IPRATROPIUM BROMIDE AND ALBUTEROL SULFATE 3 ML: .5; 3 SOLUTION RESPIRATORY (INHALATION) at 07:03

## 2025-03-19 RX ADMIN — CHLORHEXIDINE GLUCONATE 0.12% ORAL RINSE 15 ML: 1.2 LIQUID ORAL at 09:03

## 2025-03-19 RX ADMIN — SODIUM CHLORIDE: 0.45 INJECTION, SOLUTION INTRAVENOUS at 03:03

## 2025-03-19 RX ADMIN — METHYLPREDNISOLONE SODIUM SUCCINATE 40 MG: 40 INJECTION, POWDER, FOR SOLUTION INTRAMUSCULAR; INTRAVENOUS at 09:03

## 2025-03-19 RX ADMIN — BUDESONIDE INHALATION 1 MG: 0.5 SUSPENSION RESPIRATORY (INHALATION) at 07:03

## 2025-03-19 RX ADMIN — METRONIDAZOLE 500 MG: 500 INJECTION, SOLUTION INTRAVENOUS at 03:03

## 2025-03-19 RX ADMIN — METRONIDAZOLE 500 MG: 500 INJECTION, SOLUTION INTRAVENOUS at 06:03

## 2025-03-19 RX ADMIN — MORPHINE SULFATE 2 MG: 2 INJECTION, SOLUTION INTRAMUSCULAR; INTRAVENOUS at 09:03

## 2025-03-19 NOTE — CARE UPDATE
03/19/25 0653   Patient Assessment/Suction   Level of Consciousness (AVPU) alert   Respiratory Effort Normal;Unlabored   All Lung Fields Breath Sounds Anterior:;Lateral:;diminished;coarse   Rhythm/Pattern, Respiratory unlabored;pattern regular;depth regular   Cough Type no productive sputum   Skin Integrity   $ Wound Care Tech Time 15 min   Area Observed Left;Right;Behind ear;Nares   Skin Appearance without discoloration   PRE-TX-O2   Device (Oxygen Therapy) nasal cannula with humidification   $ Is the patient on Low Flow Oxygen? Yes   Flow (L/min) (Oxygen Therapy) 3   SpO2 (!) 94 %   Pulse Oximetry Type Continuous   $ Pulse Oximetry - Multiple Charge Pulse Oximetry - Multiple   Pulse (!) 116   Resp 16   Aerosol Therapy   $ Aerosol Therapy Charges Aerosol Treatment   Daily Review of Necessity (SVN) completed   Respiratory Treatment Status (SVN) given   Treatment Route (SVN) mask;oxygen   Patient Position HOB elevated   Post Treatment Assessment (SVN) breath sounds improved   Signs of Intolerance (SVN) none   Breath Sounds Post-Respiratory Treatment   Throughout All Fields Post-Treatment All Fields   Throughout All Fields Post-Treatment aeration increased   Post-treatment Heart Rate (beats/min) 116   Post-treatment Resp Rate (breaths/min) 18   High Frequency Chest Compression Vest   $ Chest Compression Charges Vest - Equipment  (abram vest)   Daily Review of Necessity (HFCCV) completed   Vest Type (HFCCV) chest wrap vest   Frequency Type (HFCCV) single frequency   Single Frequency (Hz) 8   Pressure (cm H20) 5   Duration (HFCCV) 30 mins   Patient Position (HFCCV) HOB elevated   Post Treatment Assessment (HFCCV) breath sounds unchanged   Signs of Intolerance (HFCCV) none   Education   $ Education Bronchodilator;15 min

## 2025-03-19 NOTE — ASSESSMENT & PLAN NOTE
Patient with Hypoxic Respiratory failure which is Acute on chronic.  he is on home oxygen at 2 LPM. Supplemental oxygen was provided and noted- Oxygen Concentration (%):  [30] 30  Signs/symptoms of respiratory failure include- increased work of breathing, respiratory distress, and use of accessory muscles. Contributing diagnoses includes - COPD and Pneumonia Labs and images were reviewed. Will treat underlying causes and adjust management of respiratory failure as follows  Antibiotics and steroids.   Follow Pulmonary Medicine recommendations.    D/W daughter, bedside nurse and .

## 2025-03-19 NOTE — CONSULTS
[facility]  Palliative Medicine  Consult Note    Patient Name: Reji Romano  MRN: [id number]  Admission Date: 3/13/2025  Hospital Length of Stay: 5 days  Code Status: Full Code   Attending Provider: Zoey Copeland MD  Consulting Provider: Pascual Morrison MD  Primary Care Physician: Zulema Gutierrez  Principal Problem:Acute on chronic respiratory failure with hypoxia    Patient information was obtained from patient, spouse/SO, past medical records, and primary team.      Inpatient consult to Palliative Care  Consult performed by: Pascual Morrison MD  Consult ordered by: Zoey Copeland MD        Assessment/Plan:     Palliative Care  ACP (advance care planning)  Advance Care Planning    Date: 03/19/2025    Code Status  In light of the patients advanced and life limiting illness,I engaged the the patient and family in a voluntary conversation about the patient's preferences for care  at the very end of life. The patient does not wish to have a natural, peaceful death.  Along those lines, the patient does wish to have CPR or other invasive treatments performed when his heart and/or breathing stops.  A full code status will be maintained      Hospice  I did explain the role for hospice care at this stage of the patient's illness, including its ability to help the patient live with the best quality of life possible.  We will not be making a hospice referral.          Goals of care, counseling/discussion  I reviewed the patient's chart and discussed the case with the patient's team.      I examined Reji Romano at bedside.  The patient maintains capacity for complex medical decision-making.  I spoke with him and his wife at bedside.    I introduced myself and my role as palliative care physician. They were agreeable to speaking.    We discussed the patient's medical illness, prognosis, and values in detail.  Below is a brief summary of our discussion.    He understands he is admitted and being  treated for acute on chronic respiratory failure likely related to COPD exacerbation and pneumonia.  He understands his condition to be critical.  I affirmed his understanding.    We discussed his prognosis.  I explained that he is on trajectory that could be easily lead to death.  Fortunately, his condition seems to be improving during our visit today.  If he is able to continue on this trajectory I suspect he will require many weeks of recovery ahead.  If he can regain his functional status and find a way to maintain adequate nutrition I hope he can live many more good months; given his severe malnutrition and end-stage COPD I will be surprised if he is living a year from now.  I also fully explained he remains at risk of complications that could easily become life-threatening a deadly during this hospitalization.  They understood.  They were not surprised by any of this news.    We discussed his values.  He is living a reasonable enough quality of life at home.  As long as he can maintain adequate pain control and get some sleep he can find quality in life.  He is hopeful to spend more time at home with his wife.  He does not have any particular worries or fears.  He is willing to continue with further hospitalization hopes of getting better.    We spoke back and forth.  Answered all questions to the best of my ability.    At this point, his goals are aligned with current plan of care.  They remain hopeful for the best.  I recommended being prepared for the worst.  They understood.    In preparing for the worst, we did discuss hospice in the philosophy of hospice care.  Today, his goals are not aligned with hospice.  He is open to the concept of his condition does not improve.    We also discuss code status.  He initially told me he would not wish for attempts at resuscitation.  After further discussing with his wife, they elected to maintain a FULL code status with the understanding that he would not wish to be  "prolonged on artificial life support indefinitely.    I appreciate being involved.  I hope I have been helpful.          Thank you for your consult. I will follow-up with patient. Please contact us if you have any additional questions.    Subjective:     HPI:   Per admit H&P: "This is a 70-year-old  male with a history of severe COPD, chronic hypoxic respiratory failure on 2 L nasal cannula 24/7, severe cachexia with a BMI 18.3,  throat cancer status post chemoradiation with chronic dysphagia on PEG tube feeding presenting here for worsened shortness of breaths for past few days.  Also reported may have altered mental status, however patient is awake alert when I evaluated him.  Patient has severe dysarthria secondary to previous throat cancer, history is obtained from wife at bedside.  Patient apparently has been feel unwell for few days, no sick contacts.  No chest pain.  No fever or chills.  Patient has PEG tube with every feeding/ medication through this.  Patient has is absolutely nothing by mouth.  Patient also has a chest vest on 3% normal saline nebulizer at home.   In the ER, patient is afebrile, hemodynamically stable.  WBC count is 40 K. patient did not received any steroids prior to this as per wife."    He is currently admitted and being treated for acute on chronic respiratory failure secondary to COPD exacerbation complicated by pneumonia.  He is critically ill with a guarded prognosis.  Given his cachexia, debility, and end-stage underlying COPD his prognosis going forward long-term appears to be quite poor.  I have been asked to assist with goals of care.    Hospital Course:  No notes on file    Interval History:  See HPI    History reviewed. No pertinent past medical history.    History reviewed. No pertinent surgical history.    Review of patient's allergies indicates:   Allergen Reactions    Propoxyphene Anaphylaxis       Medications:  Continuous Infusions:  Scheduled Meds:   " albuterol-ipratropium  3 mL Nebulization Q4H WAKE    amitriptyline  50 mg Oral QHS    arformoteroL  15 mcg Nebulization BID    budesonide  1 mg Nebulization Q12H    ceFEPime IV (PEDS and ADULTS)  2 g Intravenous Q8H    enoxparin  40 mg Subcutaneous Daily    levothyroxine  125 mcg Oral QHS    methylPREDNISolone injection (PEDS and ADULTS)  40 mg Intravenous Q8H    metroNIDAZOLE IV (PEDS and ADULTS)  500 mg Intravenous Q8H    roflumilast  500 mcg Oral Daily    sodium chloride 3%  4 mL Nebulization BID     PRN Meds:  Current Facility-Administered Medications:     hydrOXYzine HCL, 25 mg, Oral, BID PRN    morphine, 2 mg, Intravenous, Q4H PRN    ondansetron, 4 mg, Intravenous, Q12H PRN    oxyCODONE, 20 mg, Oral, TID PRN    Flushing PICC/Midline Protocol, , , Until Discontinued **AND** sodium chloride 0.9%, 10 mL, Intravenous, Q12H PRN    sodium chloride 0.9%, 3 mL, Intravenous, PRN    Family History    None       Tobacco Use    Smoking status: Former     Types: Cigarettes    Smokeless tobacco: Not on file   Substance and Sexual Activity    Alcohol use: Not Currently    Drug use: Not Currently    Sexual activity: Not Currently       Review of Systems  Objective:     Vital Signs (Most Recent):  Temp: 97.4 °F (36.3 °C) (03/18/25 0300)  Pulse: (!) 117 (03/18/25 1120)  Resp: 20 (03/18/25 1452)  BP: 103/71 (03/18/25 0800)  SpO2: 98 % (03/18/25 1120) Vital Signs (24h Range):  Temp:  [96.8 °F (36 °C)-97.4 °F (36.3 °C)] 97.4 °F (36.3 °C)  Pulse:  [] 117  Resp:  [18-48] 20  SpO2:  [70 %-98 %] 98 %  BP: ()/(65-93) 103/71     Weight: 49 kg (108 lb 0.4 oz)  Body mass index is 17.98 kg/m².       Physical Exam  Vitals reviewed.   Constitutional:       General: He is not in acute distress.     Appearance: He is ill-appearing.   HENT:      Head: Normocephalic and atraumatic.      Right Ear: External ear normal.      Left Ear: External ear normal.      Nose: Nose normal. No congestion.      Mouth/Throat:      Mouth: Mucous  membranes are moist.      Pharynx: No oropharyngeal exudate.   Eyes:      General:         Right eye: No discharge.         Left eye: No discharge.   Cardiovascular:      Rate and Rhythm: Tachycardia present.   Pulmonary:      Effort: Pulmonary effort is normal. No respiratory distress.      Comments: On nasal cannula oxygen  Abdominal:      General: There is no distension.      Palpations: Abdomen is soft.      Tenderness: There is no abdominal tenderness.   Neurological:      General: No focal deficit present.      Mental Status: He is alert. Mental status is at baseline.      Comments: Severe dysarthria   Psychiatric:      Comments: Mood, behavior, thought content and judgment appear to be appropriate; difficult to fully assess due to severe dysarthria            Review of Symptoms      Symptom Assessment (ESAS 0-10 Scale)  Pain:  0  Dyspnea:  4  Anxiety:  2  Nausea:  0  Depression:  0  Anorexia:  2  Fatigue:  6  Insomnia:  0  Restlessness:  0  Agitation:  0         Performance Status:  20    Psychosocial/Cultural:   See Palliative Psychosocial Note: No  **Primary  to Follow**  Palliative Care  Consult: No        Advance Care Planning  Advance Directives:     Decision Making:  Patient answered questions and Family answered questions  Goals of Care: The patient and family endorses that what is most important right now is to focus on spending time at home, avoiding the hospital, remaining as independent as possible, symptom/pain control, and improvement in condition but with limits to invasive therapies    Accordingly, we have decided that the best plan to meet the patient's goals includes continuing with treatment         Significant Labs: BMP:   Recent Labs   Lab 03/18/25  0942   *   *   K 4.1      CO2 33*   BUN 37*   CREATININE 0.8   CALCIUM 8.4*   MG 2.3     CBC:   Recent Labs   Lab 03/17/25  0322 03/17/25  1453 03/18/25  0630   WBC 25.73*  --  38.13*   HGB 12.3*  --   15.1   HCT 40.1 46 49.7     --  229     CBC:   Recent Labs   Lab 03/18/25  0630   WBC 38.13*   HGB 15.1   HCT 49.7   MCV 95        BMP:  Recent Labs   Lab 03/18/25  0942   *   *   K 4.1      CO2 33*   BUN 37*   CREATININE 0.8   CALCIUM 8.4*   MG 2.3     LFT:  Lab Results   Component Value Date    AST 18 03/18/2025    ALKPHOS 81 03/18/2025    BILITOT 0.5 03/18/2025     Albumin:   Albumin   Date Value Ref Range Status   03/18/2025 2.6 (L) 3.5 - 5.2 g/dL Final     Protein:   Total Protein   Date Value Ref Range Status   03/18/2025 5.1 (L) 6.0 - 8.4 g/dL Final     Lactic acid:   Lab Results   Component Value Date    LACTATE 1.5 03/14/2025    LACTATE 1.5 06/04/2023       Significant Imaging: I have reviewed all pertinent imaging results/findings within the past 24 hours.      I spent a total of 75 minutes on the day of the visit. This includes face to face time in discussion of goals of care, symptom assessment, coordination of care and emotional support.  This also includes non-face to face time preparing to see the patient (eg, review of tests/imaging), obtaining and/or reviewing separately obtained history, documenting clinical information in the electronic or other health record, independently interpreting results and communicating results to the patient/family/caregiver, or care coordinator.    An additional 25 minutes was specifically spent in advance care planning.    Pascual Morrison MD  Palliative Medicine  [facility]

## 2025-03-19 NOTE — CONSULTS
"[facility]   Department of Infectious Disease  Consult Note        PATIENT NAME: Reji Romano  MRN: [id number]  TODAY'S DATE: 03/19/2025  ADMIT DATE: 3/13/2025  LOS: 5 days    CHIEF COMPLAINT: Shortness of Breath (Patient O2 dependent of 2L/NC at home. Patient c/o increased shortness of breath) and Altered Mental Status (Patient wife states that patient has not been acting like himself and has been "mariya out of it" x 2 days)      PRINCIPLE PROBLEM: Acute on chronic respiratory failure with hypoxia    REASON FOR CONSULT:  Persistent leukemoid reaction    ASSESSMENT and PLAN     Persistent leukocytosis multifactorial, currently on steroids vs in the setting of complicated history from throat cancer status post chemotherapy and radiation, chronic dysphagia status post total neck and jaw reconstruction, persistent aspiration pneumonia    Procalcitonin 4, down to 0.5   WBC 50K, bands 23%, PMN 76%, on Solu-Medrol IV q.8   Respiratory culture 3/17 Pseudomonas aeruginosa pan-sensitive   MRSA nasal swab 3/14 negative  RVP 3/14 negative  Blood cultures 3/14 no growth    PMHx: COPD on home O2, bronchiectasis, 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 Mak/Pulmonary     Thank you for this consult. Please send Cannae secure chat with any questions.    Antibiotics (From admission, onward)      Start     Stop Route " Frequency Ordered    03/19/25 1800  ceFEPIme injection 2 g         -- IV Every 12 hours (non-standard times) 03/19/25 1119    03/16/25 0945  metronidazole IVPB 500 mg         -- IV Every 8 hours (non-standard times) 03/16/25 0842          Antifungals (From admission, onward)      None           Antivirals (From admission, onward)      None            HPI      Reji Romano is a 70 y.o. 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  Wife at bedside providing most of the history, she mentions since 2017 after chemo radiation patient has been having ongoing chronic aspiration and he has been having copious secretions from mouth getting into his lungs.  Wife mentions the patient's main complaint was persistent cough, chest pain, and productive sputum green thick yellow.  She denies fever, chills or night sweats, she also noticed some shortness of breath that resolved while on oxygen at home.  She is also noticing cyanosis of fingers, knees and toes.  She also mentions her  has lost significant weight since all the surgeries and everything in 2017.  She also mentions he is urinating without difficulties, had a couple loose bowel movements yesterday but so far nothing today.    In the ER, tachypneic, tachycardic, afebrile   Labs on admission, leukocytosis 41.5, bands 36%, PMN 56%, H&H 11.9 over 37.3, platelet count 307  Hyponatremia 131, normal potassium 4.6   Normal kidney and liver function tests   Lactic acid negative   Procalcitonin 4, down to 0.5  MRSA nasal swab negative   Hep C antibody negative   HIV 4th Gen negative   Flu a, B, COVID negative   RVP negative   UA negative for UTI  Chest x-ray bilateral pleural  effusions with adjacent airspace disease or atelectasis, left greater than right.  CTA chest negative for PE, mild bronchiectasis.  Minimal scattered punctate nodules.  CT abdomen/pelvis with IV contrast and PO contrast revealed findings consistent with pancolitis.     Antibiotic history:    Vancomycin 3/13--3/14  Zosyn 3/13 x1   Azithromycin 3/03/14/2015   Cefepime 3/14--  Flagyl 3/16    Microbiology:      Outdoor activities:   Travel:     Implants:     Antibiotic history:       Social History  Marital Status:  as per wife, used to work in multiple things.  Alcohol History:  reports that he does not currently use alcohol.  Tobacco History:  Former very heavy smoker, prior alcohol use  Drug History:  reports that he does not currently use drugs.      Review of patient's allergies indicates:   Allergen Reactions    Propoxyphene Anaphylaxis     History reviewed. No pertinent past medical history.  History reviewed. No pertinent surgical history.  No family history on file.    SUBJECTIVE     Review of systems  Constitutional:  Denies fevers, chills, night sweats, PEG tube  HEENT: Denies visual changes, ear pain, sinus congestion, mouth pain or trouble swallowing, sore throat or dental pain.  Neck: Denies neck pain or lumps.  Respiratory: +shortness of breath, +coughing, no wheezing or hemoptysis.  Cardiovascular:  Denies chest pain, palpitations or edema.  Gastrointestinal: Denies  nausea, vomiting, constipation or diarrhea.  Genitourinary:  Denies dysuria, frequency, urgency or hematuria   Musculoskeletal:  Denies joint pain or swelling, difficulty walking.    Skin:  Denies rash or itching.  Cyanosis of tips of fingers, toes and knees bilaterally  Neurologic:  Denies motor or sensory loss, headaches or dizziness.    Psychiatric:  Denies changes in mood or behavior.     OBJECTIVE   Temp:  [97.2 °F (36.2 °C)-98.8 °F (37.1 °C)] 97.5 °F (36.4 °C)  Pulse:  [] 113  Resp:  [16-24] 22  SpO2:  [77 %-100 %] 96  %  BP: ()/(55-87) 146/87  Temp:  [97.2 °F (36.2 °C)-98.8 °F (37.1 °C)]   Temp: 97.5 °F (36.4 °C) (03/19/25 0702)  Pulse: (!) 113 (03/19/25 1200)  Resp: (!) 22 (03/19/25 1200)  BP: (!) 146/87 (03/19/25 1200)  SpO2: 96 % (03/19/25 1200)    Intake/Output Summary (Last 24 hours) at 3/19/2025 1303  Last data filed at 3/19/2025 1233  Gross per 24 hour   Intake 85.7 ml   Output --   Net 85.7 ml       Physical Exam  General:  Very frail cachectic  male contracted lying on his right side on O2 by NC 2 L, saturating 92%  Eyes: Eyes with no icterus or injection. Vision grossly normal  Ears: Hearing grossly normal.  Nose: Nares patent  Mouth:  Status post reconstruction of mandible, big pocket of thick green purulent secretions noted on left side of reconstructed jaw  Neck:  Rigid neck after surgery  Cardiovascular:  Tachycardic, soft systolic murmur+  Respiratory:  Clear to auscultation bilaterally, no tachypnea or increased work of breathing.  Gastrointestinal:  Peg tube in place, soft with active bowel sounds, no tenderness to palpation, no distention.  Genitourinary:  No suprapubic tenderness.  Musculoskeletal:  Generalized muscle weakness  Skin:  Warm and very dry, fingers and toes and niece cyanotic  Neuro:   Somnolent but able to answer to questions, wife helping during conversation  Psych:  Calm  VAD:  Midline left  ISOLATION:  None    Wounds:  N/A    Significant Labs: All pertinent labs within the past 24 hours have been reviewed.    CBC LAST 7  Recent Labs   Lab 03/13/25  2342 03/13/25  2342 03/15/25  0434 03/16/25  0504 03/17/25  0322 03/17/25  1453 03/18/25  0630 03/19/25  0907   WBC 41.54*  --  19.06* 27.44* 25.73*  --  38.13* 50.28*   RBC 4.09*  --  4.63 4.06* 4.31*  --  5.25 5.23   HGB 11.9*  --  13.3* 11.6* 12.3*  --  15.1 15.0   HCT 37.3*  --  43.0 37.7* 40.1 46 49.7 50.1   MCV 91  --  93 93 93  --  95 96   MCH 29.1  --  28.7 28.6 28.5  --  28.8 28.7   MCHC 31.9*  --  30.9* 30.8* 30.7*  --  30.4*  "29.9*   RDW 13.7  --  13.9 14.0 14.2  --  14.6* 14.7*     --  301 358 315  --  229 321   MPV 10.8  --  10.6 10.6 10.8  --  11.2 10.8   GRAN 56.0   < > 95.1*  18.1* 95.8*  26.3* 92.4*  23.8*  --  92.6*  35.3* 76.0*   LYMPH 5.0*   < > 1.9*  0.4* 1.2*  0.3* 1.8*  0.5*  --  2.4*  0.9* 1.0*  CANCELED   MONO 3.0*   < > 1.8*  0.3 2.2*  0.6 4.8  1.2*  --  2.7*  1.0 0.0*  CANCELED   BASO  --   --  0.03 0.03 0.05  --  0.01 CANCELED   NRBC 0  --  0 0 0  --  0 0    < > = values in this interval not displayed.       CHEMISTRY LAST 7  Recent Labs   Lab 03/14/25  0443 03/15/25  0434 03/16/25  0504 03/17/25  0322 03/17/25  1453 03/18/25  0942 03/19/25  0907   * 136 142 144  --  146* 147*   K 4.6 4.8 4.1 4.1  --  4.1 4.6   CL 96 102 107 108  --  106 109   CO2 29 27 28 28  --  33* 28   ANIONGAP 6* 7* 7* 8  --  7* 10   BUN 22 21 22 26*  --  37* 49*   CREATININE 0.8 0.6 0.5 0.5  --  0.8 0.9   * 107 124* 124*  --  133* 112*   CALCIUM 8.6* 9.1 8.7 8.4*  --  8.4* 8.5*   PH  --   --   --   --  7.355  --   --    MG 1.8 2.1 2.2 2.2  --  2.3 2.7*   ALBUMIN 3.0* 3.4* 2.9* 2.5*  --  2.6* 2.5*   PROT 6.2 6.5 5.6* 4.6*  --  5.1* 5.1*   ALKPHOS 90 89 80 73  --  81 91   ALT 23 23 23 20  --  26 29   AST 21 16 15 13  --  18 25   BILITOT 0.4 0.3 0.3 0.3  --  0.5 0.5       Estimated Creatinine Clearance: 53.4 mL/min (based on SCr of 0.9 mg/dL).    INFLAMMATORY/PROCAL  LAST 7  Recent Labs   Lab 03/14/25  1235 03/17/25  0854   PROCAL 4.091* 0.522*     No results found for: "ESR"  C-Reactive Protein   Date Value Ref Range Status   04/22/2020 8.0 (H) <0.9 mg/dL Final   04/17/2020 46.5 (H) <0.9 mg/dL Final   04/16/2020 46.8 (H) <0.9 mg/dL Final       PRIOR  MICROBIOLOGY:    Susceptibility data from last 90 days.  Collected Specimen Info Organism Cefepime Ciprofloxacin Levofloxacin Meropenem PIPERACILLIN/TAZO SUSCEPTIBILITY   03/17/25 Respiratory from Sputum, Expectorated Pseudomonas aeruginosa  S  S  S  S  S   03/14/25 " Blood No growth after 5 days.        03/13/25 Blood from Peripheral, Hand, Right No growth after 5 days.              LAST 7 DAYS MICROBIOLOGY   Microbiology Results (last 7 days)       Procedure Component Value Units Date/Time    Blood culture x two cultures. Draw prior to antibiotics [[id number]] Collected: 03/14/25 0653    Order Status: Completed Specimen: Blood Updated: 03/19/25 0832     Blood Culture, Routine No growth after 5 days.    Narrative:      Aerobic and anaerobic  Collection has been rescheduled by MYB at 03/14/2025 00:30 Reason:   Unable to collect 2nd set. Nurse Notified  Collection has been rescheduled by MYB at 03/14/2025 00:30 Reason:   Unable to collect 2nd set. Nurse Notified    Culture, Respiratory with Gram Stain [[id number]]  (Abnormal)  (Susceptibility) Collected: 03/17/25 0753    Order Status: Completed Specimen: Respiratory from Sputum, Expectorated Updated: 03/19/25 0628     Respiratory Culture PSEUDOMONAS AERUGINOSA  Moderate       Gram Stain (Respiratory) >10 epithelial cells per low power field     Gram Stain (Respiratory) Many WBC's     Gram Stain (Respiratory) Moderate Gram negative rods    Blood culture x two cultures. Draw prior to antibiotics [[id number]] Collected: 03/13/25 2344    Order Status: Completed Specimen: Blood from Peripheral, Hand, Right Updated: 03/19/25 0232     Blood Culture, Routine No growth after 5 days.    Narrative:      Aerobic and anaerobic    MRSA Screen by PCR [[id number]] Collected: 03/14/25 1343    Order Status: Completed Specimen: Nasal Swab Updated: 03/14/25 1651     MRSA SCREEN BY PCR Not Detected    Respiratory Infection Panel (PCR), Nasopharyngeal [[id number]] Collected: 03/14/25 0902    Order Status: Completed Specimen: Nasopharyngeal Swab Updated: 03/14/25 1103     Respiratory Infection Panel Source NP swab     Adenovirus Not Detected     Coronavirus 229E, Common Cold Virus Not Detected     Coronavirus HKU1, Common Cold Virus Not Detected      Coronavirus NL63, Common Cold Virus Not Detected     Coronavirus OC43, Common Cold Virus Not Detected     Comment: Coronavirus strains 229E, HKU1, NL63, and OC43 can cause the common   cold   and are not associated with the respiratory disease outbreak caused   by  the COVID-19 (SARS-CoV-2 novel Coronavirus) strain.           SARS-CoV2 (COVID-19) Qualitative PCR Not Detected     Human Metapneumovirus Not Detected     Human Rhinovirus/Enterovirus Not Detected     Influenza A Not Detected     Influenza B Not Detected     Parainfluenza Virus 1 Not Detected     Parainfluenza Virus 2 Not Detected     Parainfluenza Virus 3 Not Detected     Parainfluenza Virus 4 Not Detected     Respiratory Syncytial Virus Not Detected     Bordetella Parapertussis (US1363) Not Detected     Bordetella pertussis (ptxP) Not Detected     Chlamydia pneumoniae Not Detected     Mycoplasma pneumoniae Not Detected     Comment: Respiratory Infection Panel testing performed by Multiplex PCR.       Narrative:      Respiratory Infection Panel source->NP Swab              CURRENT/PREVIOUS VISIT EKG  Results for orders placed or performed during the hospital encounter of 03/13/25   EKG 12-lead    Collection Time: 03/14/25  6:29 AM   Result Value Ref Range    QRS Duration 80 ms    [facility]HS QTC Calculation 430 ms    Narrative    Test Reason : R06.02,    Vent. Rate : 101 BPM     Atrial Rate : 101 BPM     P-R Int : 118 ms          QRS Dur :  80 ms      QT Int : 332 ms       P-R-T Axes :  83  83  50 degrees    QTcB Int : 430 ms    Sinus tachycardia  Otherwise normal ECG    Confirmed by Leno Weldon ([id number]) on 3/14/2025 6:16:48 PM    Referred By: AAAREFERRAL SELF           Confirmed By: Leno Weldon            Significant Imaging: I have reviewed all relevant and available imaging results/findings within the past 24 hours.      I spent a total of 75 minutes on the day of the visit.This includes face to face time and non-face to face time preparing  to see the patient (eg, review of tests), obtaining and/or reviewing separately obtained history, documenting clinical information in the electronic or other health record, independently interpreting results and communicating results to the patient/family/caregiver, or care coordinator.    Kathryn Resendiz MD  Date of Service: 03/19/2025      This note was created using freee voice recognition software that occasionally misinterpreted phrases or words.

## 2025-03-19 NOTE — ASSESSMENT & PLAN NOTE
Nutrition consulted. Most recent weight and BMI monitored-     Measurements:  Wt Readings from Last 1 Encounters:   03/19/25 48.7 kg (107 lb 5.8 oz)   Body mass index is 17.87 kg/m².    Patient has been screened and assessed by RD.    Malnutrition Type:  Context:    Level:      Malnutrition Characteristic Summary:       Interventions/Recommendations (treatment strategy):  1) Continue current TF regimen of Glucerna 1.5 6 cans daily as before with 90 cc free water flush after each feeding to provide 2133 kcals and 118 g protein. 2)  If kitchen out of Glucerna, suggest Diabetisource-7 cans daily to provide 2100 kcals, 105 gm protein and 1428 mL of free water. Suggest FWF 90mL after each feed. TF to meet 100% EEN and EPN. 3.) RD to perform NFPE at follow up.

## 2025-03-19 NOTE — ASSESSMENT & PLAN NOTE
Aware, patient has chronic PEG tube feeding.  Nothing by mouth strictly.   PEG feedings as per nutritionist recommendations.  Patient has chronic dysphagia/ dysarthria.    17:55

## 2025-03-19 NOTE — PLAN OF CARE
SWCM informed June Escobar that pt continues to require medical care and JENNA. Palliative met with pt, hospice care declined at this time. CM to continue following and get SOC from June Luz.      03/19/25 1116   Discharge Reassessment   Assessment Type Discharge Planning Reassessment   Did the patient's condition or plan change since previous assessment? No   Discharge Plan discussed with: Patient   Communicated JENNA with patient/caregiver Yes   Discharge Plan A Home Health   Discharge Plan B Home Health   Why the patient remains in the hospital Requires continued medical care   Post-Acute Status   Post-Acute Authorization Home Health   Home Health Status Set-up Complete/Auth obtained   Discharge Delays None known at this time

## 2025-03-19 NOTE — SUBJECTIVE & OBJECTIVE
Interval History:  Frail elderly male  Currently resting, on 3 liter/minute supplemental oxygen via nasal cannula.  Patient's daughter is present at bedside.  Palliative Medicine   Following the patient.  Pulmonary medicine closely following.   No further diarrhea reported today. Denies any chest pain or any focal neurological complaints.     Review of Systems   All other systems reviewed and are negative.    Objective:     Vital Signs (Most Recent):  Temp: 98.6 °F (37 °C) (03/19/25 0320)  Pulse: (!) 116 (03/19/25 0656)  Resp: 16 (03/19/25 0653)  BP: 98/68 (03/19/25 0655)  SpO2: (!) 94 % (03/19/25 0653) Vital Signs (24h Range):  Temp:  [97.2 °F (36.2 °C)-98.8 °F (37.1 °C)] 98.6 °F (37 °C)  Pulse:  [] 116  Resp:  [16-25] 16  SpO2:  [77 %-98 %] 94 %  BP: ()/(55-85) 98/68     Weight: 48.7 kg (107 lb 5.8 oz)  Body mass index is 17.87 kg/m².    Intake/Output Summary (Last 24 hours) at 3/19/2025 0730  Last data filed at 3/18/2025 1830  Gross per 24 hour   Intake 1125.7 ml   Output --   Net 1125.7 ml         Physical Exam  Constitutional:       Appearance: He is ill-appearing.   Cardiovascular:      Rate and Rhythm: Normal rate.      Pulses: Normal pulses.   Pulmonary:      Breath sounds: Wheezing and rales present.   Abdominal:      General: There is no distension.      Comments: PEG tube in place   Neurological:      Mental Status: He is oriented to person, place, and time.               Significant Labs:   Lab Results   Component Value Date    WBC 38.13 (HH) 03/18/2025    HGB 15.1 03/18/2025    HCT 49.7 03/18/2025    MCV 95 03/18/2025     03/18/2025       CMP  Sodium   Date Value Ref Range Status   03/18/2025 146 (H) 136 - 145 mmol/L Final     Potassium   Date Value Ref Range Status   03/18/2025 4.1 3.5 - 5.1 mmol/L Final     Chloride   Date Value Ref Range Status   03/18/2025 106 95 - 110 mmol/L Final     CO2   Date Value Ref Range Status   03/18/2025 33 (H) 23 - 29 mmol/L Final     Glucose   Date  Value Ref Range Status   03/18/2025 133 (H) 70 - 110 mg/dL Final     BUN   Date Value Ref Range Status   03/18/2025 37 (H) 8 - 23 mg/dL Final     Creatinine   Date Value Ref Range Status   03/18/2025 0.8 0.5 - 1.4 mg/dL Final     Calcium   Date Value Ref Range Status   03/18/2025 8.4 (L) 8.7 - 10.5 mg/dL Final     Total Protein   Date Value Ref Range Status   03/18/2025 5.1 (L) 6.0 - 8.4 g/dL Final     Albumin   Date Value Ref Range Status   03/18/2025 2.6 (L) 3.5 - 5.2 g/dL Final     Total Bilirubin   Date Value Ref Range Status   03/18/2025 0.5 0.1 - 1.0 mg/dL Final     Comment:     For infants and newborns, interpretation of results should be based  on gestational age, weight and in agreement with clinical  observations.    Premature Infant recommended reference ranges:  Up to 24 hours.............<8.0 mg/dL  Up to 48 hours............<12.0 mg/dL  3-5 days..................<15.0 mg/dL  6-29 days.................<15.0 mg/dL       Alkaline Phosphatase   Date Value Ref Range Status   03/18/2025 81 55 - 135 U/L Final     AST   Date Value Ref Range Status   03/18/2025 18 10 - 40 U/L Final     ALT   Date Value Ref Range Status   03/18/2025 26 10 - 44 U/L Final     Anion Gap   Date Value Ref Range Status   03/18/2025 7 (L) 8 - 16 mmol/L Final     eGFR   Date Value Ref Range Status   03/18/2025 >60.0 >60 mL/min/1.73 m^2 Final     Microbiology Results (last 7 days)       Procedure Component Value Units Date/Time    Culture, Respiratory with Gram Stain [[id number]]  (Abnormal)  (Susceptibility) Collected: 03/17/25 0753    Order Status: Completed Specimen: Respiratory from Sputum, Expectorated Updated: 03/19/25 0628     Respiratory Culture PSEUDOMONAS AERUGINOSA  Moderate       Gram Stain (Respiratory) >10 epithelial cells per low power field     Gram Stain (Respiratory) Many WBC's     Gram Stain (Respiratory) Moderate Gram negative rods    Blood culture x two cultures. Draw prior to antibiotics [[id number]] Collected:  03/13/25 2344    Order Status: Completed Specimen: Blood from Peripheral, Hand, Right Updated: 03/19/25 0232     Blood Culture, Routine No growth after 5 days.    Narrative:      Aerobic and anaerobic    Blood culture x two cultures. Draw prior to antibiotics [[id number]] Collected: 03/14/25 0653    Order Status: Completed Specimen: Blood Updated: 03/18/25 0832     Blood Culture, Routine No Growth to date      No Growth to date      No Growth to date      No Growth to date      No Growth to date    Narrative:      Aerobic and anaerobic  Collection has been rescheduled by MYB at 03/14/2025 00:30 Reason:   Unable to collect 2nd set. Nurse Notified  Collection has been rescheduled by MYB at 03/14/2025 00:30 Reason:   Unable to collect 2nd set. Nurse Notified    MRSA Screen by PCR [[id number]] Collected: 03/14/25 1343    Order Status: Completed Specimen: Nasal Swab Updated: 03/14/25 1651     MRSA SCREEN BY PCR Not Detected    Respiratory Infection Panel (PCR), Nasopharyngeal [[id number]] Collected: 03/14/25 0902    Order Status: Completed Specimen: Nasopharyngeal Swab Updated: 03/14/25 1103     Respiratory Infection Panel Source NP swab     Adenovirus Not Detected     Coronavirus 229E, Common Cold Virus Not Detected     Coronavirus HKU1, Common Cold Virus Not Detected     Coronavirus NL63, Common Cold Virus Not Detected     Coronavirus OC43, Common Cold Virus Not Detected     Comment: Coronavirus strains 229E, HKU1, NL63, and OC43 can cause the common   cold   and are not associated with the respiratory disease outbreak caused   by  the COVID-19 (SARS-CoV-2 novel Coronavirus) strain.           SARS-CoV2 (COVID-19) Qualitative PCR Not Detected     Human Metapneumovirus Not Detected     Human Rhinovirus/Enterovirus Not Detected     Influenza A Not Detected     Influenza B Not Detected     Parainfluenza Virus 1 Not Detected     Parainfluenza Virus 2 Not Detected     Parainfluenza Virus 3 Not Detected     Parainfluenza  Virus 4 Not Detected     Respiratory Syncytial Virus Not Detected     Bordetella Parapertussis (ES2225) Not Detected     Bordetella pertussis (ptxP) Not Detected     Chlamydia pneumoniae Not Detected     Mycoplasma pneumoniae Not Detected     Comment: Respiratory Infection Panel testing performed by Multiplex PCR.       Narrative:      Respiratory Infection Panel source->NP Swab          Significant Imaging:   CT abdomen and Pelvis with IV contrast:  1.  Moderate to severe colonic wall thickening in the ascending and rectosigmoid colon, with mild to moderate colonic wall thickening in the remainder of the colon compatible with pan-colitis (likely infectious/inflammatory, and or neutropenic, and less likely from vasculitis, ischemia or lymphoma), consider correlation with the symptoms, history and attention on follow-up imaging.  2.  Peg tube with balloon inflated in the body of the stomach.  No finding of bowel obstruction, intra-abdominal free air or leak.  3.  Numerous additional, and incidental findings as above.    ECHO:    Left Ventricle: The left ventricle is normal in size. Normal wall thickness. There is normal systolic function with a visually estimated ejection fraction of 65 - 70%. There is normal diastolic function.    Right Ventricle: The right ventricle is normal in size. Systolic function is normal.    IVC/SVC: Normal venous pressure at 3 mmHg.    CXR:  Small bilateral pleural effusions with adjacent airspace disease or atelectasis, left greater than right. Background of chronic interstitial changes. Normal heart size.     CTA Chest:  1. No evidence of pulmonary embolism to the segmental arterial level. If there is continued clinical concern, consider further evaluation with lower extremity doppler.  2. Mild bronchiectasis with a central hilar lower lobe predominance.  3.  Minimal scattered punctate nodules present, partially obscured by motion artifact suggesting either minimal infection, atelectasis  or scarring.  The largest measurable nodule is 4 mm in diameter, and is unchanged from the previous CT, for which no additional follow-up will be warranted.    CXR:  No acute cardiopulmonary abnormality.

## 2025-03-19 NOTE — ASSESSMENT & PLAN NOTE
Patient's COPD is with exacerbation noted by continued dyspnea and use of accessory muscles for breathing currently.  Patient is currently on COPD Pathway. Continue scheduled inhalers Steroids, Antibiotics, and Supplemental oxygen and monitor respiratory status closely.   Sputum culture grew Pseudomonas species.  Patient is on intravenous cefepime day 6.    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   On IV solumedrol 40 q 8 hrs.

## 2025-03-19 NOTE — PT/OT/SLP PROGRESS
Occupational Therapy      Patient Name:  Reji Romano   MRN:  [id number]    Patient not seen today secondary to Patient unwilling to participate. Will follow-up 3/20/2025.    3/19/2025

## 2025-03-19 NOTE — PROGRESS NOTES
Pulmonary/Critical Care  Progress Note      Patient name: Reji Romano  MRN: [id number]  Date: 03/19/2025    Admit Date: 3/13/2025  Consult Requested By: Zoey Copeland MD    Reason for Consult: REspiratory failure, COPD    HPI:    3/17/2025 - Pt has been admitted fr a few days and was tentatively going home today when he developed an acute decompensation.  I was called to see him.  CXR looked OK and ABG was OK but he had increased WOB.  When I arrived he still has increased WOB biut will settle down at times.  He goes from not responding to questions to answering (though voice quality is not good).  He has COPD (I suspect very severe, wife says at one point there was talk about transplant), h/o head and neck cancer, s/p surgery, XRT (she tells me no active disease), prior trach (she says related to respiratory failure with sepsis), severe malnutrition.  I discussed at length with them about code status and at this time he wants to be a full code - he does not want to live prolonged on a ventilator (I did discuss that if he ends up on a ventilator that extubation would be very difficult).  We also discussed trying BiPAP and he is willing to try.  He also has chronic pain issues and I feel taht we need to continue his medications.      3/18/2025 - He is doing better this AM and has been able to use the BiPAP some, no new issues but he remains very tenuous and could decompensate at any time.  WBC increased, SC with GNR    3/19- sputum cx with pseudomonas. Resp status has been stable, on nasal cannula 3L.     Review of Systems    Review of Systems   Constitutional:  Positive for malaise/fatigue and weight loss. Negative for chills, diaphoresis and fever.   HENT:  Negative for congestion.    Eyes:  Negative for pain.   Respiratory:  Positive for cough, sputum production (trouble clearing airway) and shortness of breath. Negative for hemoptysis, wheezing and stridor.    Cardiovascular:  Negative for chest pain,  palpitations, orthopnea, claudication, leg swelling and PND.   Gastrointestinal:  Positive for nausea. Negative for abdominal pain, constipation, diarrhea, heartburn and vomiting.        PEG   Genitourinary:  Negative for dysuria, frequency and urgency.   Musculoskeletal:  Negative for falls and myalgias.   Neurological:  Positive for weakness. Negative for sensory change and focal weakness.   Psychiatric/Behavioral:  Negative for depression. The patient is not nervous/anxious.        Past Medical History    History reviewed. No pertinent past medical history.    Past Surgical History    History reviewed. No pertinent surgical history.    Medications (scheduled):      albuterol-ipratropium  3 mL Nebulization Q4H WAKE    amitriptyline  50 mg Oral QHS    arformoteroL  15 mcg Nebulization BID    budesonide  1 mg Nebulization Q12H    ceFEPime IV (PEDS and ADULTS)  2 g Intravenous Q8H    enoxparin  40 mg Subcutaneous Daily    levothyroxine  125 mcg Oral QHS    methylPREDNISolone injection (PEDS and ADULTS)  40 mg Intravenous Q8H    metroNIDAZOLE IV (PEDS and ADULTS)  500 mg Intravenous Q8H    roflumilast  500 mcg Oral Daily    sodium chloride 3%  4 mL Nebulization BID       Medications (infusions):         Medications (prn):       Current Facility-Administered Medications:     hydrOXYzine HCL, 25 mg, Oral, BID PRN    morphine, 2 mg, Intravenous, Q4H PRN    ondansetron, 4 mg, Intravenous, Q12H PRN    oxyCODONE, 20 mg, Oral, TID PRN    Flushing PICC/Midline Protocol, , , Until Discontinued **AND** sodium chloride 0.9%, 10 mL, Intravenous, Q12H PRN    sodium chloride 0.9%, 3 mL, Intravenous, PRN    Family History: No family history on file.    Social History: Tobacco: Tobacco Use History[1]                             EtOH:   Social History     Substance and Sexual Activity   Alcohol Use Not Currently                                Drugs:   Social History     Substance and Sexual Activity   Drug Use Not Currently  "    Physical Exam    Vital signs:  Temp:  [97.2 °F (36.2 °C)-98.8 °F (37.1 °C)]   Pulse:  []   Resp:  [16-25]   BP: ()/(55-85)   SpO2:  [77 %-98 %]     Intake/Output:   Intake/Output Summary (Last 24 hours) at 3/19/2025 0937  Last data filed at 3/18/2025 1830  Gross per 24 hour   Intake 725.7 ml   Output --   Net 725.7 ml        BMI: Estimated body mass index is 17.87 kg/m² as calculated from the following:    Height as of this encounter: 5' 5" (1.651 m).    Weight as of this encounter: 48.7 kg (107 lb 5.8 oz).    Physical Exam  Vitals and nursing note reviewed.   Constitutional:       General: He is in acute distress.      Appearance: Normal appearance. He is ill-appearing. He is not toxic-appearing or diaphoretic.      Comments: Thin, frail  Wearing BiPAP   HENT:      Head: Normocephalic and atraumatic.      Comments: Prior surgery (wife says he has titanium in his jaw)     Right Ear: External ear normal.      Left Ear: External ear normal.      Nose: Nose normal.      Mouth/Throat:      Mouth: Mucous membranes are moist.      Pharynx: Oropharyngeal exudate and posterior oropharyngeal erythema present.      Comments: Throat and mouth with thick green mucous  Eyes:      General: No scleral icterus.        Right eye: No discharge.         Left eye: No discharge.      Extraocular Movements: Extraocular movements intact.      Conjunctiva/sclera: Conjunctivae normal.      Pupils: Pupils are equal, round, and reactive to light.   Neck:      Vascular: No carotid bruit.      Comments: Prior trach site  Very firm neck tissue from treatment  Decreased mobility  Cardiovascular:      Rate and Rhythm: Normal rate and regular rhythm.      Pulses: Normal pulses.      Heart sounds: Normal heart sounds. No murmur heard.     No friction rub. No gallop.   Pulmonary:      Effort: No respiratory distress.      Breath sounds: No stridor. Rhonchi and rales present. No wheezing.      Comments: Decreased BS " "throughout  Intermittent acc m use  Chest:      Chest wall: No tenderness.   Abdominal:      General: Bowel sounds are normal. There is no distension.      Tenderness: There is no abdominal tenderness. There is no guarding.      Comments: PEG   Musculoskeletal:         General: No swelling. Normal range of motion.      Cervical back: Normal range of motion and neck supple. No rigidity or tenderness.      Right lower leg: No edema.      Left lower leg: No edema.      Comments: + surgical scars  + muscle wasting   Lymphadenopathy:      Cervical: No cervical adenopathy.   Skin:     General: Skin is warm and dry.      Capillary Refill: Capillary refill takes less than 2 seconds.      Coloration: Skin is not jaundiced.      Findings: No bruising.   Neurological:      General: No focal deficit present.      Mental Status: He is alert. Mental status is at baseline.      Cranial Nerves: No cranial nerve deficit.      Sensory: No sensory deficit.      Motor: Weakness present.      Comments: Waxing and waning of LOC  Difficulty understanding when he vocalizes   Psychiatric:      Comments: calm         Laboratory    No results for input(s): "WBC", "RBC", "HGB", "HCT", "PLT", "MCV", "MCH", "MCHC" in the last 24 hours.      Recent Labs   Lab 03/18/25  0942   CALCIUM 8.4*   ALBUMIN 2.6*   PROT 5.1*   *   K 4.1   CO2 33*      BUN 37*   CREATININE 0.8   ALKPHOS 81   ALT 26   AST 18   BILITOT 0.5       No results for input(s): "PT", "INR", "APTT" in the last 24 hours.    No results for input(s): "CPK", "CPKMB", "TROPONINI", "MB" in the last 24 hours.    Additional labs: reviewed    Microbiology:       Microbiology Results (last 7 days)       Procedure Component Value Units Date/Time    Blood culture x two cultures. Draw prior to antibiotics [[id number]] Collected: 03/14/25 0653    Order Status: Completed Specimen: Blood Updated: 03/19/25 0832     Blood Culture, Routine No growth after 5 days.    Narrative:      Aerobic " and anaerobic  Collection has been rescheduled by [facility] at 03/14/2025 00:30 Reason:   Unable to collect 2nd set. Nurse Notified  Collection has been rescheduled by [facility] at 03/14/2025 00:30 Reason:   Unable to collect 2nd set. Nurse Notified    Culture, Respiratory with Gram Stain [[id number]]  (Abnormal)  (Susceptibility) Collected: 03/17/25 0753    Order Status: Completed Specimen: Respiratory from Sputum, Expectorated Updated: 03/19/25 0628     Respiratory Culture PSEUDOMONAS AERUGINOSA  Moderate       Gram Stain (Respiratory) >10 epithelial cells per low power field     Gram Stain (Respiratory) Many WBC's     Gram Stain (Respiratory) Moderate Gram negative rods    Blood culture x two cultures. Draw prior to antibiotics [[id number]] Collected: 03/13/25 2344    Order Status: Completed Specimen: Blood from Peripheral, Hand, Right Updated: 03/19/25 0232     Blood Culture, Routine No growth after 5 days.    Narrative:      Aerobic and anaerobic    MRSA Screen by PCR [[id number]] Collected: 03/14/25 1343    Order Status: Completed Specimen: Nasal Swab Updated: 03/14/25 1651     MRSA SCREEN BY PCR Not Detected    Respiratory Infection Panel (PCR), Nasopharyngeal [[id number]] Collected: 03/14/25 0902    Order Status: Completed Specimen: Nasopharyngeal Swab Updated: 03/14/25 1103     Respiratory Infection Panel Source NP swab     Adenovirus Not Detected     Coronavirus 229E, Common Cold Virus Not Detected     Coronavirus HKU1, Common Cold Virus Not Detected     Coronavirus NL63, Common Cold Virus Not Detected     Coronavirus OC43, Common Cold Virus Not Detected     Comment: Coronavirus strains 229E, HKU1, NL63, and OC43 can cause the common   cold   and are not associated with the respiratory disease outbreak caused   by  the COVID-19 (SARS-CoV-2 novel Coronavirus) strain.           SARS-CoV2 (COVID-19) Qualitative PCR Not Detected     Human Metapneumovirus Not Detected     Human Rhinovirus/Enterovirus Not Detected      Influenza A Not Detected     Influenza B Not Detected     Parainfluenza Virus 1 Not Detected     Parainfluenza Virus 2 Not Detected     Parainfluenza Virus 3 Not Detected     Parainfluenza Virus 4 Not Detected     Respiratory Syncytial Virus Not Detected     Bordetella Parapertussis (AY2306) Not Detected     Bordetella pertussis (ptxP) Not Detected     Chlamydia pneumoniae Not Detected     Mycoplasma pneumoniae Not Detected     Comment: Respiratory Infection Panel testing performed by Multiplex PCR.       Narrative:      Respiratory Infection Panel source->NP Swab            Radiology    X-Ray Chest 1 View  Narrative: EXAMINATION:  XR CHEST 1 VIEW    CLINICAL HISTORY:  sob;    FINDINGS:  Portable chest with comparison chest x-ray 03/14/2025.  Normal cardiomediastinal silhouette.Lungs are clear. Pulmonary vasculature is normal. No acute osseous abnormality.  Left brachial venous line noted.  Impression: No acute cardiopulmonary abnormality.    Electronically signed by: Oscar Mccormick  Date:    03/17/2025  Time:    15:12    CXR with hyperinflation and flattened diaphragms    Additional Studies    reviewed    Ventilator Information                  Recent Labs     03/17/25  1453   PH 7.355   PCO2 55.1*   PO2 89   HCO3 30.8*   POCSATURATED 96   BE 5*         Impression    Active Hospital Problems    Diagnosis  POA    *Acute on chronic respiratory failure with hypoxia [J96.21]  Yes    Goals of care, counseling/discussion [Z71.89]  Not Applicable    Palliative care by specialist [Z51.5]  Not Applicable    ACP (advance care planning) [Z71.89]  Not Applicable    Sepsis [A41.9]  Yes    Possible Community acquired pneumonia [J18.9]  Yes    Severe malnutrition [E43]  Yes    COPD with acute exacerbation [J44.1]  Yes    Laryngeal cancer s/p surgery and chemo [C32.9]  Yes    PEG tube  [K94.23]  Yes    Bronchiectasis [J47.9]  Yes      Resolved Hospital Problems   No resolved problems to display.       Plan    Continue respiratory  treatments, steroids, antibiotics  BiPAP as tolerated, O2 as able  Would continue his chronic pain meds to help with pain, air hunger and avoid any withdrawal  Watch closely  Lovenox for prophylaxis  Agree with plans for palliative care team to meet with them and help them discuss goals of care going forward      Claudette Thompson MD  Pulmonary & Critical Care Medicine           [1]   Social History  Tobacco Use   Smoking Status Former    Types: Cigarettes   Smokeless Tobacco Not on file

## 2025-03-19 NOTE — PROGRESS NOTES
[facility] Medicine  Progress Note    Patient Name: Reji Romano  MRN: [id number]  Patient Class: IP- Inpatient   Admission Date: 3/13/2025  Length of Stay: 5 days  Attending Physician: Zoey Copeland MD  Primary Care Provider: Brenda, Provider        Subjective     Principal Problem:Acute on chronic respiratory failure with hypoxia        HPI:  This is a 70-year-old  male with a history of severe COPD, chronic hypoxic respiratory failure on 2 L nasal cannula 24/7, severe cachexia with a BMI 18.3,  throat cancer status post chemoradiation with chronic dysphagia on PEG tube feeding presenting here for worsened shortness of breaths for past few days.  Also reported may have altered mental status, however patient is awake alert when I evaluated him.  Patient has severe dysarthria secondary to previous throat cancer, history is obtained from wife at bedside.  Patient apparently has been feel unwell for few days, no sick contacts.  No chest pain.  No fever or chills.  Patient has PEG tube with every feeding/ medication through this.  Patient has is absolutely nothing by mouth.  Patient also has a chest vest on 3% normal saline nebulizer at home.   In the ER, patient is afebrile, hemodynamically stable.  WBC count is 40 K. patient did not received any steroids prior to this as per wife.     Overview/Hospital Course:  70-year-old male with a history of throat cancer status post chemoradiation, chronic dysphagia/ dysarthria PEG tube feeding, COPD on 2 L home ,chronic Pseudomonas colonization came in with worsening SOB and admitted for acute on chronic hypoxic respiratory failure likely multifactorial including COPD exacerbation wth possible pneumonia (elevated processioning).   Patient was maintained on intravenous cefepime and metronidazole. CT abdomen showed possible colitis and patient was started on antibiotics.  CTA of the chest was negative for PE.  Slowly wean off steroids as  needed for COPD exacerbation.  Pulmonary Medicine and palliative medicine teams have been consulted.  Patient required use of BiPAP noninvasive Ventilatory therapy to improve work of breathing. Percussion vest therapy continued.    Interval History:  Frail elderly male  Currently resting, on 3 liter/minute supplemental oxygen via nasal cannula.  Patient's daughter is present at bedside.  Palliative Medicine   Following the patient.  Pulmonary medicine closely following.   No further diarrhea reported today. Denies any chest pain or any focal neurological complaints.     Review of Systems   All other systems reviewed and are negative.    Objective:     Vital Signs (Most Recent):  Temp: 98.6 °F (37 °C) (03/19/25 0320)  Pulse: (!) 116 (03/19/25 0656)  Resp: 16 (03/19/25 0653)  BP: 98/68 (03/19/25 0655)  SpO2: (!) 94 % (03/19/25 0653) Vital Signs (24h Range):  Temp:  [97.2 °F (36.2 °C)-98.8 °F (37.1 °C)] 98.6 °F (37 °C)  Pulse:  [] 116  Resp:  [16-25] 16  SpO2:  [77 %-98 %] 94 %  BP: ()/(55-85) 98/68     Weight: 48.7 kg (107 lb 5.8 oz)  Body mass index is 17.87 kg/m².    Intake/Output Summary (Last 24 hours) at 3/19/2025 0730  Last data filed at 3/18/2025 1830  Gross per 24 hour   Intake 1125.7 ml   Output --   Net 1125.7 ml         Physical Exam  Constitutional:       Appearance: He is ill-appearing.   Cardiovascular:      Rate and Rhythm: Normal rate.      Pulses: Normal pulses.   Pulmonary:      Breath sounds: Wheezing and rales present.   Abdominal:      General: There is no distension.      Comments: PEG tube in place   Neurological:      Mental Status: He is oriented to person, place, and time.               Significant Labs:   Lab Results   Component Value Date    WBC 38.13 (HH) 03/18/2025    HGB 15.1 03/18/2025    HCT 49.7 03/18/2025    MCV 95 03/18/2025     03/18/2025       CMP  Sodium   Date Value Ref Range Status   03/18/2025 146 (H) 136 - 145 mmol/L Final     Potassium   Date Value Ref  Range Status   03/18/2025 4.1 3.5 - 5.1 mmol/L Final     Chloride   Date Value Ref Range Status   03/18/2025 106 95 - 110 mmol/L Final     CO2   Date Value Ref Range Status   03/18/2025 33 (H) 23 - 29 mmol/L Final     Glucose   Date Value Ref Range Status   03/18/2025 133 (H) 70 - 110 mg/dL Final     BUN   Date Value Ref Range Status   03/18/2025 37 (H) 8 - 23 mg/dL Final     Creatinine   Date Value Ref Range Status   03/18/2025 0.8 0.5 - 1.4 mg/dL Final     Calcium   Date Value Ref Range Status   03/18/2025 8.4 (L) 8.7 - 10.5 mg/dL Final     Total Protein   Date Value Ref Range Status   03/18/2025 5.1 (L) 6.0 - 8.4 g/dL Final     Albumin   Date Value Ref Range Status   03/18/2025 2.6 (L) 3.5 - 5.2 g/dL Final     Total Bilirubin   Date Value Ref Range Status   03/18/2025 0.5 0.1 - 1.0 mg/dL Final     Comment:     For infants and newborns, interpretation of results should be based  on gestational age, weight and in agreement with clinical  observations.    Premature Infant recommended reference ranges:  Up to 24 hours.............<8.0 mg/dL  Up to 48 hours............<12.0 mg/dL  3-5 days..................<15.0 mg/dL  6-29 days.................<15.0 mg/dL       Alkaline Phosphatase   Date Value Ref Range Status   03/18/2025 81 55 - 135 U/L Final     AST   Date Value Ref Range Status   03/18/2025 18 10 - 40 U/L Final     ALT   Date Value Ref Range Status   03/18/2025 26 10 - 44 U/L Final     Anion Gap   Date Value Ref Range Status   03/18/2025 7 (L) 8 - 16 mmol/L Final     eGFR   Date Value Ref Range Status   03/18/2025 >60.0 >60 mL/min/1.73 m^2 Final     Microbiology Results (last 7 days)       Procedure Component Value Units Date/Time    Culture, Respiratory with Gram Stain [[id number]]  (Abnormal)  (Susceptibility) Collected: 03/17/25 0753    Order Status: Completed Specimen: Respiratory from Sputum, Expectorated Updated: 03/19/25 0628     Respiratory Culture PSEUDOMONAS AERUGINOSA  Moderate       Gram Stain  (Respiratory) >10 epithelial cells per low power field     Gram Stain (Respiratory) Many WBC's     Gram Stain (Respiratory) Moderate Gram negative rods    Blood culture x two cultures. Draw prior to antibiotics [[id number]] Collected: 03/13/25 2344    Order Status: Completed Specimen: Blood from Peripheral, Hand, Right Updated: 03/19/25 0232     Blood Culture, Routine No growth after 5 days.    Narrative:      Aerobic and anaerobic    Blood culture x two cultures. Draw prior to antibiotics [[id number]] Collected: 03/14/25 0653    Order Status: Completed Specimen: Blood Updated: 03/18/25 0832     Blood Culture, Routine No Growth to date      No Growth to date      No Growth to date      No Growth to date      No Growth to date    Narrative:      Aerobic and anaerobic  Collection has been rescheduled by MYB at 03/14/2025 00:30 Reason:   Unable to collect 2nd set. Nurse Notified  Collection has been rescheduled by MYB at 03/14/2025 00:30 Reason:   Unable to collect 2nd set. Nurse Notified    MRSA Screen by PCR [[id number]] Collected: 03/14/25 1343    Order Status: Completed Specimen: Nasal Swab Updated: 03/14/25 1651     MRSA SCREEN BY PCR Not Detected    Respiratory Infection Panel (PCR), Nasopharyngeal [[id number]] Collected: 03/14/25 0902    Order Status: Completed Specimen: Nasopharyngeal Swab Updated: 03/14/25 1103     Respiratory Infection Panel Source NP swab     Adenovirus Not Detected     Coronavirus 229E, Common Cold Virus Not Detected     Coronavirus HKU1, Common Cold Virus Not Detected     Coronavirus NL63, Common Cold Virus Not Detected     Coronavirus OC43, Common Cold Virus Not Detected     Comment: Coronavirus strains 229E, HKU1, NL63, and OC43 can cause the common   cold   and are not associated with the respiratory disease outbreak caused   by  the COVID-19 (SARS-CoV-2 novel Coronavirus) strain.           SARS-CoV2 (COVID-19) Qualitative PCR Not Detected     Human Metapneumovirus Not Detected      Human Rhinovirus/Enterovirus Not Detected     Influenza A Not Detected     Influenza B Not Detected     Parainfluenza Virus 1 Not Detected     Parainfluenza Virus 2 Not Detected     Parainfluenza Virus 3 Not Detected     Parainfluenza Virus 4 Not Detected     Respiratory Syncytial Virus Not Detected     Bordetella Parapertussis ([id number]) Not Detected     Bordetella pertussis (ptxP) Not Detected     Chlamydia pneumoniae Not Detected     Mycoplasma pneumoniae Not Detected     Comment: Respiratory Infection Panel testing performed by Multiplex PCR.       Narrative:      Respiratory Infection Panel source->NP Swab          Significant Imaging:   CT abdomen and Pelvis with IV contrast:  1.  Moderate to severe colonic wall thickening in the ascending and rectosigmoid colon, with mild to moderate colonic wall thickening in the remainder of the colon compatible with pan-colitis (likely infectious/inflammatory, and or neutropenic, and less likely from vasculitis, ischemia or lymphoma), consider correlation with the symptoms, history and attention on follow-up imaging.  2.  Peg tube with balloon inflated in the body of the stomach.  No finding of bowel obstruction, intra-abdominal free air or leak.  3.  Numerous additional, and incidental findings as above.    ECHO:    Left Ventricle: The left ventricle is normal in size. Normal wall thickness. There is normal systolic function with a visually estimated ejection fraction of 65 - 70%. There is normal diastolic function.    Right Ventricle: The right ventricle is normal in size. Systolic function is normal.    IVC/SVC: Normal venous pressure at 3 mmHg.    CXR:  Small bilateral pleural effusions with adjacent airspace disease or atelectasis, left greater than right. Background of chronic interstitial changes. Normal heart size.     CTA Chest:  1. No evidence of pulmonary embolism to the segmental arterial level. If there is continued clinical concern, consider further evaluation  "with lower extremity doppler.  2. Mild bronchiectasis with a central hilar lower lobe predominance.  3.  Minimal scattered punctate nodules present, partially obscured by motion artifact suggesting either minimal infection, atelectasis or scarring.  The largest measurable nodule is 4 mm in diameter, and is unchanged from the previous CT, for which no additional follow-up will be warranted.    CXR:  No acute cardiopulmonary abnormality.       Assessment & Plan  Laryngeal cancer s/p surgery and chemo  Aware, patient has chronic PEG tube feeding.  Nothing by mouth strictly.   PEG feedings as per nutritionist recommendations.  Patient has chronic dysphagia/ dysarthria.   COPD with acute exacerbation  Patient's COPD is with exacerbation noted by continued dyspnea and use of accessory muscles for breathing currently.  Patient is currently on COPD Pathway. Continue scheduled inhalers Steroids, Antibiotics, and Supplemental oxygen and monitor respiratory status closely.   Sputum culture grew Pseudomonas species.  Patient is on intravenous cefepime day 6.    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   On IV solumedrol 40 q 8 hrs.  PEG tube   Aware, consult dietitian  Bronchiectasis  Continue home vest therapy.   Sepsis  This patient does have evidence of infective focus  My overall impression is sepsis.  Source: Respiratory  Antibiotics given-   Antibiotics (72h ago, onward)      Start     Stop Route Frequency Ordered    03/16/25 0945  metronidazole IVPB 500 mg         -- IV Every 8 hours (non-standard times) 03/16/25 0842    03/14/25 0915  ceFEPIme injection 2 g         -- IV Every 8 hours (non-standard times) 03/14/25 0803          Latest lactate reviewed-  No results for input(s): "LACTATE", "POCLAC" in the last 72 hours.    Organ dysfunction indicated by Acute respiratory failure    Fluid challenge we will give fluids    Post- resuscitation assessment Yes - I attest a sepsis perfusion exam was " performed within 6 hours of sepsis, severe sepsis, or septic shock presentation, following fluid resuscitation.      Will Not start Pressors- Levophed for MAP of 65  Source control achieved by:     MRSA negative.   Discontinue vancomycin.   CTA of the chest reviewed, no significant pneumonia.  UA negative.   CT abdomen and pelvis results reviewed.  Continue cefepime and   Metronidazole  Possible Community acquired pneumonia  Patient has a diagnosis of pneumonia. The cause of the pneumonia is suspected to be bacterial in etiology but organism is not known. The pneumonia is stable. The patient has the following signs/symptoms of pneumonia: persistent hypoxia  and cough. The patient does have a current oxygen requirement and the patient does have a home oxygen requirement. I have reviewed the pertinent imaging. The following cultures have been collected: Blood cultures and Sputum culture The culture results are listed below.     Current antimicrobial regimen consists of the antibiotics listed below. Will monitor patient closely and continue current treatment plan unchanged.   Follow Pulmonary Medicine recommendations.  Antibiotics (From admission, onward)      Start     Stop Route Frequency Ordered    03/16/25 0945  metronidazole IVPB 500 mg         -- IV Every 8 hours (non-standard times) 03/16/25 0842    03/14/25 0915  ceFEPIme injection 2 g         -- IV Every 8 hours (non-standard times) 03/14/25 0803            Microbiology Results (last 7 days)       Procedure Component Value Units Date/Time    Culture, Respiratory with Gram Stain [[id number]]  (Abnormal)  (Susceptibility) Collected: 03/17/25 0753    Order Status: Completed Specimen: Respiratory from Sputum, Expectorated Updated: 03/19/25 0628     Respiratory Culture PSEUDOMONAS AERUGINOSA  Moderate       Gram Stain (Respiratory) >10 epithelial cells per low power field     Gram Stain (Respiratory) Many WBC's     Gram Stain (Respiratory) Moderate Gram negative  rods    Blood culture x two cultures. Draw prior to antibiotics [[id number]] Collected: 03/13/25 2344    Order Status: Completed Specimen: Blood from Peripheral, Hand, Right Updated: 03/19/25 0232     Blood Culture, Routine No growth after 5 days.    Narrative:      Aerobic and anaerobic    Blood culture x two cultures. Draw prior to antibiotics [[id number]] Collected: 03/14/25 0653    Order Status: Completed Specimen: Blood Updated: 03/18/25 0832     Blood Culture, Routine No Growth to date      No Growth to date      No Growth to date      No Growth to date      No Growth to date    Narrative:      Aerobic and anaerobic  Collection has been rescheduled by MYB at 03/14/2025 00:30 Reason:   Unable to collect 2nd set. Nurse Notified  Collection has been rescheduled by MYB at 03/14/2025 00:30 Reason:   Unable to collect 2nd set. Nurse Notified    MRSA Screen by PCR [[id number]] Collected: 03/14/25 1343    Order Status: Completed Specimen: Nasal Swab Updated: 03/14/25 1651     MRSA SCREEN BY PCR Not Detected    Respiratory Infection Panel (PCR), Nasopharyngeal [[id number]] Collected: 03/14/25 0902    Order Status: Completed Specimen: Nasopharyngeal Swab Updated: 03/14/25 1103     Respiratory Infection Panel Source NP swab     Adenovirus Not Detected     Coronavirus 229E, Common Cold Virus Not Detected     Coronavirus HKU1, Common Cold Virus Not Detected     Coronavirus NL63, Common Cold Virus Not Detected     Coronavirus OC43, Common Cold Virus Not Detected     Comment: Coronavirus strains 229E, HKU1, NL63, and OC43 can cause the common   cold   and are not associated with the respiratory disease outbreak caused   by  the COVID-19 (SARS-CoV-2 novel Coronavirus) strain.           SARS-CoV2 (COVID-19) Qualitative PCR Not Detected     Human Metapneumovirus Not Detected     Human Rhinovirus/Enterovirus Not Detected     Influenza A Not Detected     Influenza B Not Detected     Parainfluenza Virus 1 Not Detected      Parainfluenza Virus 2 Not Detected     Parainfluenza Virus 3 Not Detected     Parainfluenza Virus 4 Not Detected     Respiratory Syncytial Virus Not Detected     Bordetella Parapertussis ([id number]) Not Detected     Bordetella pertussis (ptxP) Not Detected     Chlamydia pneumoniae Not Detected     Mycoplasma pneumoniae Not Detected     Comment: Respiratory Infection Panel testing performed by Multiplex PCR.       Narrative:      Respiratory Infection Panel source->NP Swab            On intravenous cefepime day 6.  Severe malnutrition  Nutrition consulted. Most recent weight and BMI monitored-     Measurements:  Wt Readings from Last 1 Encounters:   03/19/25 48.7 kg (107 lb 5.8 oz)   Body mass index is 17.87 kg/m².    Patient has been screened and assessed by RD.    Malnutrition Type:  Context:    Level:      Malnutrition Characteristic Summary:       Interventions/Recommendations (treatment strategy):  1) Continue current TF regimen of Glucerna 1.5 6 cans daily as before with 90 cc free water flush after each feeding to provide 2133 kcals and 118 g protein. 2)  If kitchen out of Glucerna, suggest Diabetisource-7 cans daily to provide 2100 kcals, 105 gm protein and 1428 mL of free water. Suggest FWF 90mL after each feed. TF to meet 100% EEN and EPN. 3.) RD to perform NFPE at follow up.    Acute on chronic respiratory failure with hypoxia  Patient with Hypoxic Respiratory failure which is Acute on chronic.  he is on home oxygen at 2 LPM. Supplemental oxygen was provided and noted- Oxygen Concentration (%):  [30] 30  Signs/symptoms of respiratory failure include- increased work of breathing, respiratory distress, and use of accessory muscles. Contributing diagnoses includes - COPD and Pneumonia Labs and images were reviewed. Will treat underlying causes and adjust management of respiratory failure as follows  Antibiotics and steroids.   Follow Pulmonary Medicine recommendations.    D/W daughter, bedside nurse and case  manager.     VTE Risk Mitigation (From admission, onward)           Ordered     enoxaparin injection 40 mg  Daily         03/14/25 0803     IP VTE HIGH RISK PATIENT  Once         03/14/25 0803     Place sequential compression device  Until discontinued         03/14/25 0803                    Discharge Planning   JENNA: 3/23/2025     Code Status: Full Code   Medical Readiness for Discharge Date: 3/17/2025  Discharge Plan A: Home Health   Discharge Delays: None known at this time            Please place Justification for DME        Zoey Copeland MD  Department of Hospital Medicine   [facility]

## 2025-03-19 NOTE — ASSESSMENT & PLAN NOTE
I reviewed the patient's chart and discussed the case with the patient's team.      I examined Reji Romano at bedside.  The patient maintains capacity for complex medical decision-making.  I spoke with him and his wife at bedside.    I introduced myself and my role as palliative care physician. They were agreeable to speaking.    We discussed the patient's medical illness, prognosis, and values in detail.  Below is a brief summary of our discussion.    He understands he is admitted and being treated for acute on chronic respiratory failure likely related to COPD exacerbation and pneumonia.  He understands his condition to be critical.  I affirmed his understanding.    We discussed his prognosis.  I explained that he is on trajectory that could be easily lead to death.  Fortunately, his condition seems to be improving during our visit today.  If he is able to continue on this trajectory I suspect he will require many weeks of recovery ahead.  If he can regain his functional status and find a way to maintain adequate nutrition I hope he can live many more good months; given his severe malnutrition and end-stage COPD I will be surprised if he is living a year from now.  I also fully explained he remains at risk of complications that could easily become life-threatening a deadly during this hospitalization.  They understood.  They were not surprised by any of this news.    We discussed his values.  He is living a reasonable enough quality of life at home.  As long as he can maintain adequate pain control and get some sleep he can find quality in life.  He is hopeful to spend more time at home with his wife.  He does not have any particular worries or fears.  He is willing to continue with further hospitalization hopes of getting better.    We spoke back and forth.  Answered all questions to the best of my ability.    At this point, his goals are aligned with current plan of care.  They remain hopeful for the best.   I recommended being prepared for the worst.  They understood.    In preparing for the worst, we did discuss hospice in the philosophy of hospice care.  Today, his goals are not aligned with hospice.  He is open to the concept of his condition does not improve.    We also discuss code status.  He initially told me he would not wish for attempts at resuscitation.  After further discussing with his wife, they elected to maintain a FULL code status with the understanding that he would not wish to be prolonged on artificial life support indefinitely.    I appreciate being involved.  I hope I have been helpful.

## 2025-03-19 NOTE — PLAN OF CARE
03/18/25 1941   Patient Assessment/Suction   Level of Consciousness (AVPU) alert   Respiratory Effort Unlabored   Expansion/Accessory Muscles/Retractions abdominal muscle use   All Lung Fields Breath Sounds coarse   Rhythm/Pattern, Respiratory unlabored   Cough Frequency infrequent   Cough Type congested;weak;productive   PRE-TX-O2   Device (Oxygen Therapy) nasal cannula with humidification   Flow (L/min) (Oxygen Therapy) 3   SpO2 97 %   Pulse Oximetry Type Continuous   $ Pulse Oximetry - Multiple Charge Pulse Oximetry - Multiple   Pulse (!) 115   Resp (!) 22   Aerosol Therapy   $ Aerosol Therapy Charges Aerosol Treatment  (duoneb)   Daily Review of Necessity (SVN) completed   Respiratory Treatment Status (SVN) given   Treatment Route (SVN) mask;air   Patient Position HOB elevated   Post Treatment Assessment (SVN) breath sounds unchanged   Signs of Intolerance (SVN) none   Vibratory PEP Therapy   Route (PEP Therapy) unable to perform   Chest Physiotherapy   $ Chest Physiotherapy Charges Refused   High Frequency Chest Compression Vest   $ Chest Compression Charges Refused   Preset CPAP/BiPAP Settings   Mode Of Delivery standby;BiPAP S/T   CPAP/BIPAP charged w/in last 24 h NO   Equipment Type V60   Ipap 10   EPAP (cm H2O) 5   Pressure Support (cm H2O) 5   Set Rate (Breaths/Min) 16   ITime (sec) 0.9   Rise Time (sec) 3   Education   $ Education BiPAP;Oxygen;15 min

## 2025-03-19 NOTE — PROGRESS NOTES
Pharmacist Renal Dose Adjustment Note    Reji Romano is a 70 y.o. male being treated with the medication Cefepime.    Patient Data:    Vital Signs (Most Recent):  Temp: 98.6 °F (37 °C) (03/19/25 0320)  Pulse: 110 (03/19/25 1057)  Resp: 18 (03/19/25 1057)  BP: 101/77 (03/19/25 1057)  SpO2: 100 % (03/19/25 1057) Vital Signs (72h Range):  Temp:  [96.8 °F (36 °C)-98.8 °F (37.1 °C)]   Pulse:  []   Resp:  [14-48]   BP: ()/(55-96)   SpO2:  [70 %-100 %]      Recent Labs   Lab 03/17/25  0322 03/18/25  0942 03/19/25  0907   CREATININE 0.5 0.8 0.9     Serum creatinine: 0.9 mg/dL 03/19/25 0907  Estimated creatinine clearance: 53.4 mL/min    Cefepime 2g every 8 hours will be changed to Cefepime 2g every 12 hours per renal dosing protocol. CrCl 30-60 ml/min.    Pharmacist's Name: Andrey Garnica  Pharmacist's Extension: [phone]

## 2025-03-19 NOTE — ASSESSMENT & PLAN NOTE
Patient has a diagnosis of pneumonia. The cause of the pneumonia is suspected to be bacterial in etiology but organism is not known. The pneumonia is stable. The patient has the following signs/symptoms of pneumonia: persistent hypoxia  and cough. The patient does have a current oxygen requirement and the patient does have a home oxygen requirement. I have reviewed the pertinent imaging. The following cultures have been collected: Blood cultures and Sputum culture The culture results are listed below.     Current antimicrobial regimen consists of the antibiotics listed below. Will monitor patient closely and continue current treatment plan unchanged.   Follow Pulmonary Medicine recommendations.  Antibiotics (From admission, onward)      Start     Stop Route Frequency Ordered    03/16/25 0945  metronidazole IVPB 500 mg         -- IV Every 8 hours (non-standard times) 03/16/25 0842    03/14/25 0915  ceFEPIme injection 2 g         -- IV Every 8 hours (non-standard times) 03/14/25 0803            Microbiology Results (last 7 days)       Procedure Component Value Units Date/Time    Culture, Respiratory with Gram Stain [[id number]]  (Abnormal)  (Susceptibility) Collected: 03/17/25 0753    Order Status: Completed Specimen: Respiratory from Sputum, Expectorated Updated: 03/19/25 0628     Respiratory Culture PSEUDOMONAS AERUGINOSA  Moderate       Gram Stain (Respiratory) >10 epithelial cells per low power field     Gram Stain (Respiratory) Many WBC's     Gram Stain (Respiratory) Moderate Gram negative rods    Blood culture x two cultures. Draw prior to antibiotics [[id number]] Collected: 03/13/25 2344    Order Status: Completed Specimen: Blood from Peripheral, Hand, Right Updated: 03/19/25 0232     Blood Culture, Routine No growth after 5 days.    Narrative:      Aerobic and anaerobic    Blood culture x two cultures. Draw prior to antibiotics [[id number]] Collected: 03/14/25 0653    Order Status: Completed Specimen: Blood  Updated: 03/18/25 0832     Blood Culture, Routine No Growth to date      No Growth to date      No Growth to date      No Growth to date      No Growth to date    Narrative:      Aerobic and anaerobic  Collection has been rescheduled by MYB at 03/14/2025 00:30 Reason:   Unable to collect 2nd set. Nurse Notified  Collection has been rescheduled by MYB at 03/14/2025 00:30 Reason:   Unable to collect 2nd set. Nurse Notified    MRSA Screen by PCR [[id number]] Collected: 03/14/25 1343    Order Status: Completed Specimen: Nasal Swab Updated: 03/14/25 1651     MRSA SCREEN BY PCR Not Detected    Respiratory Infection Panel (PCR), Nasopharyngeal [[id number]] Collected: 03/14/25 0902    Order Status: Completed Specimen: Nasopharyngeal Swab Updated: 03/14/25 1103     Respiratory Infection Panel Source NP swab     Adenovirus Not Detected     Coronavirus 229E, Common Cold Virus Not Detected     Coronavirus HKU1, Common Cold Virus Not Detected     Coronavirus NL63, Common Cold Virus Not Detected     Coronavirus OC43, Common Cold Virus Not Detected     Comment: Coronavirus strains 229E, HKU1, NL63, and OC43 can cause the common   cold   and are not associated with the respiratory disease outbreak caused   by  the COVID-19 (SARS-CoV-2 novel Coronavirus) strain.           SARS-CoV2 (COVID-19) Qualitative PCR Not Detected     Human Metapneumovirus Not Detected     Human Rhinovirus/Enterovirus Not Detected     Influenza A Not Detected     Influenza B Not Detected     Parainfluenza Virus 1 Not Detected     Parainfluenza Virus 2 Not Detected     Parainfluenza Virus 3 Not Detected     Parainfluenza Virus 4 Not Detected     Respiratory Syncytial Virus Not Detected     Bordetella Parapertussis ([id number]) Not Detected     Bordetella pertussis (ptxP) Not Detected     Chlamydia pneumoniae Not Detected     Mycoplasma pneumoniae Not Detected     Comment: Respiratory Infection Panel testing performed by Multiplex PCR.       Narrative:       Respiratory Infection Panel source->NP Swab            On intravenous cefepime day 6.

## 2025-03-20 VITALS
SYSTOLIC BLOOD PRESSURE: 101 MMHG | WEIGHT: 107.38 LBS | OXYGEN SATURATION: 95 % | BODY MASS INDEX: 17.89 KG/M2 | RESPIRATION RATE: 25 BRPM | TEMPERATURE: 98 F | HEIGHT: 65 IN | HEART RATE: 104 BPM | DIASTOLIC BLOOD PRESSURE: 66 MMHG

## 2025-03-20 PROBLEM — Z51.5 COMFORT MEASURES ONLY STATUS: Status: ACTIVE | Noted: 2025-03-20

## 2025-03-20 LAB
ALBUMIN SERPL BCP-MCNC: 2.7 G/DL (ref 3.5–5.2)
ALP SERPL-CCNC: 108 U/L (ref 55–135)
ALT SERPL W/O P-5'-P-CCNC: 35 U/L (ref 10–44)
ANION GAP SERPL CALC-SCNC: 10 MMOL/L (ref 8–16)
ANISOCYTOSIS BLD QL SMEAR: SLIGHT
AST SERPL-CCNC: 28 U/L (ref 10–40)
BASOPHILS # BLD AUTO: 0.01 K/UL (ref 0–0.2)
BASOPHILS NFR BLD: 0 % (ref 0–1.9)
BILIRUB SERPL-MCNC: 0.5 MG/DL (ref 0.1–1)
BUN SERPL-MCNC: 59 MG/DL (ref 8–23)
CALCIUM SERPL-MCNC: 8.4 MG/DL (ref 8.7–10.5)
CHLORIDE SERPL-SCNC: 111 MMOL/L (ref 95–110)
CO2 SERPL-SCNC: 28 MMOL/L (ref 23–29)
CREAT SERPL-MCNC: 1.1 MG/DL (ref 0.5–1.4)
DIFFERENTIAL METHOD BLD: ABNORMAL
EOSINOPHIL # BLD AUTO: 0 K/UL (ref 0–0.5)
EOSINOPHIL NFR BLD: 0 % (ref 0–8)
ERYTHROCYTE [DISTWIDTH] IN BLOOD BY AUTOMATED COUNT: 14.8 % (ref 11.5–14.5)
EST. GFR  (NO RACE VARIABLE): >60 ML/MIN/1.73 M^2
GLUCOSE SERPL-MCNC: 124 MG/DL (ref 70–110)
HCT VFR BLD AUTO: 50.2 % (ref 40–54)
HGB BLD-MCNC: 14.9 G/DL (ref 14–18)
IMM GRANULOCYTES # BLD AUTO: 1.88 K/UL (ref 0–0.04)
IMM GRANULOCYTES NFR BLD AUTO: 3.5 % (ref 0–0.5)
LYMPHOCYTES # BLD AUTO: 0.5 K/UL (ref 1–4.8)
LYMPHOCYTES NFR BLD: 1 % (ref 18–48)
MAGNESIUM SERPL-MCNC: 2.8 MG/DL (ref 1.6–2.6)
MCH RBC QN AUTO: 28.4 PG (ref 27–31)
MCHC RBC AUTO-ENTMCNC: 29.7 G/DL (ref 32–36)
MCV RBC AUTO: 96 FL (ref 82–98)
MONOCYTES # BLD AUTO: 0.6 K/UL (ref 0.3–1)
MONOCYTES NFR BLD: 1.2 % (ref 4–15)
NEUTROPHILS # BLD AUTO: 50.1 K/UL (ref 1.8–7.7)
NEUTROPHILS NFR BLD: 94.3 % (ref 38–73)
NRBC BLD-RTO: 0 /100 WBC
PATH REV BLD -IMP: NORMAL
PHOSPHATE SERPL-MCNC: 4.9 MG/DL (ref 2.7–4.5)
PLATELET # BLD AUTO: 279 K/UL (ref 150–450)
PLATELET BLD QL SMEAR: ABNORMAL
PMV BLD AUTO: 11.2 FL (ref 9.2–12.9)
POTASSIUM SERPL-SCNC: 4.7 MMOL/L (ref 3.5–5.1)
PROT SERPL-MCNC: 5 G/DL (ref 6–8.4)
RBC # BLD AUTO: 5.24 M/UL (ref 4.6–6.2)
SODIUM SERPL-SCNC: 149 MMOL/L (ref 136–145)
WBC # BLD AUTO: 53.16 K/UL (ref 3.9–12.7)

## 2025-03-20 PROCEDURE — 63600175 PHARM REV CODE 636 W HCPCS: Performed by: INTERNAL MEDICINE

## 2025-03-20 PROCEDURE — 99233 SBSQ HOSP IP/OBS HIGH 50: CPT | Mod: ,,, | Performed by: INTERNAL MEDICINE

## 2025-03-20 PROCEDURE — 25000242 PHARM REV CODE 250 ALT 637 W/ HCPCS: Performed by: HOSPITALIST

## 2025-03-20 PROCEDURE — 36415 COLL VENOUS BLD VENIPUNCTURE: CPT | Performed by: HOSPITALIST

## 2025-03-20 PROCEDURE — 84100 ASSAY OF PHOSPHORUS: CPT | Performed by: HOSPITALIST

## 2025-03-20 PROCEDURE — 27000221 HC OXYGEN, UP TO 24 HOURS

## 2025-03-20 PROCEDURE — 94640 AIRWAY INHALATION TREATMENT: CPT

## 2025-03-20 PROCEDURE — 83735 ASSAY OF MAGNESIUM: CPT | Performed by: HOSPITALIST

## 2025-03-20 PROCEDURE — 85025 COMPLETE CBC W/AUTO DIFF WBC: CPT | Performed by: STUDENT IN AN ORGANIZED HEALTH CARE EDUCATION/TRAINING PROGRAM

## 2025-03-20 PROCEDURE — 94761 N-INVAS EAR/PLS OXIMETRY MLT: CPT

## 2025-03-20 PROCEDURE — 63600175 PHARM REV CODE 636 W HCPCS: Mod: JZ,TB | Performed by: FAMILY MEDICINE

## 2025-03-20 PROCEDURE — 99900035 HC TECH TIME PER 15 MIN (STAT)

## 2025-03-20 PROCEDURE — 80053 COMPREHEN METABOLIC PANEL: CPT | Performed by: HOSPITALIST

## 2025-03-20 PROCEDURE — 63600175 PHARM REV CODE 636 W HCPCS: Performed by: HOSPITALIST

## 2025-03-20 RX ORDER — MORPHINE SULFATE 2 MG/ML
2 INJECTION, SOLUTION INTRAMUSCULAR; INTRAVENOUS
Status: DISCONTINUED | OUTPATIENT
Start: 2025-03-20 | End: 2025-03-20 | Stop reason: HOSPADM

## 2025-03-20 RX ORDER — LORAZEPAM 2 MG/ML
2 INJECTION INTRAMUSCULAR
Status: DISCONTINUED | OUTPATIENT
Start: 2025-03-20 | End: 2025-03-20 | Stop reason: HOSPADM

## 2025-03-20 RX ORDER — LEVOFLOXACIN 500 MG/1
500 TABLET, FILM COATED ORAL DAILY
Qty: 5 TABLET | Refills: 0 | Status: SHIPPED | OUTPATIENT
Start: 2025-03-20 | End: 2025-03-25

## 2025-03-20 RX ADMIN — CEFEPIME 2 G: 2 INJECTION, POWDER, FOR SOLUTION INTRAVENOUS at 05:03

## 2025-03-20 RX ADMIN — METHYLPREDNISOLONE SODIUM SUCCINATE 40 MG: 40 INJECTION, POWDER, FOR SOLUTION INTRAMUSCULAR; INTRAVENOUS at 05:03

## 2025-03-20 RX ADMIN — ARFORMOTEROL TARTRATE 15 MCG: 15 SOLUTION RESPIRATORY (INHALATION) at 07:03

## 2025-03-20 RX ADMIN — METRONIDAZOLE 500 MG: 500 INJECTION, SOLUTION INTRAVENOUS at 05:03

## 2025-03-20 RX ADMIN — LORAZEPAM 2 MG: 2 INJECTION INTRAMUSCULAR; INTRAVENOUS at 01:03

## 2025-03-20 RX ADMIN — IPRATROPIUM BROMIDE AND ALBUTEROL SULFATE 3 ML: .5; 3 SOLUTION RESPIRATORY (INHALATION) at 11:03

## 2025-03-20 RX ADMIN — IPRATROPIUM BROMIDE AND ALBUTEROL SULFATE 3 ML: .5; 3 SOLUTION RESPIRATORY (INHALATION) at 07:03

## 2025-03-20 RX ADMIN — BUDESONIDE INHALATION 1 MG: 0.5 SUSPENSION RESPIRATORY (INHALATION) at 07:03

## 2025-03-20 RX ADMIN — LORAZEPAM 2 MG: 2 INJECTION INTRAMUSCULAR; INTRAVENOUS at 11:03

## 2025-03-20 RX ADMIN — MORPHINE SULFATE 2 MG: 2 INJECTION, SOLUTION INTRAMUSCULAR; INTRAVENOUS at 12:03

## 2025-03-20 NOTE — PT/OT/SLP PROGRESS
Physical Therapy      Patient Name:  Reji Romano   MRN:  [id number]    Patient not seen today secondary to prepping to d/c home with hospice.

## 2025-03-20 NOTE — NURSING
EMS at bedside to transport pt to home with family for hospice care. IV and telemetry discontinued.

## 2025-03-20 NOTE — PLAN OF CARE
Per Michael with [facility], pt spouse signed admission paperwork. Pt wants to go home and DME needed is already in the home. Meds are being ordered.     Pt will need ambulance transport home.       03/20/25 1035   Discharge Reassessment   Assessment Type Discharge Planning Reassessment   Did the patient's condition or plan change since previous assessment? Yes   Discharge Plan discussed with: Spouse/sig other   Communicated JENNA with patient/caregiver Yes   Discharge Plan A Hospice/home   Discharge Plan B Hospice/home   Transition of Care Barriers None   Post-Acute Status   Home Health Status Discharge Plan Changed   Hospice Status Pending Payor Review   Discharge Delays Orders Needed

## 2025-03-20 NOTE — NURSING
"Patient pulling a BiPAP and IV lines. Patient noted to be air hungry. PRN medications given. BiPAP taken off and patient suctioned. Thick tan secretions produced. Patient asked for pen and paper. Patient wrote, "I want to go to ICU, I want the tube." Wife was at bedside and confirmed that is what pt stated. MD notified. /76  O2 stat unable to obtain r/t cyanotic fingertips. Resp 35.  "

## 2025-03-20 NOTE — ASSESSMENT & PLAN NOTE
Patient has a diagnosis of pneumonia. The cause of the pneumonia is suspected to be bacterial in etiology but organism is not known. The pneumonia is stable. The patient has the following signs/symptoms of pneumonia: persistent hypoxia  and cough. The patient does have a current oxygen requirement and the patient does have a home oxygen requirement. I have reviewed the pertinent imaging. The following cultures have been collected: Blood cultures and Sputum culture The culture results are listed below.     Current antimicrobial regimen consists of the antibiotics listed below. Will monitor patient closely and continue current treatment plan unchanged.   Follow Pulmonary Medicine recommendations.  Antibiotics (From admission, onward)      Start     Stop Route Frequency Ordered    03/19/25 1800  ceFEPIme injection 2 g         -- IV Every 12 hours (non-standard times) 03/19/25 1119    03/16/25 0945  metronidazole IVPB 500 mg         -- IV Every 8 hours (non-standard times) 03/16/25 0842            Microbiology Results (last 7 days)       Procedure Component Value Units Date/Time    Blood culture x two cultures. Draw prior to antibiotics [[id number]] Collected: 03/14/25 0653    Order Status: Completed Specimen: Blood Updated: 03/19/25 0832     Blood Culture, Routine No growth after 5 days.    Narrative:      Aerobic and anaerobic  Collection has been rescheduled by MYB at 03/14/2025 00:30 Reason:   Unable to collect 2nd set. Nurse Notified  Collection has been rescheduled by MYB at 03/14/2025 00:30 Reason:   Unable to collect 2nd set. Nurse Notified    Culture, Respiratory with Gram Stain [[id number]]  (Abnormal)  (Susceptibility) Collected: 03/17/25 0753    Order Status: Completed Specimen: Respiratory from Sputum, Expectorated Updated: 03/19/25 0628     Respiratory Culture PSEUDOMONAS AERUGINOSA  Moderate       Gram Stain (Respiratory) >10 epithelial cells per low power field     Gram Stain (Respiratory) Many WBC's      Gram Stain (Respiratory) Moderate Gram negative rods    Blood culture x two cultures. Draw prior to antibiotics [[id number]] Collected: 03/13/25 2344    Order Status: Completed Specimen: Blood from Peripheral, Hand, Right Updated: 03/19/25 0232     Blood Culture, Routine No growth after 5 days.    Narrative:      Aerobic and anaerobic    MRSA Screen by PCR [[id number]] Collected: 03/14/25 1343    Order Status: Completed Specimen: Nasal Swab Updated: 03/14/25 1651     MRSA SCREEN BY PCR Not Detected    Respiratory Infection Panel (PCR), Nasopharyngeal [[id number]] Collected: 03/14/25 0902    Order Status: Completed Specimen: Nasopharyngeal Swab Updated: 03/14/25 1103     Respiratory Infection Panel Source NP swab     Adenovirus Not Detected     Coronavirus 229E, Common Cold Virus Not Detected     Coronavirus HKU1, Common Cold Virus Not Detected     Coronavirus NL63, Common Cold Virus Not Detected     Coronavirus OC43, Common Cold Virus Not Detected     Comment: Coronavirus strains 229E, HKU1, NL63, and OC43 can cause the common   cold   and are not associated with the respiratory disease outbreak caused   by  the COVID-19 (SARS-CoV-2 novel Coronavirus) strain.           SARS-CoV2 (COVID-19) Qualitative PCR Not Detected     Human Metapneumovirus Not Detected     Human Rhinovirus/Enterovirus Not Detected     Influenza A Not Detected     Influenza B Not Detected     Parainfluenza Virus 1 Not Detected     Parainfluenza Virus 2 Not Detected     Parainfluenza Virus 3 Not Detected     Parainfluenza Virus 4 Not Detected     Respiratory Syncytial Virus Not Detected     Bordetella Parapertussis ([id number]) Not Detected     Bordetella pertussis (ptxP) Not Detected     Chlamydia pneumoniae Not Detected     Mycoplasma pneumoniae Not Detected     Comment: Respiratory Infection Panel testing performed by Multiplex PCR.       Narrative:      Respiratory Infection Panel source->NP Swab            On intravenous cefepime day 6.

## 2025-03-20 NOTE — PLAN OF CARE
SWCM ordered ADT30. Family and [facility] are aware of today's expected discharge.     Discharge orders and chart reviewed with no further post-acute discharge needs identified at this time. Patient is cleared for discharge from Case Management standpoint.      03/20/25 1159   Final Note   Assessment Type Final Discharge Note   Anticipated Discharge Disposition [facility] Resources/Appts/Education Provided Post-Acute resouces added to AVS   Post-Acute Status   Post-Acute Authorization Hospice   Home Health Status Discharge Plan Changed   Hospice Status Set-up Complete/Auth obtained   Discharge Delays (!) [facility]FC Arranged Transportation

## 2025-03-20 NOTE — SUBJECTIVE & OBJECTIVE
Interval History:  Frail elderly male  Currently resting, on 3 liter/minute supplemental oxygen via nasal cannula.  Patient's daughter is present at bedside.  Palliative Medicine   Following the patient.  Pulmonary medicine closely following.   No further diarrhea reported today. Denies any chest pain or any focal neurological complaints.     Review of Systems   All other systems reviewed and are negative.    Objective:     Vital Signs (Most Recent):  Temp: 98 °F (36.7 °C) (03/20/25 0701)  Pulse: 101 (03/20/25 0726)  Resp: 18 (03/20/25 0726)  BP: 101/66 (03/20/25 0728)  SpO2: (!) 93 % (03/20/25 0726) Vital Signs (24h Range):  Temp:  [96.6 °F (35.9 °C)-98 °F (36.7 °C)] 98 °F (36.7 °C)  Pulse:  [] 101  Resp:  [15-59] 18  SpO2:  [78 %-100 %] 93 %  BP: ()/(53-92) 101/66     Weight: 48.7 kg (107 lb 5.8 oz)  Body mass index is 17.87 kg/m².    Intake/Output Summary (Last 24 hours) at 3/20/2025 0833  Last data filed at 3/20/2025 0537  Gross per 24 hour   Intake 0 ml   Output --   Net 0 ml         Physical Exam  Constitutional:       Appearance: He is ill-appearing.   Cardiovascular:      Rate and Rhythm: Normal rate.      Pulses: Normal pulses.   Pulmonary:      Breath sounds: Wheezing and rales present.   Abdominal:      General: There is no distension.      Comments: PEG tube in place   Neurological:      Mental Status: He is oriented to person, place, and time.               Significant Labs:   Lab Results   Component Value Date    WBC 53.16 (HH) 03/20/2025    HGB 14.9 03/20/2025    HCT 50.2 03/20/2025    MCV 96 03/20/2025     03/20/2025       CMP  Sodium   Date Value Ref Range Status   03/20/2025 149 (H) 136 - 145 mmol/L Final     Potassium   Date Value Ref Range Status   03/20/2025 4.7 3.5 - 5.1 mmol/L Final     Chloride   Date Value Ref Range Status   03/20/2025 111 (H) 95 - 110 mmol/L Final     CO2   Date Value Ref Range Status   03/20/2025 28 23 - 29 mmol/L Final     Glucose   Date Value Ref Range  Status   03/20/2025 124 (H) 70 - 110 mg/dL Final     BUN   Date Value Ref Range Status   03/20/2025 59 (H) 8 - 23 mg/dL Final     Creatinine   Date Value Ref Range Status   03/20/2025 1.1 0.5 - 1.4 mg/dL Final     Calcium   Date Value Ref Range Status   03/20/2025 8.4 (L) 8.7 - 10.5 mg/dL Final     Total Protein   Date Value Ref Range Status   03/20/2025 5.0 (L) 6.0 - 8.4 g/dL Final     Albumin   Date Value Ref Range Status   03/20/2025 2.7 (L) 3.5 - 5.2 g/dL Final     Total Bilirubin   Date Value Ref Range Status   03/20/2025 0.5 0.1 - 1.0 mg/dL Final     Comment:     For infants and newborns, interpretation of results should be based  on gestational age, weight and in agreement with clinical  observations.    Premature Infant recommended reference ranges:  Up to 24 hours.............<8.0 mg/dL  Up to 48 hours............<12.0 mg/dL  3-5 days..................<15.0 mg/dL  6-29 days.................<15.0 mg/dL       Alkaline Phosphatase   Date Value Ref Range Status   03/20/2025 108 55 - 135 U/L Final     AST   Date Value Ref Range Status   03/20/2025 28 10 - 40 U/L Final     ALT   Date Value Ref Range Status   03/20/2025 35 10 - 44 U/L Final     Anion Gap   Date Value Ref Range Status   03/20/2025 10 8 - 16 mmol/L Final     eGFR   Date Value Ref Range Status   03/20/2025 >60.0 >60 mL/min/1.73 m^2 Final     Microbiology Results (last 7 days)       Procedure Component Value Units Date/Time    Blood culture x two cultures. Draw prior to antibiotics [[id number]] Collected: 03/14/25 0653    Order Status: Completed Specimen: Blood Updated: 03/19/25 0832     Blood Culture, Routine No growth after 5 days.    Narrative:      Aerobic and anaerobic  Collection has been rescheduled by HOA at 03/14/2025 00:30 Reason:   Unable to collect 2nd set. Nurse Notified  Collection has been rescheduled by MYB at 03/14/2025 00:30 Reason:   Unable to collect 2nd set. Nurse Notified    Culture, Respiratory with Gram Stain [[id number]]   (Abnormal)  (Susceptibility) Collected: 03/17/25 0753    Order Status: Completed Specimen: Respiratory from Sputum, Expectorated Updated: 03/19/25 0628     Respiratory Culture PSEUDOMONAS AERUGINOSA  Moderate       Gram Stain (Respiratory) >10 epithelial cells per low power field     Gram Stain (Respiratory) Many WBC's     Gram Stain (Respiratory) Moderate Gram negative rods    Blood culture x two cultures. Draw prior to antibiotics [[id number]] Collected: 03/13/25 2344    Order Status: Completed Specimen: Blood from Peripheral, Hand, Right Updated: 03/19/25 0232     Blood Culture, Routine No growth after 5 days.    Narrative:      Aerobic and anaerobic    MRSA Screen by PCR [[id number]] Collected: 03/14/25 1343    Order Status: Completed Specimen: Nasal Swab Updated: 03/14/25 1651     MRSA SCREEN BY PCR Not Detected    Respiratory Infection Panel (PCR), Nasopharyngeal [[id number]] Collected: 03/14/25 0902    Order Status: Completed Specimen: Nasopharyngeal Swab Updated: 03/14/25 1103     Respiratory Infection Panel Source NP swab     Adenovirus Not Detected     Coronavirus 229E, Common Cold Virus Not Detected     Coronavirus HKU1, Common Cold Virus Not Detected     Coronavirus NL63, Common Cold Virus Not Detected     Coronavirus OC43, Common Cold Virus Not Detected     Comment: Coronavirus strains 229E, HKU1, NL63, and OC43 can cause the common   cold   and are not associated with the respiratory disease outbreak caused   by  the COVID-19 (SARS-CoV-2 novel Coronavirus) strain.           SARS-CoV2 (COVID-19) Qualitative PCR Not Detected     Human Metapneumovirus Not Detected     Human Rhinovirus/Enterovirus Not Detected     Influenza A Not Detected     Influenza B Not Detected     Parainfluenza Virus 1 Not Detected     Parainfluenza Virus 2 Not Detected     Parainfluenza Virus 3 Not Detected     Parainfluenza Virus 4 Not Detected     Respiratory Syncytial Virus Not Detected     Bordetella Parapertussis ([id number]) Not  Detected     Bordetella pertussis (ptxP) Not Detected     Chlamydia pneumoniae Not Detected     Mycoplasma pneumoniae Not Detected     Comment: Respiratory Infection Panel testing performed by Multiplex PCR.       Narrative:      Respiratory Infection Panel source->NP Swab          Significant Imaging:   CT abdomen and Pelvis with IV contrast:  1.  Moderate to severe colonic wall thickening in the ascending and rectosigmoid colon, with mild to moderate colonic wall thickening in the remainder of the colon compatible with pan-colitis (likely infectious/inflammatory, and or neutropenic, and less likely from vasculitis, ischemia or lymphoma), consider correlation with the symptoms, history and attention on follow-up imaging.  2.  Peg tube with balloon inflated in the body of the stomach.  No finding of bowel obstruction, intra-abdominal free air or leak.  3.  Numerous additional, and incidental findings as above.    ECHO:    Left Ventricle: The left ventricle is normal in size. Normal wall thickness. There is normal systolic function with a visually estimated ejection fraction of 65 - 70%. There is normal diastolic function.    Right Ventricle: The right ventricle is normal in size. Systolic function is normal.    IVC/SVC: Normal venous pressure at 3 mmHg.    CXR:  Small bilateral pleural effusions with adjacent airspace disease or atelectasis, left greater than right. Background of chronic interstitial changes. Normal heart size.     CTA Chest:  1. No evidence of pulmonary embolism to the segmental arterial level. If there is continued clinical concern, consider further evaluation with lower extremity doppler.  2. Mild bronchiectasis with a central hilar lower lobe predominance.  3.  Minimal scattered punctate nodules present, partially obscured by motion artifact suggesting either minimal infection, atelectasis or scarring.  The largest measurable nodule is 4 mm in diameter, and is unchanged from the previous CT, for  which no additional follow-up will be warranted.    CXR:  No acute cardiopulmonary abnormality.

## 2025-03-20 NOTE — PLAN OF CARE
Patient has declined medically and possibly GIP appropriate. EUGENIA met with family bedside spouse and pt's brother was present to review discharge recommendation of Hospice and they are agreeable to plan.    Michael with [facility] will be meeting with the family today around 10:30 AM to evaluate referral and speak with family, eugenia notified the family and they are anticipating meeting with Michael shortly.     Hospice referral sent to [facility] via [employer].      03/20/25 0948   Discharge Reassessment   Assessment Type Discharge Planning Reassessment   Did the patient's condition or plan change since previous assessment? Yes   Discharge Plan discussed with: Spouse/sig other   Name(s) and Number(s) Linh Moy (Spouse)  [phone] (Mobile)   Communicated JENNA with patient/caregiver Date not available/Unable to determine   Discharge Plan A Inpatient Hospice   Discharge Plan B Hospice/home   Transition of Care Barriers None   Post-Acute Status   Post-Acute Authorization Hospice   Home Health Status Discharge Plan Changed   Hospice Status Referrals Sent   Coverage [facility] - [employer] -   Discharge Delays None known at this time        The location identified, area draped and prepped as per norm, sterile technique maintained.

## 2025-03-20 NOTE — PLAN OF CARE
03/19/25 1947   Patient Assessment/Suction   Level of Consciousness (AVPU) alert   Respiratory Effort Mild   Expansion/Accessory Muscles/Retractions abdominal muscle use   All Lung Fields Breath Sounds coarse   Rhythm/Pattern, Respiratory assisted mechanically   Skin Integrity   $ Wound Care Tech Time 15 min   Area Observed Left;Behind ear;Right;Cheek;Bridge of nose   Skin Appearance without discoloration   PRE-TX-O2   Device (Oxygen Therapy) BIPAP   $ Is the patient on High Flow Oxygen? Yes   Oxygen Concentration (%) 30   SpO2 100 %   Pulse Oximetry Type Continuous   $ Pulse Oximetry - Multiple Charge Pulse Oximetry - Multiple   Pulse (!) 122   Resp (!) 22   Aerosol Therapy   $ Aerosol Therapy Charges Aerosol Treatment   Daily Review of Necessity (SVN) completed   Respiratory Treatment Status (SVN) given   Treatment Route (SVN) in-line   Patient Position HOB elevated   Post Treatment Assessment (SVN) breath sounds unchanged   Signs of Intolerance (SVN) none   Vibratory PEP Therapy   Route (PEP Therapy) refused   Chest Physiotherapy   $ Chest Physiotherapy Charges Refused   High Frequency Chest Compression Vest   $ Chest Compression Charges Refused   Respiratory Interventions   NPPV/CPAP Maintenance adjusted;full face mask;proper fit/secure;skin (device) pressure points assessed;skin-to-device protection utilized   Preset CPAP/BiPAP Settings   Mode Of Delivery BiPAP S/T   CPAP/BIPAP charged w/in last 24 h YES   $ Initial CPAP/BiPAP Setup? No   $ Is patient using? Yes   Size of Mask Other (see comments)   Sized Appropriately? Yes   Equipment Type V60   Airway Device Type medium full face mask   Ipap 10   EPAP (cm H2O) 5   Pressure Support (cm H2O) 5   Set Rate (Breaths/Min) 16   ITime (sec) 0.9   Rise Time (sec) 3   Patient CPAP/BiPAP Settings   Timed Inspiration (Sec) 0.9   IPAP Rise Time (sec) 3   RR Total (Breaths/Min) 21   Tidal Volume (mL) 462   VE Minute Ventilation (L/min) 9.5 L/min   Peak Inspiratory  Pressure (cm H2O) 11   TiTOT (%) 31   Total Leak (L/Min) 35   Patient Trigger - ST Mode Only (%) 94   Education   $ Education BiPAP;Bronchodilator

## 2025-03-20 NOTE — PROGRESS NOTES
Patient and family decided going home on hospice, comfort care  Antibiotics discontinued   Infectious Disease will sign off, call us back with any questions

## 2025-03-20 NOTE — ASSESSMENT & PLAN NOTE
"This patient does have evidence of infective focus  My overall impression is sepsis.  Source: Respiratory  Antibiotics given-   Antibiotics (72h ago, onward)      Start     Stop Route Frequency Ordered    03/19/25 1800  ceFEPIme injection 2 g         -- IV Every 12 hours (non-standard times) 03/19/25 1119    03/16/25 0945  metronidazole IVPB 500 mg         -- IV Every 8 hours (non-standard times) 03/16/25 0842          Latest lactate reviewed-  No results for input(s): "LACTATE", "POCLAC" in the last 72 hours.    Organ dysfunction indicated by Acute respiratory failure    Fluid challenge we will give fluids    Post- resuscitation assessment Yes - I attest a sepsis perfusion exam was performed within 6 hours of sepsis, severe sepsis, or septic shock presentation, following fluid resuscitation.      Will Not start Pressors- Levophed for MAP of 65  Source control achieved by:     MRSA negative.   Discontinue vancomycin.   CTA of the chest reviewed, no significant pneumonia.  UA negative.   CT abdomen and pelvis results reviewed.  Continue cefepime and   Metronidazole  "

## 2025-03-20 NOTE — ASSESSMENT & PLAN NOTE
Patient has a diagnosis of pneumonia. The cause of the pneumonia is suspected to be bacterial in etiology but organism is not known. The pneumonia is stable. The patient has the following signs/symptoms of pneumonia: persistent hypoxia  and cough. The patient does have a current oxygen requirement and the patient does have a home oxygen requirement. I have reviewed the pertinent imaging. The following cultures have been collected: Blood cultures and Sputum culture The culture results are listed below.     Current antimicrobial regimen consists of the antibiotics listed below. Will monitor patient closely and continue current treatment plan unchanged.   Follow Pulmonary Medicine recommendations.  Antibiotics (From admission, onward)      Start     Stop Route Frequency Ordered    03/19/25 1800  ceFEPIme injection 2 g         -- IV Every 12 hours (non-standard times) 03/19/25 1119    03/16/25 0945  metronidazole IVPB 500 mg         -- IV Every 8 hours (non-standard times) 03/16/25 0842            Microbiology Results (last 7 days)       Procedure Component Value Units Date/Time    Blood culture x two cultures. Draw prior to antibiotics [[id number]] Collected: 03/14/25 0653    Order Status: Completed Specimen: Blood Updated: 03/19/25 0832     Blood Culture, Routine No growth after 5 days.    Narrative:      Aerobic and anaerobic  Collection has been rescheduled by MYB at 03/14/2025 00:30 Reason:   Unable to collect 2nd set. Nurse Notified  Collection has been rescheduled by MYB at 03/14/2025 00:30 Reason:   Unable to collect 2nd set. Nurse Notified    Culture, Respiratory with Gram Stain [[id number]]  (Abnormal)  (Susceptibility) Collected: 03/17/25 0753    Order Status: Completed Specimen: Respiratory from Sputum, Expectorated Updated: 03/19/25 0628     Respiratory Culture PSEUDOMONAS AERUGINOSA  Moderate       Gram Stain (Respiratory) >10 epithelial cells per low power field     Gram Stain (Respiratory) Many WBC's      Gram Stain (Respiratory) Moderate Gram negative rods    Blood culture x two cultures. Draw prior to antibiotics [[id number]] Collected: 03/13/25 2344    Order Status: Completed Specimen: Blood from Peripheral, Hand, Right Updated: 03/19/25 0232     Blood Culture, Routine No growth after 5 days.    Narrative:      Aerobic and anaerobic    MRSA Screen by PCR [[id number]] Collected: 03/14/25 1343    Order Status: Completed Specimen: Nasal Swab Updated: 03/14/25 1651     MRSA SCREEN BY PCR Not Detected    Respiratory Infection Panel (PCR), Nasopharyngeal [[id number]] Collected: 03/14/25 0902    Order Status: Completed Specimen: Nasopharyngeal Swab Updated: 03/14/25 1103     Respiratory Infection Panel Source NP swab     Adenovirus Not Detected     Coronavirus 229E, Common Cold Virus Not Detected     Coronavirus HKU1, Common Cold Virus Not Detected     Coronavirus NL63, Common Cold Virus Not Detected     Coronavirus OC43, Common Cold Virus Not Detected     Comment: Coronavirus strains 229E, HKU1, NL63, and OC43 can cause the common   cold   and are not associated with the respiratory disease outbreak caused   by  the COVID-19 (SARS-CoV-2 novel Coronavirus) strain.           SARS-CoV2 (COVID-19) Qualitative PCR Not Detected     Human Metapneumovirus Not Detected     Human Rhinovirus/Enterovirus Not Detected     Influenza A Not Detected     Influenza B Not Detected     Parainfluenza Virus 1 Not Detected     Parainfluenza Virus 2 Not Detected     Parainfluenza Virus 3 Not Detected     Parainfluenza Virus 4 Not Detected     Respiratory Syncytial Virus Not Detected     Bordetella Parapertussis (PK2064) Not Detected     Bordetella pertussis (ptxP) Not Detected     Chlamydia pneumoniae Not Detected     Mycoplasma pneumoniae Not Detected     Comment: Respiratory Infection Panel testing performed by Multiplex PCR.       Narrative:      Respiratory Infection Panel source->NP Swab            On intravenous cefepime day 6.

## 2025-03-20 NOTE — DISCHARGE SUMMARY
[facility]  Discharge Summary      Patient Name: Reji Romano  MRN: [id number]  [facility]: [id number]  Patient Class: IP- Inpatient  Admission Date: 3/13/2025  Hospital Length of Stay: 6 days  Discharge Date and Time:  03/20/2025 11:25 AM  Attending Physician: Zoey Copeland MD   Discharging Provider: Zoey Copeland MD  Primary Care Provider: Zulema Gutierrez    Primary Care Team: Networked reference to record PCT     HPI:   This is a 70-year-old  male with a history of severe COPD, chronic hypoxic respiratory failure on 2 L nasal cannula 24/7, severe cachexia with a BMI 18.3,  throat cancer status post chemoradiation with chronic dysphagia on PEG tube feeding presenting here for worsened shortness of breaths for past few days.  Also reported may have altered mental status, however patient is awake alert when I evaluated him.  Patient has severe dysarthria secondary to previous throat cancer, history is obtained from wife at bedside.  Patient apparently has been feel unwell for few days, no sick contacts.  No chest pain.  No fever or chills.  Patient has PEG tube with every feeding/ medication through this.  Patient has is absolutely nothing by mouth.  Patient also has a chest vest on 3% normal saline nebulizer at home.   In the ER, patient is afebrile, hemodynamically stable.  WBC count is 40 K. patient did not received any steroids prior to this as per wife.     * No surgery found *      Hospital Course:   70-year-old male with a history of throat cancer status post chemoradiation, chronic dysphagia/ dysarthria PEG tube feeding, COPD on 2 L home ,chronic Pseudomonas colonization came in with worsening SOB and admitted for acute on chronic hypoxic respiratory failure likely multifactorial including COPD exacerbation wth possible pneumonia (elevated processioning).  CT abdomen showed possible colitis and patient was started on antibiotics.  CTA of the chest was negative for  PE.  Slowly wean down steroids as needed for COPD exacerbation.   CT scan of the abdomen was consistent with pancolitis.  Patient maintained on antibiotic therapy with symptoms  Initiated.  Patient is anxious to be discharged. Patient to keep head end of the bed elevated at 30° all the time.  Sputum cultures later grew Pseudomonas species.  Patient was closely followed by Pulmonary Medicine.  Patient's overall condition continued to decline and it became very obvious patient was suffering and was in distress and constantly asking for pain medication.  Patient's wife after discussion with multiple providers later opted to make patient code status DNR and requested home hospice.  Patient  Has been accepted by hospice compresses.   All questions regarding home hospice were addressed in presence of bedside nurse and  with patient's wife.      Goals of Care Treatment Preferences:  Code Status: DNR      SDOH Screening:  The patient declined to be screened for utility difficulties, food insecurity, transport difficulties, housing insecurity, and interpersonal safety, so no concerns could be identified this admission.     Consults:   Consults (From admission, onward)          Status Ordering Provider     Inpatient consult to Infectious Diseases  Once        Provider:  Kathryn Toledo MD    Completed SULTAN, AQIB     Inpatient consult to Pulmonology  Once        Provider:  (Not yet assigned)    Completed SULTAN, AQIB     Inpatient consult to Palliative Care  Once        Provider:  Pascual Morrison MD    Completed SULTAN, AQIB     Inpatient consult to   Once        Provider:  (Not yet assigned)    Acknowledged SULTAN, AQIB     Inpatient consult to Midline team  Once        Provider:  (Not yet assigned)    Completed STONEY DE LA CRUZ     Inpatient consult to Registered Dietitian/Nutritionist  Once        Provider:  (Not yet assigned)    Completed DWIGHT PRADO            Assessment &  "Plan  Laryngeal cancer s/p surgery and chemo  Aware, patient has chronic PEG tube feeding.  Nothing by mouth strictly.   PEG feedings as per nutritionist recommendations.  Patient has chronic dysphagia/ dysarthria.   COPD with acute exacerbation  Patient's COPD is with exacerbation noted by continued dyspnea and use of accessory muscles for breathing currently.  Patient is currently on COPD Pathway. Continue scheduled inhalers Steroids, Antibiotics, and Supplemental oxygen and monitor respiratory status closely.   Sputum culture grew Pseudomonas species.  Patient is on intravenous cefepime day 6.    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   On IV solumedrol 40 q 8 hrs.  PEG tube   Aware, consult dietitian  Bronchiectasis  Continue home vest therapy.   Sepsis  This patient does have evidence of infective focus  My overall impression is sepsis.  Source: Respiratory  Antibiotics given-   Antibiotics (72h ago, onward)      Start     Stop Route Frequency Ordered    03/19/25 1800  ceFEPIme injection 2 g         -- IV Every 12 hours (non-standard times) 03/19/25 1119    03/16/25 0945  metronidazole IVPB 500 mg         -- IV Every 8 hours (non-standard times) 03/16/25 0842          Latest lactate reviewed-  No results for input(s): "LACTATE", "POCLAC" in the last 72 hours.    Organ dysfunction indicated by Acute respiratory failure    Fluid challenge we will give fluids    Post- resuscitation assessment Yes - I attest a sepsis perfusion exam was performed within 6 hours of sepsis, severe sepsis, or septic shock presentation, following fluid resuscitation.      Will Not start Pressors- Levophed for MAP of 65  Source control achieved by:     MRSA negative.   Discontinue vancomycin.   CTA of the chest reviewed, no significant pneumonia.  UA negative.   CT abdomen and pelvis results reviewed.  Continue cefepime and   Metronidazole  Possible Community acquired pneumonia  Patient has a diagnosis of " pneumonia. The cause of the pneumonia is suspected to be bacterial in etiology but organism is not known. The pneumonia is stable. The patient has the following signs/symptoms of pneumonia: persistent hypoxia  and cough. The patient does have a current oxygen requirement and the patient does have a home oxygen requirement. I have reviewed the pertinent imaging. The following cultures have been collected: Blood cultures and Sputum culture The culture results are listed below.     Current antimicrobial regimen consists of the antibiotics listed below. Will monitor patient closely and continue current treatment plan unchanged.   Follow Pulmonary Medicine recommendations.  Antibiotics (From admission, onward)      Start     Stop Route Frequency Ordered    03/19/25 1800  ceFEPIme injection 2 g         -- IV Every 12 hours (non-standard times) 03/19/25 1119    03/16/25 0945  metronidazole IVPB 500 mg         -- IV Every 8 hours (non-standard times) 03/16/25 0842            Microbiology Results (last 7 days)       Procedure Component Value Units Date/Time    Blood culture x two cultures. Draw prior to antibiotics [[id number]] Collected: 03/14/25 0653    Order Status: Completed Specimen: Blood Updated: 03/19/25 0832     Blood Culture, Routine No growth after 5 days.    Narrative:      Aerobic and anaerobic  Collection has been rescheduled by MYB at 03/14/2025 00:30 Reason:   Unable to collect 2nd set. Nurse Notified  Collection has been rescheduled by MYB at 03/14/2025 00:30 Reason:   Unable to collect 2nd set. Nurse Notified    Culture, Respiratory with Gram Stain [[id number]]  (Abnormal)  (Susceptibility) Collected: 03/17/25 0753    Order Status: Completed Specimen: Respiratory from Sputum, Expectorated Updated: 03/19/25 0628     Respiratory Culture PSEUDOMONAS AERUGINOSA  Moderate       Gram Stain (Respiratory) >10 epithelial cells per low power field     Gram Stain (Respiratory) Many WBC's     Gram Stain (Respiratory)  Moderate Gram negative rods    Blood culture x two cultures. Draw prior to antibiotics [[id number]] Collected: 03/13/25 2344    Order Status: Completed Specimen: Blood from Peripheral, Hand, Right Updated: 03/19/25 0232     Blood Culture, Routine No growth after 5 days.    Narrative:      Aerobic and anaerobic    MRSA Screen by PCR [[id number]] Collected: 03/14/25 1343    Order Status: Completed Specimen: Nasal Swab Updated: 03/14/25 1651     MRSA SCREEN BY PCR Not Detected    Respiratory Infection Panel (PCR), Nasopharyngeal [[id number]] Collected: 03/14/25 0902    Order Status: Completed Specimen: Nasopharyngeal Swab Updated: 03/14/25 1103     Respiratory Infection Panel Source NP swab     Adenovirus Not Detected     Coronavirus 229E, Common Cold Virus Not Detected     Coronavirus HKU1, Common Cold Virus Not Detected     Coronavirus NL63, Common Cold Virus Not Detected     Coronavirus OC43, Common Cold Virus Not Detected     Comment: Coronavirus strains 229E, HKU1, NL63, and OC43 can cause the common   cold   and are not associated with the respiratory disease outbreak caused   by  the COVID-19 (SARS-CoV-2 novel Coronavirus) strain.           SARS-CoV2 (COVID-19) Qualitative PCR Not Detected     Human Metapneumovirus Not Detected     Human Rhinovirus/Enterovirus Not Detected     Influenza A Not Detected     Influenza B Not Detected     Parainfluenza Virus 1 Not Detected     Parainfluenza Virus 2 Not Detected     Parainfluenza Virus 3 Not Detected     Parainfluenza Virus 4 Not Detected     Respiratory Syncytial Virus Not Detected     Bordetella Parapertussis (AY8572) Not Detected     Bordetella pertussis (ptxP) Not Detected     Chlamydia pneumoniae Not Detected     Mycoplasma pneumoniae Not Detected     Comment: Respiratory Infection Panel testing performed by Multiplex PCR.       Narrative:      Respiratory Infection Panel source->NP Swab            On intravenous cefepime day 6.  Severe malnutrition  Nutrition  consulted. Most recent weight and BMI monitored-     Measurements:  Wt Readings from Last 1 Encounters:   03/19/25 48.7 kg (107 lb 5.8 oz)   Body mass index is 17.87 kg/m².    Patient has been screened and assessed by RD.    Malnutrition Type:  Context:    Level:      Malnutrition Characteristic Summary:       Interventions/Recommendations (treatment strategy):  1) Continue current TF regimen of Glucerna 1.5 6 cans daily as before with 90 cc free water flush after each feeding to provide 2133 kcals and 118 g protein. 2)  If kitchen out of Glucerna, suggest Diabetisource-7 cans daily to provide 2100 kcals, 105 gm protein and 1428 mL of free water. Suggest FWF 90mL after each feed. TF to meet 100% EEN and EPN. 3.) RD to perform NFPE at follow up.    Acute on chronic respiratory failure with hypoxia  Patient with Hypoxic Respiratory failure which is Acute on chronic.  he is on home oxygen at 2 LPM. Supplemental oxygen was provided and noted- Oxygen Concentration (%):  [30] 30  Signs/symptoms of respiratory failure include- increased work of breathing, respiratory distress, and use of accessory muscles. Contributing diagnoses includes - COPD and Pneumonia Labs and images were reviewed. Will treat underlying causes and adjust management of respiratory failure as follows  Antibiotics and steroids.   Follow Pulmonary Medicine recommendations.    D/W daughter, bedside nurse and .     Goals of care, counseling/discussion  Advance Care Planning   DNR       Palliative care by specialist      ACP (advance care planning)      Comfort measures only status        Final Active Diagnoses:    Diagnosis Date Noted POA    PRINCIPAL PROBLEM:  Acute on chronic respiratory failure with hypoxia [J96.21] 03/16/2025 Yes    Comfort measures only status [Z51.5] 03/20/2025 Not Applicable    Goals of care, counseling/discussion [Z71.89] 03/19/2025 Not Applicable    Palliative care by specialist [Z51.5] 03/19/2025 Not Applicable     ACP (advance care planning) [Z71.89] 03/19/2025 Not Applicable    Sepsis [A41.9] 03/14/2025 Yes    Possible Community acquired pneumonia [J18.9]  Pseudomonas pneumonia  03/14/2025 Yes    Severe malnutrition [E43] 03/14/2025 Yes    COPD with acute exacerbation [J44.1] 06/04/2023 Yes    Laryngeal cancer s/p surgery and chemo [C32.9] 06/04/2023 Yes    PEG tube  [K94.23] 06/04/2023 Yes    Bronchiectasis [J47.9] 06/04/2023 Yes      Problems Resolved During this Admission:       Discharged Condition:   Poor    Disposition: Hospice/Home    Follow Up:   Follow-up Information       Vincenzo Bosch FNP. Go on 3/24/2025.    Specialty: Family Medicine  Why: Hospital follow up scheduled at 11:00 AM., As needed  Contact information:  [address] [place] [id number]  [phone]                           Patient Instructions:                            Diet NPO     Notify your health care provider if you experience any of the following:   Order Comments: Please notify hospice RN regarding any change sin medical condition.     Tube Feedings/Formulas   Order Comments: Glucerna 1.5 6 cans daily as before with 90 cc free water flush after each feeding.     Order Specific Question Answer Comments   Select Adult Formula: Other    Route: Gastrostomy      Activity as tolerated   Order Comments: Up with assist, observe fall precautions, rotate patient every 2 hours, keep head end of the bed elevated 30° all the time.       Significant Diagnostic Studies: Labs: CMP   Recent Labs   Lab 03/19/25  0907 03/20/25  0451   * 149*   K 4.6 4.7    111*   CO2 28 28   * 124*   BUN 49* 59*   CREATININE 0.9 1.1   CALCIUM 8.5* 8.4*   PROT 5.1* 5.0*   ALBUMIN 2.5* 2.7*   BILITOT 0.5 0.5   ALKPHOS 91 108   AST 25 28   ALT 29 35   ANIONGAP 10 10   , CBC   Recent Labs   Lab 03/19/25  0907 03/19/25  2344 03/20/25  0451   WBC 50.28*  --  53.16*   HGB 15.0  --  14.9   HCT 50.1   < > 50.2     --  279    < > = values in  "this interval not displayed.   , INR   Lab Results   Component Value Date    INR 1.0 09/03/2024    INR 0.9 03/28/2023    INR 0.9 05/04/2021   , Lipid Panel   Lab Results   Component Value Date    CHOL 157 06/09/2022    HDL 35 (L) 06/09/2022    LDLCALC 70 06/09/2022    TRIG 258 (H) 06/09/2022    CHOLHDL 4.49 06/09/2022   , and Troponin No results for input(s): "TROPONINI" in the last 168 hours.  Microbiology: Blood Culture   Lab Results   Component Value Date    LABBLOO No growth after 5 days. 03/14/2025   , Sputum Culture   Lab Results   Component Value Date    GSRESP >10 epithelial cells per low power field 03/17/2025    GSRESP Many WBC's 03/17/2025    GSRESP Moderate Gram negative rods 03/17/2025    RESPIRATORYC PSEUDOMONAS AERUGINOSA  Moderate   (A) 03/17/2025   , and Urine Culture  No results found for: "LABURIN"    Pending Diagnostic Studies:       None           Medications:  Reconciled Home Medications:      Medication List        START taking these medications      levoFLOXacin 500 MG tablet  Commonly known as: LEVAQUIN  Take 1 tablet (500 mg total) by mouth once daily. for 5 days     predniSONE 5 mg/5 mL Soln  Take 30 cc daily for 4 days and then   Take 20 cc daily for 4 days and then   Take 10 cc daily for 4 days and stop.            CONTINUE taking these medications      * albuterol 2.5 mg /3 mL (0.083 %) nebulizer solution  Commonly known as: PROVENTIL  Take 2.5 mg by nebulization 2 (two) times a day.     * albuterol 90 mcg/actuation inhaler  Commonly known as: PROVENTIL/VENTOLIN HFA  Inhale 1-2 puffs into the lungs every 6 (six) hours as needed for Wheezing.     amitriptyline 50 MG tablet  Commonly known as: ELAVIL  Take 50 mg by mouth every evening.     CLARKZTRI AEROSPHERE 160-9-4.8 mcg/actuation Hfaa  Generic drug: budesonide-glycopyr-formoterol  Inhale 2 puffs into the lungs 2 (two) times daily.     famotidine 20 MG tablet  Commonly known as: PEPCID  Take 20 mg by mouth 2 (two) times daily.   "   FeroSuL 325 mg (65 mg iron) Tab tablet  Generic drug: ferrous sulfate  Take 325 mg by mouth every other day.     fluticasone propionate 50 mcg/actuation nasal spray  Commonly known as: FLONASE  1 spray by Each Nostril route once daily.     levothyroxine 125 MCG tablet  Commonly known as: SYNTHROID  Take 125 mcg by mouth every evening.     oxyCODONE 20 mg Tab immediate release tablet  Commonly known as: ROXICODONE  Take 1 tablet by mouth 3 (three) times daily as needed for Pain.     roflumilast 500 mcg Tab  Commonly known as: DALIRESP  Take 500 mcg by mouth once daily.     sodium chloride 3% 3 % nebulizer solution  Take 4 mLs by nebulization 2 (two) times a day.           * This list has 2 medication(s) that are the same as other medications prescribed for you. Read the directions carefully, and ask your doctor or other care provider to review them with you.                STOP taking these medications      azithromycin 500 MG tablet  Commonly known as: ZITHROMAX            ASK your doctor about these medications      hydrOXYzine HCL 25 MG tablet  Commonly known as: ATARAX  Take 12.5 mg by mouth 3 (three) times daily as needed.              Indwelling Lines/Drains at time of discharge:   Lines/Drains/Airways       Drain  Duration                  Gastrostomy/Enterostomy  LUQ feeding -- days                    Time spent on the discharge of patient: 35 minutes         Zoey Copeland MD  Department of Hospital Medicine  [facility]

## 2025-03-20 NOTE — PROGRESS NOTES
Latest Reference Range & Units 03/19/25 23:44   POC PH 7.35 - 7.45  7.353   POC PCO2 35 - 45 mmHg 52.0 (H)   POC PO2 80 - 100 mmHg 83   POC HCO3 24 - 28 mmol/L 28.9 (H)   POC Sodium 136 - 145 mmol/L 146 (H)   POC Potassium 3.5 - 5.1 mmol/L 4.3   POC SATURATED O2 95 - 100 % 95   Sample  ARTERIAL   POC TCO2 23 - 27 mmol/L 30 (H)   POC Ionized Calcium 1.06 - 1.42 mmol/L 1.18   POC Glucose 70 - 110 mg/dL 142 (H)   POC Hematocrit 36 - 54 %PCV 47   POC BE -2 to 2 mmol/L 3 (H)   FiO2  30   DelSys  CPAP/BiPAP   Site  RR   Mode  BiPAP   Rate  16

## 2025-03-20 NOTE — PROGRESS NOTES
Pulmonary/Critical Care  Progress Note      Patient name: Reji Romano  MRN: [id number]  Date: 03/20/2025    Admit Date: 3/13/2025  Consult Requested By: Zoey Copeland MD    Reason for Consult: REspiratory failure, COPD    HPI:    3/17/2025 - Pt has been admitted fr a few days and was tentatively going home today when he developed an acute decompensation.  I was called to see him.  CXR looked OK and ABG was OK but he had increased WOB.  When I arrived he still has increased WOB biut will settle down at times.  He goes from not responding to questions to answering (though voice quality is not good).  He has COPD (I suspect very severe, wife says at one point there was talk about transplant), h/o head and neck cancer, s/p surgery, XRT (she tells me no active disease), prior trach (she says related to respiratory failure with sepsis), severe malnutrition.  I discussed at length with them about code status and at this time he wants to be a full code - he does not want to live prolonged on a ventilator (I did discuss that if he ends up on a ventilator that extubation would be very difficult).  We also discussed trying BiPAP and he is willing to try.  He also has chronic pain issues and I feel taht we need to continue his medications.      3/18/2025 - He is doing better this AM and has been able to use the BiPAP some, no new issues but he remains very tenuous and could decompensate at any time.  WBC increased, SC with GNR    3/19- sputum cx with pseudomonas. Resp status has been stable, on nasal cannula 3L.   3/20- pt having increased pain in his abdomen, distention, unable to tolerate tube feeds. With multiple episodes of emesis, possibly side effect of morphine IV. He is having increased work of breathing, low bp and difficult to read sats. Family at bedside    Review of Systems    Review of Systems   Constitutional:  Positive for malaise/fatigue and weight loss. Negative for chills, diaphoresis and fever.   HENT:   Negative for congestion.    Eyes:  Negative for pain.   Respiratory:  Positive for cough, sputum production (trouble clearing airway) and shortness of breath. Negative for hemoptysis, wheezing and stridor.    Cardiovascular:  Negative for chest pain, palpitations, orthopnea, claudication, leg swelling and PND.   Gastrointestinal:  Positive for nausea. Negative for abdominal pain, constipation, diarrhea, heartburn and vomiting.        PEG   Genitourinary:  Negative for dysuria, frequency and urgency.   Musculoskeletal:  Negative for falls and myalgias.   Neurological:  Positive for weakness. Negative for sensory change and focal weakness.   Psychiatric/Behavioral:  Negative for depression. The patient is not nervous/anxious.        Past Medical History    History reviewed. No pertinent past medical history.    Past Surgical History    History reviewed. No pertinent surgical history.    Medications (scheduled):      albuterol-ipratropium  3 mL Nebulization Q4H WAKE    amitriptyline  50 mg Oral QHS    arformoteroL  15 mcg Nebulization BID    budesonide  1 mg Nebulization Q12H    ceFEPime IV (PEDS and ADULTS)  2 g Intravenous Q12H    chlorhexidine  15 mL Mouth/Throat BID    enoxparin  40 mg Subcutaneous Daily    levothyroxine  125 mcg Oral QHS    methylPREDNISolone injection (PEDS and ADULTS)  40 mg Intravenous Q8H    metroNIDAZOLE IV (PEDS and ADULTS)  500 mg Intravenous Q8H    roflumilast  500 mcg Oral Daily    sodium chloride 3%  4 mL Nebulization BID       Medications (infusions):      0.45% NaCl   Intravenous Continuous 50 mL/hr at 03/19/25 1508 New Bag at 03/19/25 1508       Medications (prn):       Current Facility-Administered Medications:     hydrOXYzine HCL, 25 mg, Oral, BID PRN    morphine, 2 mg, Intravenous, Q2H PRN    ondansetron, 4 mg, Intravenous, Q12H PRN    oxyCODONE, 20 mg, Oral, TID PRN    Flushing PICC/Midline Protocol, , , Until Discontinued **AND** sodium chloride 0.9%, 10 mL, Intravenous, Q12H  "PRN    sodium chloride 0.9%, 3 mL, Intravenous, PRN    Family History: No family history on file.    Social History: Tobacco: Tobacco Use History[1]                             EtOH:   Social History     Substance and Sexual Activity   Alcohol Use Not Currently                                Drugs:   Social History     Substance and Sexual Activity   Drug Use Not Currently     Physical Exam    Vital signs:  Temp:  [96.6 °F (35.9 °C)-98 °F (36.7 °C)]   Pulse:  []   Resp:  [15-59]   BP: ()/(53-92)   SpO2:  [78 %-100 %]     Intake/Output:   Intake/Output Summary (Last 24 hours) at 3/20/2025 0848  Last data filed at 3/20/2025 0537  Gross per 24 hour   Intake 0 ml   Output --   Net 0 ml        BMI: Estimated body mass index is 17.87 kg/m² as calculated from the following:    Height as of this encounter: 5' 5" (1.651 m).    Weight as of this encounter: 48.7 kg (107 lb 5.8 oz).    Physical Exam  Vitals and nursing note reviewed.   Constitutional:       General: He is in acute distress.      Appearance: Normal appearance. He is ill-appearing. He is not toxic-appearing or diaphoretic.      Comments: Thin, frail  Wearing BiPAP   HENT:      Head: Normocephalic and atraumatic.      Comments: Prior surgery (wife says he has titanium in his jaw)     Right Ear: External ear normal.      Left Ear: External ear normal.      Nose: Nose normal.      Mouth/Throat:      Mouth: Mucous membranes are moist.      Pharynx: Oropharyngeal exudate and posterior oropharyngeal erythema present.      Comments: Throat and mouth with thick green mucous  Eyes:      General: No scleral icterus.        Right eye: No discharge.         Left eye: No discharge.      Extraocular Movements: Extraocular movements intact.      Conjunctiva/sclera: Conjunctivae normal.      Pupils: Pupils are equal, round, and reactive to light.   Neck:      Vascular: No carotid bruit.      Comments: Prior trach site  Very firm neck tissue from treatment  Decreased " "mobility  Cardiovascular:      Rate and Rhythm: Regular rhythm. Tachycardia present.      Pulses: Normal pulses.      Heart sounds: Normal heart sounds. No murmur heard.     No friction rub. No gallop.   Pulmonary:      Effort: Respiratory distress present.      Breath sounds: No stridor. Rhonchi and rales present. No wheezing.      Comments: Decreased BS throughout  Intermittent acc m use  Chest:      Chest wall: No tenderness.   Abdominal:      General: Bowel sounds are normal. There is distension.      Palpations: Abdomen is soft.      Tenderness: There is abdominal tenderness. There is no guarding.      Comments: PEG  tympanic   Musculoskeletal:         General: No swelling. Normal range of motion.      Cervical back: Normal range of motion. Rigidity present. No tenderness.      Right lower leg: No edema.      Left lower leg: No edema.      Comments: + surgical scars  + muscle wasting   Lymphadenopathy:      Cervical: No cervical adenopathy.   Skin:     General: Skin is warm and dry.      Capillary Refill: Capillary refill takes less than 2 seconds.      Coloration: Skin is not jaundiced.      Findings: No bruising.   Neurological:      General: No focal deficit present.      Mental Status: He is alert. Mental status is at baseline.      Cranial Nerves: No cranial nerve deficit.      Sensory: No sensory deficit.      Motor: Weakness present.      Comments: Waxing and waning of LOC  Difficulty understanding when he vocalizes   Psychiatric:      Comments: Agitated at times  Respiratory distress         Laboratory    Recent Labs   Lab 03/20/25  0451   WBC 53.16*   RBC 5.24   HGB 14.9   HCT 50.2      MCV 96   MCH 28.4   MCHC 29.7*         Recent Labs   Lab 03/20/25  0451   CALCIUM 8.4*   ALBUMIN 2.7*   PROT 5.0*   *   K 4.7   CO2 28   *   BUN 59*   CREATININE 1.1   ALKPHOS 108   ALT 35   AST 28   BILITOT 0.5       No results for input(s): "PT", "INR", "APTT" in the last 24 hours.    No results for " "input(s): "CPK", "CPKMB", "TROPONINI", "MB" in the last 24 hours.    Additional labs: reviewed    Microbiology:       Microbiology Results (last 7 days)       Procedure Component Value Units Date/Time    Blood culture x two cultures. Draw prior to antibiotics [[id number]] Collected: 03/14/25 0653    Order Status: Completed Specimen: Blood Updated: 03/19/25 0832     Blood Culture, Routine No growth after 5 days.    Narrative:      Aerobic and anaerobic  Collection has been rescheduled by MYB at 03/14/2025 00:30 Reason:   Unable to collect 2nd set. Nurse Notified  Collection has been rescheduled by MYB at 03/14/2025 00:30 Reason:   Unable to collect 2nd set. Nurse Notified    Culture, Respiratory with Gram Stain [[id number]]  (Abnormal)  (Susceptibility) Collected: 03/17/25 0753    Order Status: Completed Specimen: Respiratory from Sputum, Expectorated Updated: 03/19/25 0628     Respiratory Culture PSEUDOMONAS AERUGINOSA  Moderate       Gram Stain (Respiratory) >10 epithelial cells per low power field     Gram Stain (Respiratory) Many WBC's     Gram Stain (Respiratory) Moderate Gram negative rods    Blood culture x two cultures. Draw prior to antibiotics [[id number]] Collected: 03/13/25 2344    Order Status: Completed Specimen: Blood from Peripheral, Hand, Right Updated: 03/19/25 0232     Blood Culture, Routine No growth after 5 days.    Narrative:      Aerobic and anaerobic    MRSA Screen by PCR [[id number]] Collected: 03/14/25 1343    Order Status: Completed Specimen: Nasal Swab Updated: 03/14/25 1651     MRSA SCREEN BY PCR Not Detected    Respiratory Infection Panel (PCR), Nasopharyngeal [[id number]] Collected: 03/14/25 0902    Order Status: Completed Specimen: Nasopharyngeal Swab Updated: 03/14/25 1103     Respiratory Infection Panel Source NP swab     Adenovirus Not Detected     Coronavirus 229E, Common Cold Virus Not Detected     Coronavirus HKU1, Common Cold Virus Not Detected     Coronavirus NL63, Common Cold " Virus Not Detected     Coronavirus OC43, Common Cold Virus Not Detected     Comment: Coronavirus strains 229E, HKU1, NL63, and OC43 can cause the common   cold   and are not associated with the respiratory disease outbreak caused   by  the COVID-19 (SARS-CoV-2 novel Coronavirus) strain.           SARS-CoV2 (COVID-19) Qualitative PCR Not Detected     Human Metapneumovirus Not Detected     Human Rhinovirus/Enterovirus Not Detected     Influenza A Not Detected     Influenza B Not Detected     Parainfluenza Virus 1 Not Detected     Parainfluenza Virus 2 Not Detected     Parainfluenza Virus 3 Not Detected     Parainfluenza Virus 4 Not Detected     Respiratory Syncytial Virus Not Detected     Bordetella Parapertussis (GT3056) Not Detected     Bordetella pertussis (ptxP) Not Detected     Chlamydia pneumoniae Not Detected     Mycoplasma pneumoniae Not Detected     Comment: Respiratory Infection Panel testing performed by [facility].       Narrative:      Respiratory Infection Panel source->NP Swab            Radiology    X-Ray Chest 1 View  Narrative: EXAMINATION:  XR CHEST 1 VIEW    CLINICAL HISTORY:  sob;    FINDINGS:  Portable chest with comparison chest x-ray 03/14/2025.  Normal cardiomediastinal silhouette.Lungs are clear. Pulmonary vasculature is normal. No acute osseous abnormality.  Left brachial venous line noted.  Impression: No acute cardiopulmonary abnormality.    Electronically signed by: Oscar Mccormick  Date:    03/17/2025  Time:    15:12    CXR with hyperinflation and flattened diaphragms    Additional Studies    reviewed    Ventilator Information    Oxygen Concentration (%):  [30] 30             Recent Labs     03/19/25  2344   PH 7.353   PCO2 52.0*   PO2 83   HCO3 28.9*   POCSATURATED 95   BE 3*         Impression    Active Hospital Problems    Diagnosis  POA    *Acute on chronic respiratory failure with hypoxia [J96.21]  Yes    Goals of care, counseling/discussion [Z71.89]  Not Applicable    Palliative  care by specialist [Z51.5]  Not Applicable    ACP (advance care planning) [Z71.89]  Not Applicable    Sepsis [A41.9]  Yes    Possible Community acquired pneumonia [J18.9]  Yes    Severe malnutrition [E43]  Yes    COPD with acute exacerbation [J44.1]  Yes    Laryngeal cancer s/p surgery and chemo [C32.9]  Yes    PEG tube  [K94.23]  Yes    Bronchiectasis [J47.9]  Yes      Resolved Hospital Problems   No resolved problems to display.       Plan      Discussed with spouse today. Pt is dying with worsening respiratory distress, bronchial infection and failing to improve despite several days of antibiotics and respiratory treatments. If transferred to ICU or intubated I still expect his prognosis still to be extremely poor. His suffering is apparent today and spouse Linh agrees to change focus to comfort measures with DNR code status. He wishes to go home and will ask for home hospice to allow for this.   Continue oxycodone prn pain  Adding ativan prn agitation/air hunger  Lovenox for prophylaxis  D/w Dr. Copeland, Tamiko and Santillan  Hospice company coming today  Requesting  to bedside        Claudette Thompson MD  Pulmonary & Critical Care Medicine           [1]   Social History  Tobacco Use   Smoking Status Former    Types: Cigarettes   Smokeless Tobacco Not on file

## 2025-03-20 NOTE — NURSING
Pt drowsy throughout shift. 's on monitor. RR shallow in 30's with abdominal breathing. Unable to obtain SpO2. Fingertips toes cyanotic. MAP 60s-70s. Pt placed on BIPAP. PRN given with no improvement. Abdomen rounded. Course lung sounds in all fields. Called RT to obtain PRN ABG. See results. MD notified by RN. See orders

## 2025-03-20 NOTE — PT/OT/SLP PROGRESS
Occupational Therapy      Patient Name:  Reji Romano   MRN:  [id number]    Patient not seen today secondary to  (prepping dc home with hospice).    3/20/2025